# Patient Record
Sex: MALE | Race: WHITE | NOT HISPANIC OR LATINO | Employment: OTHER | URBAN - METROPOLITAN AREA
[De-identification: names, ages, dates, MRNs, and addresses within clinical notes are randomized per-mention and may not be internally consistent; named-entity substitution may affect disease eponyms.]

---

## 2017-01-28 ENCOUNTER — TRANSFERRED RECORDS (OUTPATIENT)
Dept: HEALTH INFORMATION MANAGEMENT | Facility: CLINIC | Age: 77
End: 2017-01-28

## 2017-01-28 LAB — EJECTION FRACTION: 63

## 2017-03-07 ENCOUNTER — TRANSFERRED RECORDS (OUTPATIENT)
Dept: HEALTH INFORMATION MANAGEMENT | Facility: CLINIC | Age: 77
End: 2017-03-07

## 2017-03-16 ENCOUNTER — TRANSFERRED RECORDS (OUTPATIENT)
Dept: HEALTH INFORMATION MANAGEMENT | Facility: CLINIC | Age: 77
End: 2017-03-16

## 2017-04-06 ENCOUNTER — TELEPHONE (OUTPATIENT)
Dept: INTERNAL MEDICINE | Facility: CLINIC | Age: 77
End: 2017-04-06

## 2017-04-06 NOTE — TELEPHONE ENCOUNTER
Reason for Call:  Same Day Appointment, Requested Provider:  Joey Henry MD    PCP: Renan Henry    Reason for visit: Just returned from [place] and the doctor there said to follow up ASAP with primary.  Had appointment on 4/12 but cannot make that appointment any more.     Duration of symptoms: NA    Can we leave a detailed message on this number? YES    Phone number patient can be reached at: Home number on file [phone] (home)    Best Time: Any time    Call taken on 4/6/2017 at 3:55 PM by Abena Toscano

## 2017-04-13 ENCOUNTER — OFFICE VISIT (OUTPATIENT)
Dept: INTERNAL MEDICINE | Facility: CLINIC | Age: 77
End: 2017-04-13
Payer: COMMERCIAL

## 2017-04-13 VITALS
TEMPERATURE: 97.8 F | HEART RATE: 68 BPM | OXYGEN SATURATION: 95 % | RESPIRATION RATE: 18 BRPM | WEIGHT: 205 LBS | SYSTOLIC BLOOD PRESSURE: 118 MMHG | BODY MASS INDEX: 26.31 KG/M2 | DIASTOLIC BLOOD PRESSURE: 64 MMHG | HEIGHT: 74 IN

## 2017-04-13 DIAGNOSIS — R91.8 GROUND GLASS OPACITY PRESENT ON IMAGING OF LUNG: ICD-10-CM

## 2017-04-13 DIAGNOSIS — K76.89 LIVER NODULE: ICD-10-CM

## 2017-04-13 DIAGNOSIS — I26.99 OTHER PULMONARY EMBOLISM WITHOUT ACUTE COR PULMONALE, UNSPECIFIED CHRONICITY (H): Primary | ICD-10-CM

## 2017-04-13 PROCEDURE — 99214 OFFICE O/P EST MOD 30 MIN: CPT | Performed by: INTERNAL MEDICINE

## 2017-04-13 NOTE — MR AVS SNAPSHOT
After Visit Summary   4/13/2017    Oliver Baires    MRN: [id number]           Patient Information     Date Of Birth          1940        Visit Information        Provider Department      4/13/2017 9:45 AM Renan Henry MD [facility]        Today's Diagnoses     Other pulmonary embolism without acute cor pulmonale, unspecified chronicity (H)    -  1    Ground glass opacity present on imaging of lung        Liver nodule           Follow-ups after your visit        Additional Services     PULMONARY MEDICINE REFERRAL       Your provider has referred you to: [facility]: Lung Nodule [facility] [phone]   [email or web address]    Please be aware that coverage of these services is subject to the terms and limitations of your health insurance plan.  Call member services at your health plan with any benefit or coverage questions.      Please bring the following with you to your appointment:    (1) Any X-Rays, CTs or MRIs which have been performed.  Contact the facility where they were done to arrange for  prior to your scheduled appointment.    (2) List of current medications   (3) This referral request   (4) Any documents/labs given to you for this referral                  Future tests that were ordered for you today     Open Future Orders        Priority Expected Expires Ordered    US Abdomen Complete Routine 7/12/2017 4/13/2018 4/13/2017            Who to contact     If you have questions or need follow up information about today's clinic visit or your schedule please contact [facility] directly at [phone].  Normal or non-critical lab and imaging results will be communicated to you by MyChart, letter or phone within 4 business days after the clinic has received the results. If you do not hear from us within 7 days, please contact the clinic through MyChart or phone. If you have a critical or  "abnormal lab result, we will notify you by phone as soon as possible.  Submit refill requests through rumr: turn off the lights or call your pharmacy and they will forward the refill request to us. Please allow 3 business days for your refill to be completed.          Additional Information About Your Visit        MyChart Information     rumr: turn off the lights lets you send messages to your doctor, view your test results, renew your prescriptions, schedule appointments and more. To sign up, go to [email or web address]: turn off the lights . Click on \"Log in\" on the left side of the screen, which will take you to the Welcome page. Then click on \"Sign up Now\" on the right side of the page.     You will be asked to enter the access code listed below, as well as some personal information. Please follow the directions to create your username and password.     Your access code is: [email or web address]  Expires: 2017 10:30 AM     Your access code will  in 90 days. If you need help or a new code, please call your [place] clinic or [phone].        Care EveryWhere ID     This is your Care EveryWhere ID. This could be used by other organizations to access your [place] medical records  [id number]        Your Vitals Were     Pulse Temperature Respirations Height Pulse Oximetry BMI (Body Mass Index)    68 97.8  F (36.6  C) (Oral) 18 6' 2\" (1.88 m) 95% 26.32 kg/m2       Blood Pressure from Last 3 Encounters:   17 118/64   16 100/70   16 120/64    Weight from Last 3 Encounters:   17 205 lb (93 kg)   16 195 lb 4.8 oz (88.6 kg)   16 195 lb (88.5 kg)              We Performed the Following     PULMONARY MEDICINE REFERRAL          Where to get your medicines      These medications were sent to [facility]/pharmacy #[id number] - [place], [place]  [address] [address]     Phone:  [phone]     rivaroxaban ANTICOAGULANT 20 MG Tabs tablet          Primary Care Provider Office Phone # Fax #    Renan Henry, " [phone]       [facility] [address] [facility] [address]        Thank you!     Thank you for choosing [facility]  for your care. Our goal is always to provide you with excellent care. Hearing back from our patients is one way we can continue to improve our services. Please take a few minutes to complete the written survey that you may receive in the mail after your visit with us. Thank you!             Your Updated Medication List - Protect others around you: Learn how to safely use, store and throw away your medicines at www.disposemymeds.org.          This list is accurate as of: 4/13/17 10:31 AM.  Always use your most recent med list.                   Brand Name Dispense Instructions for use    aspirin 81 MG tablet     90 tablet    Take 81 mg by mouth daily Reported on 4/13/2017       atorvastatin 80 MG tablet    LIPITOR    90 tablet    Take 1 tablet (80 mg) by mouth daily       CENTRUM SILVER per tablet      1 TABLET DAILY       cyclobenzaprine 10 MG tablet    FLEXERIL    30 tablet    Take 0.5-1 tablets (5-10 mg) by mouth 3 times daily as needed for muscle spasms       fish oil-omega-3 fatty acids 1000 MG capsule      Reported on 4/13/2017       lisinopril 5 MG tablet    PRINIVIL/ZESTRIL    90 tablet    Take 1 tablet (5 mg) by mouth daily       nitroglycerin 0.4 MG sublingual tablet    NITROSTAT    60 tablet    Place 1 tablet (0.4 mg) under the tongue every 5 minutes as needed up to 3 tablets per episode.       pantoprazole 40 MG EC tablet    PROTONIX    90 tablet    Take 1 tablet (40 mg) by mouth daily       rivaroxaban ANTICOAGULANT 20 MG Tabs tablet    XARELTO    90 tablet    Take 1 tablet (20 mg) by mouth daily (with dinner)       sildenafil 100 MG cap/tab    VIAGRA    15 tablet    Take 1 tablet by mouth. 1/2 tablet as needed       VITAMIN D PO      Take 1 tablet by mouth.       VITAMIN E PO      Take 1 tablet by mouth Reported on 4/13/2017

## 2017-04-13 NOTE — PROGRESS NOTES
SUBJECTIVE:                                                    Oliver Baires is a 77 year old male who presents to clinic today for the following health issues:          Hospital Follow-up Visit:    Hospital/Nursing Home/IP Rehab Facility: [place]   Date of Admission: 1/28/17  Date of Discharge: 1/29/2017  Reason(s) for Admission: pulmonary thrombosis            Problems taking medications regularly:  None       Medication changes since discharge: Began Xarelto       Problems adhering to non-medication therapy:  None    Summary of hospitalization:  See outside records, reviewed and scanned  Diagnostic Tests/Treatments reviewed.  Follow up needed: none  Other Healthcare Providers Involved in Patient s Care:         None  Update since discharge: improved.     Post Discharge Medication Reconciliation: discharge medications reconciled and changed, per note/orders (see AVS).  Plan of care communicated with patient     Coding guidelines for this visit:  Type of Medical   Decision Making Face-to-Face Visit       within 7 Days of discharge Face-to-Face Visit        within 14 days of discharge   Moderate Complexity [id number]   High Complexity [id number]                Problem list and histories reviewed & adjusted, as indicated.  Additional history: as documented    Patient here for follow-up hospitalization in [place] in late January.  Patient was admitted to [facility] in [place], [place] on January 28 of this year, after presenting with migratory back pain and shortness of breath.  He was eventually diagnosed with bilateral pulmonary emboli and right lower extremity DVT, all thought to be due to recent relative immobilization during several-day car trip from [place] to [place].  TTE at time of diagnosis revealed normal left ventricular ejection fraction, but did show RV dilatation.  He was started on Xarelto for anticoagulation, which he continues on at present.      His initial chest CT (1/28/17) had incidentally  "shown an ill-defined groundglass opacity within the left upper lobe peripherally.  Pulmonary infarct was considered, short-term follow-up CT had been recommended at that time.  He did obtain a follow-up CT on 3/16/17 while still in [place], which showed improvement of his pulmonary emboli, but did still show the groundglass opacification in the left upper lobe, 2.89 cm in largest dimension.  Neoplastic process could not completely be ruled out.  Follow-up CT in 3 months versus PET/CT was recommended.    Initial chest CT (1/28/17) also incidentally revealed ill-defined nodular density in the right hepatic lobe.  Abdominal ultrasound had been recommended.  He has had previous hepatic cysts in this region.    Patient is feeling reasonably well, but his exertional capacity is reduced from what he is used to.  As much as anything, he attributes this to relative inactivity during his wintering in [place].  For this reason, and because of his wife's declining health, they do not plan to spend additional wallace there.    Reviewed and updated as needed this visit by clinical staff  Tobacco  Allergies       Reviewed and updated as needed this visit by Provider         ROS:  Constitutional, HEENT, cardiovascular, pulmonary, gi and gu systems are negative, except as otherwise noted.    OBJECTIVE:                                                    /64 (BP Location: Left arm, Patient Position: Chair, Cuff Size: Adult Regular)  Pulse 68  Temp 97.8  F (36.6  C) (Oral)  Resp 18  Ht 6' 2\" (1.88 m)  Wt 205 lb (93 kg)  SpO2 95%  BMI 26.32 kg/m2  Body mass index is 26.32 kg/(m^2).  GENERAL: healthy, alert and no distress  NECK: no adenopathy, no asymmetry, masses, or scars and thyroid normal to palpation  RESP: lungs clear to auscultation - no rales, rhonchi or wheezes  CV: regular rate and rhythm, normal S1 S2, no S3 or S4, no murmur, click or rub, no peripheral edema and peripheral pulses strong  ABDOMEN: soft, nontender, no " hepatosplenomegaly, no masses and bowel sounds normal  MS: no gross musculoskeletal defects noted, no edema         ASSESSMENT/PLAN:                                                      1. Other pulmonary embolism without acute cor pulmonale, unspecified chronicity (H)  Tentative plan will be to continue full anticoagulation for at least 6 months.  DVT appears to be the result of immobility during long car trip preceding his presentation.  As such there is no compelling reason for hypercoagulable workup at this point.  - PULMONARY MEDICINE REFERRAL  - rivaroxaban ANTICOAGULANT (XARELTO) 20 MG TABS tablet; Take 1 tablet (20 mg) by mouth daily (with dinner)  Dispense: 90 tablet; Refill: 1    2. Ground glass opacity present on imaging of lung  Will refer to [facility] for diagnostic assistance, in context of current need for full anticoagulation.  - PULMONARY MEDICINE REFERRAL    3. Liver nodule    - US Abdomen Complete; Future        Renan Henry MD  [facility]

## 2017-04-13 NOTE — NURSING NOTE
"Chief Complaint   Patient presents with     Hospital F/U       Initial /64 (BP Location: Left arm, Patient Position: Chair, Cuff Size: Adult Regular)  Pulse 68  Temp 97.8  F (36.6  C) (Oral)  Resp 18  Ht 6' 2\" (1.88 m)  Wt 205 lb (93 kg)  SpO2 95%  BMI 26.32 kg/m2 Estimated body mass index is 26.32 kg/(m^2) as calculated from the following:    Height as of this encounter: 6' 2\" (1.88 m).    Weight as of this encounter: 205 lb (93 kg).  Medication Reconciliation: complete   Deepika Ferguson MA      "

## 2017-04-19 ENCOUNTER — RADIANT APPOINTMENT (OUTPATIENT)
Dept: ULTRASOUND IMAGING | Facility: CLINIC | Age: 77
End: 2017-04-19
Attending: INTERNAL MEDICINE
Payer: COMMERCIAL

## 2017-04-19 DIAGNOSIS — K76.89 LIVER NODULE: ICD-10-CM

## 2017-04-19 PROCEDURE — 76705 ECHO EXAM OF ABDOMEN: CPT

## 2017-07-08 ENCOUNTER — HEALTH MAINTENANCE LETTER (OUTPATIENT)
Age: 77
End: 2017-07-08

## 2017-07-11 DIAGNOSIS — R91.8 LUNG NODULES: Primary | ICD-10-CM

## 2017-07-19 ENCOUNTER — OFFICE VISIT (OUTPATIENT)
Dept: PULMONOLOGY | Facility: CLINIC | Age: 77
End: 2017-07-19
Attending: INTERNAL MEDICINE
Payer: COMMERCIAL

## 2017-07-19 VITALS
RESPIRATION RATE: 16 BRPM | DIASTOLIC BLOOD PRESSURE: 80 MMHG | TEMPERATURE: 98 F | WEIGHT: 205.91 LBS | BODY MASS INDEX: 26.44 KG/M2 | OXYGEN SATURATION: 97 % | HEART RATE: 61 BPM | SYSTOLIC BLOOD PRESSURE: 148 MMHG

## 2017-07-19 DIAGNOSIS — R91.8 GROUND GLASS OPACITY PRESENT ON IMAGING OF LUNG: ICD-10-CM

## 2017-07-19 DIAGNOSIS — I26.99 OTHER PULMONARY EMBOLISM WITHOUT ACUTE COR PULMONALE, UNSPECIFIED CHRONICITY (H): ICD-10-CM

## 2017-07-19 PROCEDURE — 99212 OFFICE O/P EST SF 10 MIN: CPT | Mod: ZF

## 2017-07-19 ASSESSMENT — ENCOUNTER SYMPTOMS
FEVER: 0
POLYPHAGIA: 0
POLYDIPSIA: 0
FATIGUE: 1
CHILLS: 0
INCREASED ENERGY: 0
ALTERED TEMPERATURE REGULATION: 0
NIGHT SWEATS: 0
HALLUCINATIONS: 0
DECREASED APPETITE: 0
WEIGHT GAIN: 0

## 2017-07-19 ASSESSMENT — PAIN SCALES - GENERAL: PAINLEVEL: MILD PAIN (3)

## 2017-07-19 NOTE — MR AVS SNAPSHOT
"              After Visit Summary   2017    Oliver Baires    MRN: [id number]           Patient Information     Date Of Birth          1940        Visit Information        Provider Department      2017 10:00 AM Wesley Olivares MD [facility]        Today's Diagnoses     Other pulmonary embolism without acute cor pulmonale, unspecified chronicity (H)        Ground glass opacity present on imaging of lung           Follow-ups after your visit        Who to contact     If you have questions or need follow up information about today's clinic visit or your schedule please contact [facility] directly at [phone].  Normal or non-critical lab and imaging results will be communicated to you by MyChart, letter or phone within 4 business days after the clinic has received the results. If you do not hear from us within 7 days, please contact the clinic through MyChart or phone. If you have a critical or abnormal lab result, we will notify you by phone as soon as possible.  Submit refill requests through AngioScore or call your pharmacy and they will forward the refill request to us. Please allow 3 business days for your refill to be completed.          Additional Information About Your Visit        MyChart Information     AngioScore lets you send messages to your doctor, view your test results, renew your prescriptions, schedule appointments and more. To sign up, go to [email or web address] . Click on \"Log in\" on the left side of the screen, which will take you to the Welcome page. Then click on \"Sign up Now\" on the right side of the page.     You will be asked to enter the access code listed below, as well as some personal information. Please follow the directions to create your username and password.     Your access code is: [email or web address]  Expires: 10/22/2017  7:39 AM     Your access code will  in 90 days. If you need help or a new code, please call your [place] " clinic or [phone].        Care EveryWhere ID     This is your Care EveryWhere ID. This could be used by other organizations to access your [place] medical records  [id number]        Your Vitals Were     Pulse Temperature Respirations Pulse Oximetry BMI (Body Mass Index)       61 98  F (36.7  C) (Oral) 16 97% 26.44 kg/m2        Blood Pressure from Last 3 Encounters:   07/19/17 148/80   04/13/17 118/64   08/27/16 100/70    Weight from Last 3 Encounters:   07/19/17 93.4 kg (205 lb 14.6 oz)   04/13/17 93 kg (205 lb)   08/27/16 88.6 kg (195 lb 4.8 oz)              Today, you had the following     No orders found for display         Today's Medication Changes          These changes are accurate as of: 7/19/17 11:59 PM.  If you have any questions, ask your nurse or doctor.               Stop taking these medicines if you haven't already. Please contact your care team if you have questions.     aspirin 81 MG tablet   Stopped by:  Wesley Olivares MD           fish oil-omega-3 fatty acids 1000 MG capsule   Stopped by:  Wesley Olivares MD           sildenafil 100 MG cap/tab   Commonly known as:  VIAGRA   Stopped by:  Wesley Olivares MD           VITAMIN E PO   Stopped by:  Wesley Olivares MD                    Primary Care Provider Office Phone # Fax #    Renan Joey Henry [phone]       [facility] [address] [address]        Equal Access to Services     MASSIMO NAVARRO AH: Hadii cammy shafer hadasho Sonadiya, waaxda luqadaha, qaybta kaalmada choco lynch [facility] Hospitaldinora duarte. So [facility] [phone].    ATENCIÓN: Si habla español, tiene a alva disposición servicios gratuitos de asistencia lingüística. Llame al [phone].    We comply with applicable federal civil rights laws and [place] laws. We do not discriminate on the basis of race, color, national origin, age, disability sex, sexual orientation or gender identity.             Thank you!     Thank you for choosing [facility]  for your care. Our goal is always to provide you with excellent care. Hearing back from our patients is one way we can continue to improve our services. Please take a few minutes to complete the written survey that you may receive in the mail after your visit with us. Thank you!             Your Updated Medication List - Protect others around you: Learn how to safely use, store and throw away your medicines at www.disposemymeds.org.          This list is accurate as of: 7/19/17 11:59 PM.  Always use your most recent med list.                   Brand Name Dispense Instructions for use Diagnosis    atorvastatin 80 MG tablet    LIPITOR    90 tablet    Take 1 tablet (80 mg) by mouth daily    Hyperlipidemia LDL goal <100       CENTRUM SILVER per tablet      1 TABLET DAILY        cyclobenzaprine 10 MG tablet    FLEXERIL    30 tablet    Take 0.5-1 tablets (5-10 mg) by mouth 3 times daily as needed for muscle spasms    Paraspinal muscle spasm, Left-sided thoracic back pain       lisinopril 5 MG tablet    PRINIVIL/ZESTRIL    90 tablet    Take 1 tablet (5 mg) by mouth daily    Essential hypertension       nitroGLYcerin 0.4 MG sublingual tablet    NITROSTAT    60 tablet    Place 1 tablet (0.4 mg) under the tongue every 5 minutes as needed up to 3 tablets per episode.        pantoprazole 40 MG EC tablet    PROTONIX    90 tablet    Take 1 tablet (40 mg) by mouth daily    GERD (gastroesophageal reflux disease)       rivaroxaban ANTICOAGULANT 20 MG Tabs tablet    XARELTO    90 tablet    Take 1 tablet (20 mg) by mouth daily (with dinner)    Other pulmonary embolism without acute cor pulmonale, unspecified chronicity (H)       VITAMIN D PO      Take 1 tablet by mouth.

## 2017-07-19 NOTE — LETTER
7/19/2017       RE: Oliver Baires  [address]  [facility] [address]     Dear Colleague,    Thank you for referring your patient, Oliver Baires, to the [facility]. Please see a copy of my visit note below.    [facility]  Lung Nodule Clinic Note  July 19, 2017    Chief complaint:  Oliver Baires is a 77 year old male seen in the Pulmonary Clinic  for   Chief Complaint   Patient presents with     Oncology Clinic Visit     New for Upper L Lobe , CT results      Assessment and Plan:  Recent diagnosis of DVT and PE that was diagnosed while he was driving from [place] to [place]. He has been on Xarelto. 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 Today's CT scan revealed almost complete resolution. Also showing 2-3 mm pulmonary nodules as well as rounded atelectasis in the right lower lobe. I indicated to him that there is no suspicious lesion in his lungs at this point and his low risk for lung cancer therefore no further CT follow-up is needed. I recommended him to discuss with his primary care provider to have echocardiogram for his dyspnea on exertion.    I spent more than 30 minutes face to face and greater than 50% of time was for counseling and coordination of care about abnormal ct chest.    History of Present Illness:  77 years old gentleman with recent diagnosis of provoked DVT and PE as above. Incidentally Yanni left upper lobe groundglass opacification. His dyspnea on exertion since his pulmonary embolism. He is a former smoker of 20-pack-year and quit in 1980s. He is here today to discuss his abnormal CT findings.    Exposure history: Asbestos;  No , TB;  No , Radiation;   No     Allergies   Allergen Reactions     No Known Drug Allergies         Past Medical History:   Diagnosis Date     Arthritis     Left knee-L knee injury     AV block      Bradycardia     not on  bb due to low HR     CAD (coronary artery disease)     Cath 2010- angioplasty & Xience 3.0x15mm stent to prox LAD; STEMI 10/2004- 3.5 x 33mm Cypher ALEJANDRA to distal RCA, angioplasty to VINCENT, Lcx25%, Lma 25%     ED (erectile dysfunction)      GERD (gastroesophageal reflux disease)      Hyperlipidemia      Hypertension      IBS (irritable bowel syndrome)      Impaired fasting glucose      MI (myocardial infarction) (H)     STEMI 10/2004     Personal history of colonic polyps     Tubular adenomas excised ,      Pneumonia     pleural effusion     Skin cancer         Past Surgical History:   Procedure Laterality Date     HC COLONOSCOPY THRU STOMA, DIAGNOSTIC      Single tubular adenoma excised     HC COLONOSCOPY THRU STOMA, DIAGNOSTIC      Tubular adenoma, hepatic flexure.     HC REMOVAL GALLBLADDER       HEART CATH, ANGIOPLASTY  2010    Angioplasty & Xience 3.0x15mm stent to prox LAD     HEART CATH, ANGIOPLASTY  10/30/2004    3.5x33mm Cypher stent to distal RCA; angioplasty to VINCENT     SURGICAL HISTORY OF -       Angioplasty/stenting of RCA        Social History     Social History     Marital status:      Spouse name: N/A     Number of children: 2     Years of education: N/A     Occupational History      Retired     Social History Main Topics     Smoking status: Former Smoker     Quit date: 1980     Smokeless tobacco: Never Used     Alcohol use 0.0 oz/week     0 Standard drinks or equivalent per week      Comment: 2/day     Drug use: No     Sexual activity: Yes     Other Topics Concern     Caffeine Concern No     3 cups in am     Weight Concern No     Special Diet No     Exercise Yes     1-2 x week     Seat Belt Yes     Social History Narrative        Family History   Problem Relation Age of Onset     CANCER Father      Pancreatic CA,  age 82     Cardiovascular Mother       of ruptured AAA age 92     CANCER Brother      liver ca        Immunization History   Administered  Date(s) Administered     Hepatitis A Vac Ped/Adol-2 Dose 10/27/1997, 06/18/1998     Influenza (H1N1) 01/16/2010     Influenza (High Dose) 3 valent vaccine 10/19/2010, 11/02/2011, 10/13/2012, 09/27/2013, 09/17/2014, 10/29/2015, 10/24/2016     Influenza (IIV3) 10/31/2001, 12/30/2002, 11/19/2003, 11/16/2004, 10/28/2005, 11/02/2006, 11/07/2007, 11/13/2008     Pneumococcal (PCV 13) 10/29/2015     Pneumococcal 23 valent 10/31/2001, 01/19/2009     TD (ADULT, 7+) 10/27/1997, 11/14/2007     Zoster vaccine, live 01/03/2012       Current Outpatient Prescriptions   Medication Sig     rivaroxaban ANTICOAGULANT (XARELTO) 20 MG TABS tablet Take 1 tablet (20 mg) by mouth daily (with dinner)     atorvastatin (LIPITOR) 80 MG tablet Take 1 tablet (80 mg) by mouth daily     Cholecalciferol (VITAMIN D PO) Take 1 tablet by mouth.     CENTRUM SILVER OR TABS 1 TABLET DAILY     pantoprazole (PROTONIX) 40 MG enteric coated tablet Take 1 tablet (40 mg) by mouth daily (Patient not taking: Reported on 7/19/2017)     lisinopril (PRINIVIL,ZESTRIL) 5 MG tablet Take 1 tablet (5 mg) by mouth daily (Patient not taking: Reported on 4/13/2017)     cyclobenzaprine (FLEXERIL) 10 MG tablet Take 0.5-1 tablets (5-10 mg) by mouth 3 times daily as needed for muscle spasms (Patient not taking: Reported on 4/13/2017)     nitroglycerin (NITROSTAT) 0.4 MG SL tablet Place 1 tablet (0.4 mg) under the tongue every 5 minutes as needed up to 3 tablets per episode. (Patient not taking: Reported on 7/19/2017)     No current facility-administered medications for this visit.         Review of Systems:  See below    Physical examination  Constitutional: Alert, oriented, not in distress  Vitals: B/P: 148/80, T: 98, P: 61, R: 16  Eyes: No icterus, nystagmus, pupils isocoric  Musculoskeletal: Normal muscle mass, no dephormity  Integumentary:  No rash, normal texture and turgor, no edema  Neurological: Alert, orientedx3, no motor deficits, cranial nerves grossly  normal  Psychiatric:  Mood and affect are appropriate with insight into his/her medical condition    Data:  Lab Results   Component Value Date    WBC 9.1 07/30/2016     Lab Results   Component Value Date    RBC 4.21 07/30/2016     Lab Results   Component Value Date    HGB 13.8 07/30/2016     Lab Results   Component Value Date    HCT 40.3 07/30/2016     Lab Results   Component Value Date    MCV 96 07/30/2016     Lab Results   Component Value Date    MCH 32.8 07/30/2016     Lab Results   Component Value Date    MCHC 34.2 07/30/2016     Lab Results   Component Value Date    RDW 12.9 07/30/2016     Lab Results   Component Value Date     07/30/2016       Lab Results   Component Value Date     07/30/2016      Lab Results   Component Value Date    POTASSIUM 4.1 07/30/2016     Lab Results   Component Value Date    CHLORIDE 106 07/30/2016     Lab Results   Component Value Date    DALIA 8.8 07/30/2016     Lab Results   Component Value Date    CO2 25 07/30/2016     Lab Results   Component Value Date    BUN 13 07/30/2016     Lab Results   Component Value Date    CR 0.84 07/30/2016     Lab Results   Component Value Date     07/30/2016       Thank you for allowing me participate in the care of Oliver AUREA Baires.    ALAN Olivares MD

## 2017-07-19 NOTE — NURSING NOTE
"Oncology Rooming Note    July 19, 2017 9:34 AM   Oliver Baires is a 77 year old male who presents for:    Chief Complaint   Patient presents with     Oncology Clinic Visit     New for Upper L Lobe , CT results      Initial Vitals: /80 (BP Location: Left arm, Patient Position: Chair, Cuff Size: Adult Large)  Pulse 61  Temp 98  F (36.7  C) (Oral)  Resp 16  Wt 93.4 kg (205 lb 14.6 oz)  SpO2 97%  BMI 26.44 kg/m2 Estimated body mass index is 26.44 kg/(m^2) as calculated from the following:    Height as of 4/13/17: 1.88 m (6' 2\").    Weight as of this encounter: 93.4 kg (205 lb 14.6 oz). Body surface area is 2.21 meters squared.  Mild Pain (3) Comment: Data Unavailable   No LMP for male patient.  Allergies reviewed: Yes  Medications reviewed: Yes    Medications: Medication refills not needed today.  Pharmacy name entered into [employer]:    [id number] - [facility] [address]  [id number] - [place], [place] - [address] [address] AT [facility]    Clinical concerns: scan results  dincer  was notified.    7 minutes for nursing intake (face to face time)     Nazanin Barraza MA              "

## 2017-07-19 NOTE — PROGRESS NOTES
[facility]  Lung Nodule Clinic Note  July 19, 2017    Chief complaint:  Oliver Baires is a 77 year old male seen in the Pulmonary Clinic  for   Chief Complaint   Patient presents with     Oncology Clinic Visit     New for Upper L Lobe , CT results      Assessment and Plan:  Recent diagnosis of DVT and PE that was diagnosed while he was driving from [place] to [place]. He has been on Xarelto. 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 Today's CT scan revealed almost complete resolution. Also showing 2-3 mm pulmonary nodules as well as rounded atelectasis in the right lower lobe. I indicated to him that there is no suspicious lesion in his lungs at this point and his low risk for lung cancer therefore no further CT follow-up is needed. I recommended him to discuss with his primary care provider to have echocardiogram for his dyspnea on exertion.    I spent more than 30 minutes face to face and greater than 50% of time was for counseling and coordination of care about abnormal ct chest.    History of Present Illness:  77 years old gentleman with recent diagnosis of provoked DVT and PE as above. Incidentally Yanni left upper lobe groundglass opacification. His dyspnea on exertion since his pulmonary embolism. He is a former smoker of 20-pack-year and quit in 1980s. He is here today to discuss his abnormal CT findings.    Exposure history: Asbestos;  No , TB;  No , Radiation;   No     Allergies   Allergen Reactions     No Known Drug Allergies         Past Medical History:   Diagnosis Date     Arthritis     Left knee-L knee injury     AV block      Bradycardia     not on bb due to low HR     CAD (coronary artery disease)     Cath 6/2010- angioplasty & Xience 3.0x15mm stent to prox LAD; STEMI 10/2004- 3.5 x 33mm Cypher ALEJANDRA to distal RCA, angioplasty to VINCENT, Lcx25%, Lma 25%     ED (erectile dysfunction)      GERD (gastroesophageal reflux disease)       Hyperlipidemia      Hypertension      IBS (irritable bowel syndrome)      Impaired fasting glucose      MI (myocardial infarction) (H)     STEMI 10/2004     Personal history of colonic polyps     Tubular adenomas excised ,      Pneumonia 2004    pleural effusion     Skin cancer         Past Surgical History:   Procedure Laterality Date     HC COLONOSCOPY THRU STOMA, DIAGNOSTIC      Single tubular adenoma excised     HC COLONOSCOPY THRU STOMA, DIAGNOSTIC      Tubular adenoma, hepatic flexure.     HC REMOVAL GALLBLADDER       HEART CATH, ANGIOPLASTY  2010    Angioplasty & Xience 3.0x15mm stent to prox LAD     HEART CATH, ANGIOPLASTY  10/30/2004    3.5x33mm Cypher stent to distal RCA; angioplasty to VINCENT     SURGICAL HISTORY OF -       Angioplasty/stenting of RCA        Social History     Social History     Marital status:      Spouse name: N/A     Number of children: 2     Years of education: N/A     Occupational History      Retired     Social History Main Topics     Smoking status: Former Smoker     Quit date: 1980     Smokeless tobacco: Never Used     Alcohol use 0.0 oz/week     0 Standard drinks or equivalent per week      Comment: 2/day     Drug use: No     Sexual activity: Yes     Other Topics Concern     Caffeine Concern No     3 cups in am     Weight Concern No     Special Diet No     Exercise Yes     1-2 x week     Seat Belt Yes     Social History Narrative        Family History   Problem Relation Age of Onset     CANCER Father      Pancreatic CA,  age 82     Cardiovascular Mother       of ruptured AAA age 92     CANCER Brother      liver ca        Immunization History   Administered Date(s) Administered     Hepatitis A Vac Ped/Adol-2 Dose 10/27/1997, 1998     Influenza (H1N1) 2010     Influenza (High Dose) 3 valent vaccine 10/19/2010, 2011, 10/13/2012, 2013, 2014, 10/29/2015, 10/24/2016     Influenza (IIV3) 10/31/2001, 2002,  11/19/2003, 11/16/2004, 10/28/2005, 11/02/2006, 11/07/2007, 11/13/2008     Pneumococcal (PCV 13) 10/29/2015     Pneumococcal 23 valent 10/31/2001, 01/19/2009     TD (ADULT, 7+) 10/27/1997, 11/14/2007     Zoster vaccine, live 01/03/2012       Current Outpatient Prescriptions   Medication Sig     rivaroxaban ANTICOAGULANT (XARELTO) 20 MG TABS tablet Take 1 tablet (20 mg) by mouth daily (with dinner)     atorvastatin (LIPITOR) 80 MG tablet Take 1 tablet (80 mg) by mouth daily     Cholecalciferol (VITAMIN D PO) Take 1 tablet by mouth.     CENTRUM SILVER OR TABS 1 TABLET DAILY     pantoprazole (PROTONIX) 40 MG enteric coated tablet Take 1 tablet (40 mg) by mouth daily (Patient not taking: Reported on 7/19/2017)     lisinopril (PRINIVIL,ZESTRIL) 5 MG tablet Take 1 tablet (5 mg) by mouth daily (Patient not taking: Reported on 4/13/2017)     cyclobenzaprine (FLEXERIL) 10 MG tablet Take 0.5-1 tablets (5-10 mg) by mouth 3 times daily as needed for muscle spasms (Patient not taking: Reported on 4/13/2017)     nitroglycerin (NITROSTAT) 0.4 MG SL tablet Place 1 tablet (0.4 mg) under the tongue every 5 minutes as needed up to 3 tablets per episode. (Patient not taking: Reported on 7/19/2017)     No current facility-administered medications for this visit.         Review of Systems:  See below    Physical examination  Constitutional: Alert, oriented, not in distress  Vitals: B/P: 148/80, T: 98, P: 61, R: 16  Eyes: No icterus, nystagmus, pupils isocoric  Musculoskeletal: Normal muscle mass, no dephormity  Integumentary:  No rash, normal texture and turgor, no edema  Neurological: Alert, orientedx3, no motor deficits, cranial nerves grossly normal  Psychiatric:  Mood and affect are appropriate with insight into his/her medical condition    Data:  Lab Results   Component Value Date    WBC 9.1 07/30/2016     Lab Results   Component Value Date    RBC 4.21 07/30/2016     Lab Results   Component Value Date    HGB 13.8 07/30/2016     Lab  Results   Component Value Date    HCT 40.3 07/30/2016     Lab Results   Component Value Date    MCV 96 07/30/2016     Lab Results   Component Value Date    MCH 32.8 07/30/2016     Lab Results   Component Value Date    MCHC 34.2 07/30/2016     Lab Results   Component Value Date    RDW 12.9 07/30/2016     Lab Results   Component Value Date     07/30/2016       Lab Results   Component Value Date     07/30/2016      Lab Results   Component Value Date    POTASSIUM 4.1 07/30/2016     Lab Results   Component Value Date    CHLORIDE 106 07/30/2016     Lab Results   Component Value Date    DALIA 8.8 07/30/2016     Lab Results   Component Value Date    CO2 25 07/30/2016     Lab Results   Component Value Date    BUN 13 07/30/2016     Lab Results   Component Value Date    CR 0.84 07/30/2016     Lab Results   Component Value Date     07/30/2016       Thank you for allowing me participate in the care of Oliver Baires.    ALAN Olivares MD      Answers for HPI/ROS submitted by the patient on 7/19/2017   General Symptoms: Yes  Skin Symptoms: No  HENT Symptoms: No  EYE SYMPTOMS: No  HEART SYMPTOMS: No  LUNG SYMPTOMS: No  INTESTINAL SYMPTOMS: No  URINARY SYMPTOMS: No  REPRODUCTIVE SYMPTOMS: No  SKELETAL SYMPTOMS: No  BLOOD SYMPTOMS: No  NERVOUS SYSTEM SYMPTOMS: No  MENTAL HEALTH SYMPTOMS: No  Fever: No  Loss of appetite: No  Weight gain: No  Fatigue: Yes  Night sweats: No  Chills: No  Increased stress: No  Excessive hunger: No  Excessive thirst: No  Feeling hot or cold when others believe the temperature is normal: No  Loss of height: No  Post-operative complications: No  Surgical site pain: No  Hallucinations: No  Change in or Loss of Energy: No  Hyperactivity: No  Confusion: No

## 2017-08-19 DIAGNOSIS — K21.9 GERD (GASTROESOPHAGEAL REFLUX DISEASE): ICD-10-CM

## 2017-08-22 RX ORDER — PANTOPRAZOLE SODIUM 40 MG/1
TABLET, DELAYED RELEASE ORAL
Qty: 90 TABLET | Refills: 2 | Status: SHIPPED | OUTPATIENT
Start: 2017-08-22 | End: 2017-10-24

## 2017-10-22 DIAGNOSIS — E78.5 HYPERLIPIDEMIA LDL GOAL <100: ICD-10-CM

## 2017-10-24 ENCOUNTER — APPOINTMENT (OUTPATIENT)
Dept: CT IMAGING | Facility: CLINIC | Age: 77
End: 2017-10-24
Attending: EMERGENCY MEDICINE
Payer: COMMERCIAL

## 2017-10-24 ENCOUNTER — HOSPITAL ENCOUNTER (OUTPATIENT)
Facility: CLINIC | Age: 77
Setting detail: OBSERVATION
Discharge: HOME | End: 2017-10-25
Attending: EMERGENCY MEDICINE | Admitting: INTERNAL MEDICINE
Payer: COMMERCIAL

## 2017-10-24 DIAGNOSIS — I26.99 OTHER ACUTE PULMONARY EMBOLISM WITHOUT ACUTE COR PULMONALE (H): ICD-10-CM

## 2017-10-24 LAB
ALBUMIN SERPL-MCNC: 3.6 G/DL (ref 3.4–5)
ALP SERPL-CCNC: 79 U/L (ref 40–150)
ALT SERPL W P-5'-P-CCNC: 57 U/L (ref 0–70)
ANION GAP SERPL CALCULATED.3IONS-SCNC: 9 MMOL/L (ref 3–14)
AST SERPL W P-5'-P-CCNC: 47 U/L (ref 0–45)
BASOPHILS # BLD AUTO: 0 10E9/L (ref 0–0.2)
BASOPHILS NFR BLD AUTO: 0.2 %
BILIRUB SERPL-MCNC: 0.4 MG/DL (ref 0.2–1.3)
BUN SERPL-MCNC: 16 MG/DL (ref 7–30)
CALCIUM SERPL-MCNC: 9.2 MG/DL (ref 8.5–10.1)
CHLORIDE SERPL-SCNC: 105 MMOL/L (ref 94–109)
CO2 SERPL-SCNC: 26 MMOL/L (ref 20–32)
CREAT SERPL-MCNC: 0.79 MG/DL (ref 0.66–1.25)
D DIMER PPP FEU-MCNC: 0.9 UG/ML FEU (ref 0–0.5)
DIFFERENTIAL METHOD BLD: ABNORMAL
EOSINOPHIL # BLD AUTO: 0.2 10E9/L (ref 0–0.7)
EOSINOPHIL NFR BLD AUTO: 4.9 %
ERYTHROCYTE [DISTWIDTH] IN BLOOD BY AUTOMATED COUNT: 13 % (ref 10–15)
GFR SERPL CREATININE-BSD FRML MDRD: >90 ML/MIN/1.7M2
GLUCOSE SERPL-MCNC: 98 MG/DL (ref 70–99)
HCT VFR BLD AUTO: 40.6 % (ref 40–53)
HGB BLD-MCNC: 14.1 G/DL (ref 13.3–17.7)
IMM GRANULOCYTES # BLD: 0 10E9/L (ref 0–0.4)
IMM GRANULOCYTES NFR BLD: 0.2 %
INR PPP: 0.96 (ref 0.86–1.14)
INTERPRETATION ECG - MUSE: NORMAL
LYMPHOCYTES # BLD AUTO: 1.2 10E9/L (ref 0.8–5.3)
LYMPHOCYTES NFR BLD AUTO: 24.4 %
MCH RBC QN AUTO: 32.7 PG (ref 26.5–33)
MCHC RBC AUTO-ENTMCNC: 34.7 G/DL (ref 31.5–36.5)
MCV RBC AUTO: 94 FL (ref 78–100)
MONOCYTES # BLD AUTO: 0.8 10E9/L (ref 0–1.3)
MONOCYTES NFR BLD AUTO: 15.8 %
NEUTROPHILS # BLD AUTO: 2.7 10E9/L (ref 1.6–8.3)
NEUTROPHILS NFR BLD AUTO: 54.5 %
NRBC # BLD AUTO: 0 10*3/UL
NRBC BLD AUTO-RTO: 0 /100
PLATELET # BLD AUTO: 196 10E9/L (ref 150–450)
POTASSIUM SERPL-SCNC: 3.7 MMOL/L (ref 3.4–5.3)
PROT SERPL-MCNC: 7.8 G/DL (ref 6.8–8.8)
RADIOLOGIST FLAGS: ABNORMAL
RBC # BLD AUTO: 4.31 10E12/L (ref 4.4–5.9)
SODIUM SERPL-SCNC: 140 MMOL/L (ref 133–144)
TROPONIN I SERPL-MCNC: <0.015 UG/L (ref 0–0.04)
WBC # BLD AUTO: 4.9 10E9/L (ref 4–11)

## 2017-10-24 PROCEDURE — 93005 ELECTROCARDIOGRAM TRACING: CPT

## 2017-10-24 PROCEDURE — 85610 PROTHROMBIN TIME: CPT | Performed by: EMERGENCY MEDICINE

## 2017-10-24 PROCEDURE — 71260 CT THORAX DX C+: CPT

## 2017-10-24 PROCEDURE — 99220 ZZC INITIAL OBSERVATION CARE,LEVL III: CPT | Performed by: INTERNAL MEDICINE

## 2017-10-24 PROCEDURE — 25000132 ZZH RX MED GY IP 250 OP 250 PS 637: Performed by: EMERGENCY MEDICINE

## 2017-10-24 PROCEDURE — 99285 EMERGENCY DEPT VISIT HI MDM: CPT | Mod: 25

## 2017-10-24 PROCEDURE — 84484 ASSAY OF TROPONIN QUANT: CPT | Performed by: EMERGENCY MEDICINE

## 2017-10-24 PROCEDURE — 25000125 ZZHC RX 250: Performed by: EMERGENCY MEDICINE

## 2017-10-24 PROCEDURE — 25000128 H RX IP 250 OP 636: Performed by: EMERGENCY MEDICINE

## 2017-10-24 PROCEDURE — 96360 HYDRATION IV INFUSION INIT: CPT | Mod: 59

## 2017-10-24 PROCEDURE — 80053 COMPREHEN METABOLIC PANEL: CPT | Performed by: EMERGENCY MEDICINE

## 2017-10-24 PROCEDURE — 85379 FIBRIN DEGRADATION QUANT: CPT | Performed by: EMERGENCY MEDICINE

## 2017-10-24 PROCEDURE — G0378 HOSPITAL OBSERVATION PER HR: HCPCS

## 2017-10-24 PROCEDURE — 85025 COMPLETE CBC W/AUTO DIFF WBC: CPT | Performed by: EMERGENCY MEDICINE

## 2017-10-24 RX ORDER — SODIUM CHLORIDE 9 MG/ML
1000 INJECTION, SOLUTION INTRAVENOUS CONTINUOUS
Status: DISCONTINUED | OUTPATIENT
Start: 2017-10-24 | End: 2017-10-25

## 2017-10-24 RX ORDER — ATORVASTATIN CALCIUM 80 MG/1
TABLET, FILM COATED ORAL
Qty: 30 TABLET | Refills: 0 | Status: SHIPPED | OUTPATIENT
Start: 2017-10-24 | End: 2017-11-21

## 2017-10-24 RX ORDER — IOPAMIDOL 755 MG/ML
74 INJECTION, SOLUTION INTRAVASCULAR ONCE
Status: COMPLETED | OUTPATIENT
Start: 2017-10-24 | End: 2017-10-24

## 2017-10-24 RX ADMIN — IOPAMIDOL 74 ML: 755 INJECTION, SOLUTION INTRAVENOUS at 21:40

## 2017-10-24 RX ADMIN — RIVAROXABAN 10 MG: 10 TABLET, FILM COATED ORAL at 22:31

## 2017-10-24 RX ADMIN — SODIUM CHLORIDE 97 ML: 9 INJECTION, SOLUTION INTRAVENOUS at 21:41

## 2017-10-24 RX ADMIN — SODIUM CHLORIDE 1000 ML: 9 INJECTION, SOLUTION INTRAVENOUS at 20:22

## 2017-10-24 RX ADMIN — SODIUM CHLORIDE 1000 ML: 9 INJECTION, SOLUTION INTRAVENOUS at 23:38

## 2017-10-24 ASSESSMENT — ENCOUNTER SYMPTOMS
HEMATURIA: 0
BACK PAIN: 1
DYSURIA: 0
FREQUENCY: 0
SHORTNESS OF BREATH: 1
PALPITATIONS: 1

## 2017-10-24 NOTE — IP AVS SNAPSHOT
MRN:[id number]                      After Visit Summary   10/24/2017    Oliver Baires    MRN: [id number]           Thank you!     Thank you for choosing [facility] for your care. Our goal is always to provide you with excellent care. Hearing back from our patients is one way we can continue to improve our services. Please take a few minutes to complete the written survey that you may receive in the mail after you visit with us. Thank you!        Patient Information     Date Of Birth          1940        About your hospital stay     You were admitted on:  October 24, 2017 You last received care in the:  [facility]    You were discharged on:  October 25, 2017        Reason for your hospital stay       You were admitted due to findings of a pulmonary embolism to monitor your heart rate and rhythm overnight. You were stable and started on Xarelto, which will need to be continued lifelong. You are to check in with your primary care provider regarding this and any additional workup needed.                  Who to Call     For medical emergencies, please call 911.  For non-urgent questions about your medical care, please call your primary care provider or clinic, [phone]          Attending Provider     Provider Specialty    Dagoberto Veronica MD Emergency Medicine    Fink, Solomon Marin MD Internal Medicine       Primary Care Provider Office Phone # Fax #    Renan Henry [phone]      After Care Instructions     Activity       Your activity upon discharge: activity as tolerated            Diet       Follow this diet upon discharge: Orders Placed This Encounter      Regular Diet Adult                  Follow-up Appointments     Follow-up and recommended labs and tests        Follow up with primary care provider, Renan Henry, within 4-6 weeks for hospital follow- up, regarding new diagnosis and to follow up on results.                  Your next 10  appointments already scheduled     Nov 02, 2017 11:00 AM CDT   Office Visit with Renan Henry MD   [facility] ([facility]    [address] [address] [phone]           Bring a current list of meds and any records pertaining to this visit. For Physicals, please bring immunization records and any forms needing to be filled out. Please arrive 10 minutes early to complete paperwork.            Nov 30, 2017  8:00 AM CST   NM SH CV MPI MULT RST ST 1 DAY with SCINM1   [facility] CV Nuclear Medicine (Cardiovascular Imaging at [facility])    [address]  [address]  [facility] [address] [phone]           For a ONE day exam: Allow 3-4 hours for test. For a TWO day exam: Allow 2 hours PER day for test.  You may need to stop some medicines before the test. Follow your doctor s orders. - If you take a beta blocker: Follow your doctor s specific instructions on taking it prior to and on the day of your exam. - If you take Aggrenox or dipyridamole (Persantine, Permole), stop taking it 48 hours before your test. - If you take Viagra, Cialis or Levitra, stop taking it 48 hours before your test. - If you take theophylline or aminophylline, stop taking it 12 hours before your test.  For patients with diabetes: - If you take insulin, call your diabetes care team. Ask if you should take a 1/2 dose the morning of your test. - If you take diabetes medicine by mouth, don t take it on the morning of your test. Bring it with you to take after the test. (If you have questions, call your diabetes care team.)  Do not take nitrates on the day of your test. Do not wear your Nitro-Patch.  Stop all caffeine 12 hours before the test. This includes coffee, tea, soda pop, chocolate and certain medicines (such as Anacin, Excedrin and NoDoz). Also avoid decaf coffee and tea, as these contain small amounts of caffeine.  No  "alcohol, smoking or other tobacco for 12 hours before the test.  Stop eating 3 hours before the test. You may drink water.  Please wear a loose two-piece outfit. If you will have an exercise test, bring rubber-soled walking shoes.  When you arrive, please tell us if you: - Have diabetes - Are breastfeeding - May be pregnant - Have a pacemaker of ICD (implantable defibrillator).  Please call your Imaging Department at your exam site with any questions.            Dec 26, 2017  8:45 AM CST   Return Visit with Tommy Villarreal MD   [facility] AT [place] ([facility])    [facility] [id number]  [facility] [address] [phone]              Pending Results     No orders found for last 3 day(s).            Statement of Approval     Ordered          10/25/17 1054  I have reviewed and agree with all the recommendations and orders detailed in this document.  EFFECTIVE NOW     Approved and electronically signed by:  Naren Florence PA-C             Admission Information     Date & Time Provider Department Dept. Phone    10/24/2017 Fink, Solomon Marin MD [facility] Observation [facility] [phone]      Your Vitals Were     Blood Pressure Pulse Temperature Respirations Height Weight    112/59 (BP Location: Left arm) 85 96.8  F (36  C) (Oral) 16 1.88 m (6' 2\") 92.5 kg (204 lb)    Pulse Oximetry BMI (Body Mass Index)                96% 26.19 kg/m2          MyChart Information     [employer] lets you send messages to your doctor, view your test results, renew your prescriptions, schedule appointments and more. To sign up, go to [email or web address] . Click on \"Log in\" on the left side of the screen, which will take you to the Welcome page. Then click on \"Sign up Now\" on the right side of the page.     You will be asked to enter the access code listed below, as well as some personal information. Please follow the directions to create your username and password.     Your access code is: " [email or web address]  Expires: 2018 11:50 AM     Your access code will  in 90 days. If you need help or a new code, please call your [facility] clinic or [phone].        Care EveryWhere ID     This is your Care EveryWhere ID. This could be used by other organizations to access your [facility] medical records  [id number]        Equal Access to Services     [facility] HospitalDAVIN : Hadii aad ku hadasho Soomaali, waaxda luqadaha, qaybta kaalmada adeegyada, waxay idiin hayaan adeadebayo carroll laJajaalek . So [facility] [phone].    ATENCIÓN: Si habla español, tiene a alva disposición servicios gratuitos de asistencia lingüística. Llame al [phone].    We comply with applicable federal civil rights laws and [place] laws. We do not discriminate on the basis of race, color, national origin, age, disability, sex, sexual orientation, or gender identity.               Review of your medicines      START taking        Dose / Directions    * rivaroxaban ANTICOAGULANT 15 MG Tabs tablet   Commonly known as:  XARELTO   Used for:  Other acute pulmonary embolism without acute cor pulmonale (H)        Dose:  15 mg   Take 1 tablet (15 mg) by mouth 2 times daily (with meals) for 20 days   Quantity:  40 tablet   Refills:  0       * rivaroxaban ANTICOAGULANT 20 MG Tabs tablet   Commonly known as:  XARELTO   Used for:  Other acute pulmonary embolism without acute cor pulmonale (H)        Dose:  20 mg   Start taking on:  11/15/2017   Take 1 tablet (20 mg) by mouth daily (with dinner)   Quantity:  30 tablet   Refills:  1       * Notice:  This list has 2 medication(s) that are the same as other medications prescribed for you. Read the directions carefully, and ask your doctor or other care provider to review them with you.      CONTINUE these medicines which have NOT CHANGED        Dose / Directions    atorvastatin 80 MG tablet   Commonly known as:  LIPITOR   Used for:  Hyperlipidemia LDL goal <100        TAKE 1 TABLET (80 MG) BY MOUTH DAILY    Quantity:  30 tablet   Refills:  0       lisinopril 5 MG tablet   Commonly known as:  PRINIVIL/ZESTRIL   Used for:  Essential hypertension        Dose:  5 mg   Take 1 tablet (5 mg) by mouth daily   Quantity:  90 tablet   Refills:  3       MULTIVITAMIN ADULTS PO        Dose:  1 tablet   Take 1 tablet by mouth daily   Refills:  0       nitroGLYcerin 0.4 MG sublingual tablet   Commonly known as:  NITROSTAT        Dose:  0.4 mg   Place 1 tablet (0.4 mg) under the tongue every 5 minutes as needed up to 3 tablets per episode.   Quantity:  60 tablet   Refills:  1       PROTONIX PO        Dose:  40 mg   Take 40 mg by mouth daily   Refills:  0       VITAMIN D PO        Dose:  1 tablet   Take 1 tablet by mouth every other day (OTC: Pt unsure of strength)   Refills:  0         STOP taking     ASPIRIN EC PO                Where to get your medicines      These medications were sent to [facility]/pharmacy #[id number] - [facility] [address] [address]     Phone:  [phone]     rivaroxaban ANTICOAGULANT 15 MG Tabs tablet    rivaroxaban ANTICOAGULANT 20 MG Tabs tablet                Protect others around you: Learn how to safely use, store and throw away your medicines at www.disposemymeds.org.             Medication List: This is a list of all your medications and when to take them. Check marks below indicate your daily home schedule. Keep this list as a reference.      Medications           Morning Afternoon Evening Bedtime As Needed    atorvastatin 80 MG tablet   Commonly known as:  LIPITOR   TAKE 1 TABLET (80 MG) BY MOUTH DAILY                                lisinopril 5 MG tablet   Commonly known as:  PRINIVIL/ZESTRIL   Take 1 tablet (5 mg) by mouth daily                                MULTIVITAMIN ADULTS PO   Take 1 tablet by mouth daily                                nitroGLYcerin 0.4 MG sublingual tablet   Commonly known as:  NITROSTAT   Place 1 tablet (0.4 mg) under the tongue every  5 minutes as needed up to 3 tablets per episode.                                PROTONIX PO   Take 40 mg by mouth daily                                * rivaroxaban ANTICOAGULANT 15 MG Tabs tablet   Commonly known as:  XARELTO   Take 1 tablet (15 mg) by mouth 2 times daily (with meals) for 20 days   Last time this was given:  15 mg on 10/25/2017  9:52 AM                                * rivaroxaban ANTICOAGULANT 20 MG Tabs tablet   Commonly known as:  XARELTO   Take 1 tablet (20 mg) by mouth daily (with dinner)   Start taking on:  11/15/2017   Last time this was given:  15 mg on 10/25/2017  9:52 AM                                VITAMIN D PO   Take 1 tablet by mouth every other day (OTC: Pt unsure of strength)                                * Notice:  This list has 2 medication(s) that are the same as other medications prescribed for you. Read the directions carefully, and ask your doctor or other care provider to review them with you.

## 2017-10-24 NOTE — IP AVS SNAPSHOT
[facility]    [address] [place] [address]    Phone:  [phone]                                       After Visit Summary   10/24/2017    Oliver Baires    MRN: [id number]           After Visit Summary Signature Page     I have received my discharge instructions, and my questions have been answered. I have discussed any challenges I see with this plan with the nurse or doctor.    ..........................................................................................................................................  Patient/Patient Representative Signature      ..........................................................................................................................................  Patient Representative Print Name and Relationship to Patient    ..................................................               ................................................  Date                                            Time    ..........................................................................................................................................  Reviewed by Signature/Title    ...................................................              ..............................................  Date                                                            Time

## 2017-10-25 VITALS
RESPIRATION RATE: 16 BRPM | DIASTOLIC BLOOD PRESSURE: 59 MMHG | HEIGHT: 74 IN | WEIGHT: 204 LBS | TEMPERATURE: 96.8 F | BODY MASS INDEX: 26.18 KG/M2 | HEART RATE: 85 BPM | SYSTOLIC BLOOD PRESSURE: 112 MMHG | OXYGEN SATURATION: 96 %

## 2017-10-25 PROBLEM — I26.99 PULMONARY EMBOLISM (H): Status: ACTIVE | Noted: 2017-10-25

## 2017-10-25 LAB
CREAT SERPL-MCNC: 0.8 MG/DL (ref 0.66–1.25)
GFR SERPL CREATININE-BSD FRML MDRD: >90 ML/MIN/1.7M2

## 2017-10-25 PROCEDURE — 96361 HYDRATE IV INFUSION ADD-ON: CPT

## 2017-10-25 PROCEDURE — 82565 ASSAY OF CREATININE: CPT | Performed by: INTERNAL MEDICINE

## 2017-10-25 PROCEDURE — 36415 COLL VENOUS BLD VENIPUNCTURE: CPT | Performed by: INTERNAL MEDICINE

## 2017-10-25 PROCEDURE — G0378 HOSPITAL OBSERVATION PER HR: HCPCS

## 2017-10-25 PROCEDURE — 99217 ZZC OBSERVATION CARE DISCHARGE: CPT | Performed by: PHYSICIAN ASSISTANT

## 2017-10-25 PROCEDURE — 25000132 ZZH RX MED GY IP 250 OP 250 PS 637: Performed by: INTERNAL MEDICINE

## 2017-10-25 RX ORDER — PROCHLORPERAZINE MALEATE 5 MG
5 TABLET ORAL EVERY 6 HOURS PRN
Status: DISCONTINUED | OUTPATIENT
Start: 2017-10-25 | End: 2017-10-25 | Stop reason: HOSPADM

## 2017-10-25 RX ORDER — NALOXONE HYDROCHLORIDE 0.4 MG/ML
.1-.4 INJECTION, SOLUTION INTRAMUSCULAR; INTRAVENOUS; SUBCUTANEOUS
Status: DISCONTINUED | OUTPATIENT
Start: 2017-10-25 | End: 2017-10-25 | Stop reason: HOSPADM

## 2017-10-25 RX ORDER — ONDANSETRON 2 MG/ML
4 INJECTION INTRAMUSCULAR; INTRAVENOUS EVERY 6 HOURS PRN
Status: DISCONTINUED | OUTPATIENT
Start: 2017-10-25 | End: 2017-10-25 | Stop reason: HOSPADM

## 2017-10-25 RX ORDER — ACETAMINOPHEN 325 MG/1
650 TABLET ORAL EVERY 4 HOURS PRN
Status: DISCONTINUED | OUTPATIENT
Start: 2017-10-25 | End: 2017-10-25 | Stop reason: HOSPADM

## 2017-10-25 RX ORDER — LIDOCAINE 40 MG/G
CREAM TOPICAL
Status: DISCONTINUED | OUTPATIENT
Start: 2017-10-25 | End: 2017-10-25 | Stop reason: HOSPADM

## 2017-10-25 RX ORDER — ONDANSETRON 4 MG/1
4 TABLET, ORALLY DISINTEGRATING ORAL EVERY 6 HOURS PRN
Status: DISCONTINUED | OUTPATIENT
Start: 2017-10-25 | End: 2017-10-25 | Stop reason: HOSPADM

## 2017-10-25 RX ORDER — PROCHLORPERAZINE 25 MG
12.5 SUPPOSITORY, RECTAL RECTAL EVERY 12 HOURS PRN
Status: DISCONTINUED | OUTPATIENT
Start: 2017-10-25 | End: 2017-10-25 | Stop reason: HOSPADM

## 2017-10-25 RX ADMIN — RIVAROXABAN 15 MG: 15 TABLET, FILM COATED ORAL at 09:52

## 2017-10-25 NOTE — ED NOTES
"[facility]  ED Nurse Handoff Report    ED Chief complaint: Chest Pain (pt having chest pain for 3-4 days. exertion does not make pain worse. Pain increases with deep breaths)      ED Diagnosis:   Final diagnoses:   Other acute pulmonary embolism without acute cor pulmonale (H)       Code Status: Full Code    Allergies:   Allergies   Allergen Reactions     No Known Drug Allergies        Activity level - Baseline/Home:  Independent    Activity Level - Current:   Independent     Needed?: No    Isolation: No  Infection: Not Applicable    Bariatric?: No    Vital Signs:   Vitals:    10/24/17 1941 10/24/17 2225   BP: 154/73 152/78   Pulse: 85    Resp: 18    Temp: 98.1  F (36.7  C)    TempSrc: Oral    SpO2: 98% 99%   Weight: 92.5 kg (204 lb)    Height: 1.88 m (6' 2\")        Cardiac Rhythm: ,        Pain level: 0-10 Pain Scale: 2    Is this patient confused?: No    Patient Report: Initial Complaint: Oliver Baires is a 77 year old male with a history of CAD, MI, and PE who presents with chest pain. The patient reports that he has been experiencing intermittent chest pain for the past 4 days. He first noticed this after performing some physical activity while waxing his car. He does note some shortness of breath but notes that he has some at baseline. He is also experiencing some leg swelling as well. Taking a deep breath does worsen his pain. The patient did have heart attack in 2003 followed by stent placement. He also notes that he has had a pulmonary embolism last year after extended travel. He was placed on Xarelto but does not take this any longer. He notes that the radiation of pain into his back is very similar to the pulmonary embolism that he has had in the past. He denies having had any urinary symptoms.  Focused Assessment: A/O x4, mild pain with deep inspiration, lungs clear  Tests Performed: Labs,CT chest PE protocol  Abnormal Results:   Results for orders placed or performed during the " hospital encounter of 10/24/17 (from the past 24 hour(s))   EKG 12 lead   Result Value Ref Range    Interpretation ECG Click View Image link to view waveform and result    CBC with platelets differential   Result Value Ref Range    WBC 4.9 4.0 - 11.0 10e9/L    RBC Count 4.31 (L) 4.4 - 5.9 10e12/L    Hemoglobin 14.1 13.3 - 17.7 g/dL    Hematocrit 40.6 40.0 - 53.0 %    MCV 94 78 - 100 fl    MCH 32.7 26.5 - 33.0 pg    MCHC 34.7 31.5 - 36.5 g/dL    RDW 13.0 10.0 - 15.0 %    Platelet Count 196 150 - 450 10e9/L    Diff Method Automated Method     % Neutrophils 54.5 %    % Lymphocytes 24.4 %    % Monocytes 15.8 %    % Eosinophils 4.9 %    % Basophils 0.2 %    % Immature Granulocytes 0.2 %    Nucleated RBCs 0 0 /100    Absolute Neutrophil 2.7 1.6 - 8.3 10e9/L    Absolute Lymphocytes 1.2 0.8 - 5.3 10e9/L    Absolute Monocytes 0.8 0.0 - 1.3 10e9/L    Absolute Eosinophils 0.2 0.0 - 0.7 10e9/L    Absolute Basophils 0.0 0.0 - 0.2 10e9/L    Abs Immature Granulocytes 0.0 0 - 0.4 10e9/L    Absolute Nucleated RBC 0.0    INR   Result Value Ref Range    INR 0.96 0.86 - 1.14   Comprehensive metabolic panel   Result Value Ref Range    Sodium 140 133 - 144 mmol/L    Potassium 3.7 3.4 - 5.3 mmol/L    Chloride 105 94 - 109 mmol/L    Carbon Dioxide 26 20 - 32 mmol/L    Anion Gap 9 3 - 14 mmol/L    Glucose 98 70 - 99 mg/dL    Urea Nitrogen 16 7 - 30 mg/dL    Creatinine 0.79 0.66 - 1.25 mg/dL    GFR Estimate >90 >60 mL/min/1.7m2    GFR Estimate If Black >90 >60 mL/min/1.7m2    Calcium 9.2 8.5 - 10.1 mg/dL    Bilirubin Total 0.4 0.2 - 1.3 mg/dL    Albumin 3.6 3.4 - 5.0 g/dL    Protein Total 7.8 6.8 - 8.8 g/dL    Alkaline Phosphatase 79 40 - 150 U/L    ALT 57 0 - 70 U/L    AST 47 (H) 0 - 45 U/L   Troponin I   Result Value Ref Range    Troponin I ES <0.015 0.000 - 0.045 ug/L   D dimer quantitative   Result Value Ref Range    D Dimer 0.9 (H) 0.0 - 0.50 ug/ml FEU   CT Chest Pulmonary Embolism w Contrast   Result Value Ref Range    Radiologist  flags Pulmonary embolism (AA)     Narrative    CT CHEST AND PULMONARY EMBOLISM ANGIOGRAM  10/24/2017 9:43 PM     HISTORY: Chest pain. Pulmonary embolism.     COMPARISON: 7/19/2017    TECHNIQUE: Volumetric helical acquisition of CT images of the chest  from the lung apices to the kidneys were acquired after the  administration of 74 mL Isovue 370 IV contrast. Radiation dose for  this scan was reduced using automated exposure control, adjustment of  the mA and/or kV according to patient size, or iterative  reconstruction technique.    FINDINGS: There is a single left lower lobe pulmonary embolism.  Minimal peripheral fibrosis and/or atelectasis noted in the lungs. The  heart is generous, particularly the right side of the heart. There are  extensive coronary vascular calcifications and/or stents consistent  with coronary artery disease. Thoracic aorta is atherosclerotic  without evidence of dissection or aneurysm. There is no pleural or  pericardial effusion. There is no pneumothorax. Adrenal glands are not  seen. Remainder of the visualized upper abdomen is unremarkable.      Impression    IMPRESSION:   1. Left lower lobe pulmonary embolism.  2. No thoracic aortic dissection or aneurysm.    [Critical Result: Pulmonary embolism]    Finding was identified on 10/24/2017 9:53 PM.     Dr. JOVAN GENAO was contacted by me at 10/24/2017 10:00 PM and  verbalized understanding of the critical finding.     EMMANUELLE RAMOS MD       Treatments provided: 1L NS bolus    Family Comments: wife and daughter present    OBS brochure/video discussed/provided to patient: Yes    ED Medications:   Medications   0.9% sodium chloride BOLUS (0 mLs Intravenous Stopped 10/24/17 2122)     Followed by   0.9% sodium chloride infusion (not administered)   iopamidol (ISOVUE-370) solution 74 mL (74 mLs Intravenous Given 10/24/17 2140)   saline flush (97 mLs Intravenous Given 10/24/17 2141)   rivaroxaban ANTICOAGULANT (XARELTO) tablet 10 mg (10 mg  Oral Given 10/24/17 8329)       Drips infusing?:  No      ED NURSE PHONE NUMBER: *[phone]

## 2017-10-25 NOTE — PHARMACY-ADMISSION MEDICATION HISTORY
Admission medication history interview status for the 10/24/2017  admission is complete. See EPIC admission navigator for prior to admission medications     Medication history source reliability:Good    Actions taken by pharmacist (provider contacted, etc):Verified all meds with patient's retail pharmacy records through [facility]     Additional medication history information not noted on PTA med list :None    Medication reconciliation/reorder completed by provider prior to medication history? Yes    Time spent in this activity: 10 minutes    Prior to Admission medications    Medication Sig Last Dose Taking? Auth Provider   Pantoprazole Sodium (PROTONIX PO) Take 40 mg by mouth daily 10/24/2017 at am Yes Unknown, Entered By History   ASPIRIN EC PO Take 81 mg by mouth daily 10/24/2017 at am Yes Unknown, Entered By History   atorvastatin (LIPITOR) 80 MG tablet TAKE 1 TABLET (80 MG) BY MOUTH DAILY 10/24/2017 at am Yes Renan Henry MD   Multiple Vitamins-Minerals (MULTIVITAMIN ADULTS PO) Take 1 tablet by mouth daily  10/24/2017 at am Yes Reported, Patient   lisinopril (PRINIVIL,ZESTRIL) 5 MG tablet Take 1 tablet (5 mg) by mouth daily 10/24/2017 at am Yes Renan Henry MD   nitroglycerin (NITROSTAT) 0.4 MG SL tablet Place 1 tablet (0.4 mg) under the tongue every 5 minutes as needed up to 3 tablets per episode. Past Week at Unknown time Yes Renan Henry MD   Cholecalciferol (VITAMIN D PO) Take 1 tablet by mouth every other day (OTC: Pt unsure of strength) Past Week at Unknown time Yes Reported, Patient

## 2017-10-25 NOTE — H&P
[facility]    History and Physical  Hospitalist       Date of Admission:  10/24/2017    Assessment & Plan   Oliver Baires is a 77 year old male who presents with approximately 4 days of pleuritic chest discomfort found to have an acute left-sided pulmonary embolism.     Left-sided pulmonary embolism, recurrent and unprovoked: Pulmonary embolism severity index of 87 given age and male sex. Anticipate secondary to DVT, most likely right-sided given history of such. No clinical evidence of asymmetric swelling or tenderness. States that his brother also had a blood clot after traveling by car for an extended distant, though daughter without any history of clotting.  -Received 10 mg Xarelto in the emergency department; unable to administer an additional 5 mg (discussed with pharmacy). Recommend lifelong anticoagulation. This was discussed with patient  -Xarelto 15 mg BID x 21 days initiating in AM, 20 mg daily thereafter. Has tolerated xarelto in the past  -Daily aspirin discontinued  -Age-appropriate cancer screening as an outpatient. Patient with a history of tubular adenomas on colonoscopy, though given advanced age, will defer any additional screening to patient's primary care provider. This was discussed with patient and family as well.  -Recommend delaying routine follow-up stress test, currently scheduled for approximately 4-5 weeks from now, given new diagnosis of pulmonary embolism.  -Monitor on telemetry overnight to evaluate for any evidence of dysrhythmia potentially precipitating pulmonary embolism  -Will not pursue TTE or lower extremity ultrasound at this time as patient is minimally symptomatic, with only complaint of pleuritic discomfort with deep inspiration. the patient does have some degree of right-sided cardiac enlargement on chest CT. Patient did have some mild right ventricular dilation on January TTE when diagnosed with initial pulmonary embolism. If patient develops symptoms such  as shortness of breath, hypoxia, orthopnea, I would pursue TTE to evaluate for degree of right heart strain as intervention may then include IVC filter. If no further symptoms, intervention would remain at lifelong anticoagulation.  -Discussed importance of pulmonary toilet  -Given age and recommendation for lifelong anticoagulation, I do not see a role for any genetic screening. Discussed this with daughter (now postmenopausal) in the setting of patient (her father) and her paternal uncle having a history of blood clots    Coronary artery disease: Patient follows with Dr. Villarreal of cardiology. Is due for routine follow-up stress test which patient has scheduled for mid to late November.  -Recommend rescheduling/delaying stress test for several months (minimum 8 weeks) as long as he remains asymptomatic given new diagnosis pulmonary embolism. Stress imaging with pulmonary embolism may result in false positive findings.   -Daily aspirin discontinued with initiation of Xarelto  -Prior to admission atorvastatin and lisinopril currently held given observation status. Resume at discharge    DVT Prophylaxis: xarelto    Code Status: Full Code    Disposition: Expected discharge likely 10/25/17 pending clinical stability. Patient admitted to observation status given elevated pulmonary embolism severity index score of 87 for age and male gender.    Solomon Marin [place]    Primary Care Physician   Renan Henry    Chief Complaint   Chest pain    History is obtained from the patient, chart review, discussion with Dr Veronica in the emergency department, daughter and wife at bedside.    History of Present Illness   Oliver Baires is a 77 year old male who presents with a four-day history of pleuritic chest discomfort. Patient states that initially he was having discomfort slightly on the left upper anterior chest with deep inspiration. Patient thought little of this, and anticipated that this was a muscular injury after waxing  his car. Discomfort was substantial enough that he thought he may have bruised his sternum. Patient only became concerned, however, when he noted that his discomfort was present along costovertebral angle posteriorly, as this is where he experienced discomfort during a recent diagnosis of pulmonary embolism. Patient states that this January, following a car ride to [place], he was diagnosed with a right lower extremity DVT and a pulmonary embolism. Patient was put on Xarelto for anticoagulation, and this was discontinued after 6 months given provoked DVT with pulmonary embolism. Patient tolerated Xarelto well, no adverse effects. Of note, patient did stop his aspirin while taking a blood thinner. Has a known history of coronary artery disease, though this is stable with no recent stenting (last in 2010). Patient without lower extremity swelling. No recent travel, immobilization, or injury. No lower extremity pain. Patient has not noted an acute change in his exercise tolerance, in fact went to the [employer] with his family yesterday and ambulated without difficulty. Patient does state that his stamina for physical exertion has been decreasing over the past several years, though again, nothing acute.     On presentation to the emergency department, heart rate is in the 60s, patient with a known history of bradycardia, no hypoxia, blood pressures in the 150 systolic range.    Past Medical History    I have reviewed this patient's medical history and updated it with pertinent information if needed.   Past Medical History:   Diagnosis Date     Arthritis     Left knee-L knee injury     AV block      Bradycardia     not on bb due to low HR     CAD (coronary artery disease)     Cath 6/2010- angioplasty & Xience 3.0x15mm stent to prox LAD; STEMI 10/2004- 3.5 x 33mm Cypher ALEJANDRA to distal RCA, angioplasty to VINCENT, Lcx25%, Lma 25%     ED (erectile dysfunction)      GERD (gastroesophageal reflux disease)      Hyperlipidemia       Hypertension      IBS (irritable bowel syndrome)      Impaired fasting glucose      MI (myocardial infarction)     STEMI 10/2004     Personal history of colonic polyps     Tubular adenomas excised 1999, 2002     Pneumonia 2004    pleural effusion     Skin cancer        Past Surgical History   I have reviewed this patient's surgical history and updated it with pertinent information if needed.  Past Surgical History:   Procedure Laterality Date     HC COLONOSCOPY THRU STOMA, DIAGNOSTIC  1999    Single tubular adenoma excised     HC COLONOSCOPY THRU STOMA, DIAGNOSTIC  2002    Tubular adenoma, hepatic flexure.     HC REMOVAL GALLBLADDER       HEART CATH, ANGIOPLASTY  6/23/2010    Angioplasty & Xience 3.0x15mm stent to prox LAD     HEART CATH, ANGIOPLASTY  10/30/2004    3.5x33mm Cypher stent to distal RCA; angioplasty to VINCENT     SURGICAL HISTORY OF -   11/04    Angioplasty/stenting of RCA       Prior to Admission Medications   Prior to Admission Medications   Prescriptions Last Dose Informant Patient Reported? Taking?   Cholecalciferol (VITAMIN D PO)   Yes Yes   Sig: Take 1 tablet by mouth.   Multiple Vitamins-Minerals (MULTIVITAMIN ADULTS PO)   Yes Yes   atorvastatin (LIPITOR) 80 MG tablet   No Yes   Sig: TAKE 1 TABLET (80 MG) BY MOUTH DAILY   lisinopril (PRINIVIL,ZESTRIL) 5 MG tablet   No No   Sig: Take 1 tablet (5 mg) by mouth daily   Patient not taking: Reported on 4/13/2017   nitroglycerin (NITROSTAT) 0.4 MG SL tablet Past Week at Unknown time  No Yes   Sig: Place 1 tablet (0.4 mg) under the tongue every 5 minutes as needed up to 3 tablets per episode.      Facility-Administered Medications: None     Allergies   Allergies   Allergen Reactions     No Known Drug Allergies        Social History   I have reviewed this patient's social history and updated it with pertinent information if needed. Oliver Baires  reports that he quit smoking about 37 years ago. He has never used smokeless tobacco. He reports that he  drinks alcohol. He reports that he does not use illicit drugs.    Family History   I have reviewed this patient's family history and updated it with pertinent information if needed.   Family History   Problem Relation Age of Onset     CANCER Father      Pancreatic CA,  age 82     Cardiovascular Mother       of ruptured AAA age 92     CANCER Brother      liver ca       Review of Systems   The 10 point Review of Systems is negative other than noted in the HPI or here.   No fevers or chills  No cough or sputum production  No shortness of breath reported  Reports gaining some weight and abdominal girth over the past several years.    Physical Exam   Temp: 98.1  F (36.7  C) Temp src: Oral BP: 154/73 Pulse: 85   Resp: 18 SpO2: 98 % O2 Device: None (Room air)    Vital Signs with Ranges  Temp:  [98.1  F (36.7  C)] 98.1  F (36.7  C)  Pulse:  [85] 85  Resp:  [18] 18  BP: (154)/(73) 154/73  SpO2:  [98 %] 98 %  204 lbs 0 oz    Constitutional: no acute distress, alert, conversant, robust appearing 77-year-old male.  Eyes: no scleral icterus or injection  Respiratory: breath sounds clear bilaterally to auscultation, no wheezes, no crackles  Cardiovascular: regular rate and rhythm, no murmur. Rate of approximately 65  GI: abdomen soft, non-tender, normoactive bowel sounds, no masses  Lymph/Hematologic: no lower extremity swelling  Genitourinary: not examined  Skin: no rashes  Musculoskeletal: muscular tone intact in all ex. No tenderness to palpation of bilateral lower extremities  Neurologic: mental status grossly intact, no focal deficits, alert  Psychiatric: normal affect    Data   Data reviewed today:  I personally reviewed the chest CT image(s) showing Left-sided pulmonary embolism.    Recent Labs  Lab 10/24/17  2021   WBC 4.9   HGB 14.1   MCV 94      INR 0.96      POTASSIUM 3.7   CHLORIDE 105   CO2 26   BUN 16   CR 0.79   ANIONGAP 9   DALIA 9.2   GLC 98   ALBUMIN 3.6   PROTTOTAL 7.8   BILITOTAL 0.4    ALKPHOS 79   ALT 57   AST 47*   TROPI <0.015       Recent Results (from the past 24 hour(s))   CT Chest Pulmonary Embolism w Contrast   Result Value    Radiologist flags Pulmonary embolism (AA)    Narrative    CT CHEST AND PULMONARY EMBOLISM ANGIOGRAM  10/24/2017 9:43 PM     HISTORY: Chest pain. Pulmonary embolism.     COMPARISON: 7/19/2017    TECHNIQUE: Volumetric helical acquisition of CT images of the chest  from the lung apices to the kidneys were acquired after the  administration of 74 mL Isovue 370 IV contrast. Radiation dose for  this scan was reduced using automated exposure control, adjustment of  the mA and/or kV according to patient size, or iterative  reconstruction technique.    FINDINGS: There is a single left lower lobe pulmonary embolism.  Minimal peripheral fibrosis and/or atelectasis noted in the lungs. The  heart is generous, particularly the right side of the heart. There are  extensive coronary vascular calcifications and/or stents consistent  with coronary artery disease. Thoracic aorta is atherosclerotic  without evidence of dissection or aneurysm. There is no pleural or  pericardial effusion. There is no pneumothorax. Adrenal glands are not  seen. Remainder of the visualized upper abdomen is unremarkable.      Impression    IMPRESSION:   1. Left lower lobe pulmonary embolism.  2. No thoracic aortic dissection or aneurysm.    [Critical Result: Pulmonary embolism]    Finding was identified on 10/24/2017 9:53 PM.     Dr. JOVAN GENAO was contacted by me at 10/24/2017 10:00 PM and  verbalized understanding of the critical finding.

## 2017-10-25 NOTE — DISCHARGE SUMMARY
[facility]    Discharge Summary  Hospitalist    Date of Admission:  10/24/2017  Date of Discharge:  10/25/2017  Discharging Provider: Naren Florence PA-C    Discharge Diagnoses   Other acute pulmonary embolism without acute cor pulmonale (H)    History of Present Illness   Oliver Baires is an 77 year old male who presented with 4 days of pleuritic chest discomfort.    HPI from admission H&P:  Oliver Baires is a 77 year old male who presents with a four-day history of pleuritic chest discomfort. Patient states that initially he was having discomfort slightly on the left upper anterior chest with deep inspiration. Patient thought little of this, and anticipated that this was a muscular injury after waxing his car. Discomfort was substantial enough that he thought he may have bruised his sternum. Patient only became concerned, however, when he noted that his discomfort was present along costovertebral angle posteriorly, as this is where he experienced discomfort during a recent diagnosis of pulmonary embolism. Patient states that this January, following a car ride to [place], he was diagnosed with a right lower extremity DVT and a pulmonary embolism. Patient was put on Xarelto for anticoagulation, and this was discontinued after 6 months given provoked DVT with pulmonary embolism. Patient tolerated Xarelto well, no adverse effects. Of note, patient did stop his aspirin while taking a blood thinner. Has a known history of coronary artery disease, though this is stable with no recent stenting (last in 2010). Patient without lower extremity swelling. No recent travel, immobilization, or injury. No lower extremity pain. Patient has not noted an acute change in his exercise tolerance, in fact went to the [employer] with his family yesterday and ambulated without difficulty. Patient does state that his stamina for physical exertion has been decreasing over the past several years, though again, nothing  acute.      On presentation to the emergency department, heart rate is in the 60s, patient with a known history of bradycardia, no hypoxia, blood pressures in the 150 systolic range.    Hospital Course   Oliver Baires was admitted on 10/24/2017.  The following problems were addressed during his hospitalization:    Left-sided pulmonary embolism, recurrent and unprovoked  Pulmonary embolism severity index of 87 given age and male sex. Anticipate secondary to DVT, most likely right-sided given history of such. No clinical evidence of asymmetric swelling or tenderness. Patient was not hypoxic or tachycardic on evaluation, recommended to be admitted for observation given elevated severity index. He was started on Xarelto 15mg BID, which he tolerated well. He was monitored on telemetry without arrythmias, continued to have stable hemodynamics without hypoxia and felt safe to discharge home following morning. He was discharged with prescriptions for Xarelto 15mg BID x21d as well as 20mg daily to start on 11/15/17. His PTA daily Aspirin was discontinued. He will follow-up with his PCP regarding age-appropriate cancer screening, follow-up stress testing (as below), and management of his lifelong anticoagulation.    Coronary artery disease  Patient follows with Dr. Villarreal of cardiology. Is due for routine follow-up stress test which patient has scheduled for mid to late November. Recommended rescheduling/delaying stress test for several months (minimum 8 weeks) as long as he remains asymptomatic given new diagnosis pulmonary embolism. Resume PTA atorvastatin and lisinopril at discharge.    Naren Florence PA-C    Significant Results and Procedures   As noted below    Pending Results   These results will be followed up by n/a  Unresulted Labs Ordered in the Past 30 Days of this Admission     No orders found for last 61 day(s).          Code Status   Full Code       Primary Care Physician   Renan Henry    Physical Exam    Temp: 96.8  F (36  C) Temp src: Oral BP: 112/59 Pulse: 85 Heart Rate: 59 Resp: 16 SpO2: 96 % O2 Device: None (Room air)    Vitals:    10/24/17 1941   Weight: 92.5 kg (204 lb)     Vital Signs with Ranges  Temp:  [96.8  F (36  C)-98.1  F (36.7  C)] 96.8  F (36  C)  Pulse:  [85] 85  Heart Rate:  [59] 59  Resp:  [16-18] 16  BP: (112-154)/(59-78) 112/59  SpO2:  [96 %-99 %] 96 %  I/O last 3 completed shifts:  In: 348 [P.O.:240; I.V.:108]  Out: -     GEN: well-developed, well-nourished, appears comfortable  PULM: lungs CTA bilaterally, no increased work of breathing, no wheeze, rales, rhonchi  CV: bradycardic, normal rhythm, S1 & S2, no murmur  GI: soft, nontender, nondistended, no guarding or rigidity, +BS in all 4 quadrants  SKIN: warm & dry without rash, wound, or pedal edema, no bruising or bleeding    Discharge Disposition   Discharged to home  Condition at discharge: Stable    Consultations This Hospital Stay   None    Time Spent on this Encounter   I, Naren Florence, personally saw the patient today and spent less than or equal to 30 minutes discharging this patient.    Discharge Orders     Reason for your hospital stay   You were admitted due to findings of a pulmonary embolism to monitor your heart rate and rhythm overnight. You were stable and started on Xarelto, which will need to be continued lifelong. You are to check in with your primary care provider regarding this and any additional workup needed.     Follow-up and recommended labs and tests    Follow up with primary care provider, Renan Henry, within 4-6 weeks for hospital follow- up, regarding new diagnosis and to follow up on results.     Activity   Your activity upon discharge: activity as tolerated     Diet   Follow this diet upon discharge: Orders Placed This Encounter     Regular Diet Adult       Discharge Medications   Current Discharge Medication List      START taking these medications    Details   !! rivaroxaban ANTICOAGULANT (XARELTO) 15  MG TABS tablet Take 1 tablet (15 mg) by mouth 2 times daily (with meals) for 20 days  Qty: 40 tablet, Refills: 0    Associated Diagnoses: Other acute pulmonary embolism without acute cor pulmonale (H)      !! rivaroxaban ANTICOAGULANT (XARELTO) 20 MG TABS tablet Take 1 tablet (20 mg) by mouth daily (with dinner)  Qty: 30 tablet, Refills: 1    Comments: Start taking on 11/15/17.  Associated Diagnoses: Other acute pulmonary embolism without acute cor pulmonale (H)       !! - Potential duplicate medications found. Please discuss with provider.      CONTINUE these medications which have NOT CHANGED    Details   Pantoprazole Sodium (PROTONIX PO) Take 40 mg by mouth daily      atorvastatin (LIPITOR) 80 MG tablet TAKE 1 TABLET (80 MG) BY MOUTH DAILY  Qty: 30 tablet, Refills: 0    Comments: Medication is being filled for 1 time refill only due to:  Patient needs labs fasting.  Associated Diagnoses: Hyperlipidemia LDL goal <100      Multiple Vitamins-Minerals (MULTIVITAMIN ADULTS PO) Take 1 tablet by mouth daily       lisinopril (PRINIVIL,ZESTRIL) 5 MG tablet Take 1 tablet (5 mg) by mouth daily  Qty: 90 tablet, Refills: 3    Associated Diagnoses: Essential hypertension      nitroglycerin (NITROSTAT) 0.4 MG SL tablet Place 1 tablet (0.4 mg) under the tongue every 5 minutes as needed up to 3 tablets per episode.  Qty: 60 tablet, Refills: 1    Comments: Profile Rx: patient will contact pharmacy when needed      Cholecalciferol (VITAMIN D PO) Take 1 tablet by mouth every other day (OTC: Pt unsure of strength)         STOP taking these medications       ASPIRIN EC PO Comments:   Reason for Stopping:             Allergies   Allergies   Allergen Reactions     No Known Drug Allergies      Data   Most Recent 3 CBC's:  Recent Labs   Lab Test  10/24/17   2021  07/30/16   1048  10/24/15   1257   WBC  4.9  9.1  8.2   HGB  14.1  13.8  13.3   MCV  94  96  97   PLT  196  180  248      Most Recent 3 BMP's:  Recent Labs   Lab Test  10/25/17    0550  10/24/17   2021  07/30/16   1048  10/24/15   1257   NA   --   140  138  137   POTASSIUM   --   3.7  4.1  4.1   CHLORIDE   --   105  106  104   CO2   --   26  25  26   BUN   --   16  13  12   CR  0.80  0.79  0.84  0.82   ANIONGAP   --   9  7  7   DALIA   --   9.2  8.8  9.2   GLC   --   98  122*  118*     Most Recent 2 LFT's:  Recent Labs   Lab Test  10/24/17   2021  07/30/16   1048   AST  47*  32   ALT  57  35   ALKPHOS  79  71   BILITOTAL  0.4  1.0     Most Recent INR's and Anticoagulation Dosing History:  Anticoagulation Dose History     Recent Dosing and Labs Latest Ref Rng & Units 6/23/2010 10/24/2017    INR 0.86 - 1.14 0.98 0.96        Most Recent 3 Troponin's:  Recent Labs   Lab Test  10/24/17   2021  10/16/15   2005  07/02/10   0256   TROPI  <0.015  0.016  <0.012     Most Recent Cholesterol Panel:  Recent Labs   Lab Test  08/08/16   1042   CHOL  170   LDL  97   HDL  56   TRIG  83     Most Recent 6 Bacteria Isolates From Any Culture (See EPIC Reports for Culture Details):  Recent Labs   Lab Test  04/08/12   1000   CULT  No Salmonella, Shigella, Campylobacter or E coli 0157 isolated.     Most Recent TSH, T4 and A1c Labs:  Recent Labs   Lab Test  10/29/15   1034   TSH  1.69     Results for orders placed or performed during the hospital encounter of 10/24/17   CT Chest Pulmonary Embolism w Contrast     Value    Radiologist flags Pulmonary embolism (AA)    Narrative    CT CHEST AND PULMONARY EMBOLISM ANGIOGRAM  10/24/2017 9:43 PM     HISTORY: Chest pain. Pulmonary embolism.     COMPARISON: 7/19/2017    TECHNIQUE: Volumetric helical acquisition of CT images of the chest  from the lung apices to the kidneys were acquired after the  administration of 74 mL Isovue 370 IV contrast. Radiation dose for  this scan was reduced using automated exposure control, adjustment of  the mA and/or kV according to patient size, or iterative  reconstruction technique.    FINDINGS: There is a single left lower lobe pulmonary  embolism.  Minimal peripheral fibrosis and/or atelectasis noted in the lungs. The  heart is generous, particularly the right side of the heart. There are  extensive coronary vascular calcifications and/or stents consistent  with coronary artery disease. Thoracic aorta is atherosclerotic  without evidence of dissection or aneurysm. There is no pleural or  pericardial effusion. There is no pneumothorax. Adrenal glands are not  seen. Remainder of the visualized upper abdomen is unremarkable.      Impression    IMPRESSION:   1. Left lower lobe pulmonary embolism.  2. No thoracic aortic dissection or aneurysm.    [Critical Result: Pulmonary embolism]    Finding was identified on 10/24/2017 9:53 PM.     Dr. JOVAN GENAO was contacted by me at 10/24/2017 10:00 PM and  verbalized understanding of the critical finding.     EMMANUELLE RAMOS MD

## 2017-10-25 NOTE — PROGRESS NOTES
Observation goals PRIOR TO DISCHARGE     Comments: List all goals to be met before discharge home:   - Anticoagulants initiated.  Met  - Patient or patient's representative demonstrates ability to administer Low Molecular Weight Heparin (LMWH) or home health is arranged. Not met  - Patient education re: Deep Vein Thrombosis, LMWH, Coumadin.  Not met  - Diagnostic tests and consults completed and resulted  Partial met  - Vital signs normal or at patient baseline partial met  - Adequate pain control on oral analgesia if ordered ,met  - Return to baseline functional status not met  - Safe disposition plan has been identified not met  - Nurse to notify provider when observation goals have been met and patient is ready for discharge.     Patient A/Ox4. Bradycardic asymptomatic other VSS on RA.  [place] bilateral hearing aids. Tele : SB 1st degree AV block. Tolerating regular diet. Up independently. Pain rated 2/10 with deep breath relaxation techniques utilized, declined pharmacological intervention. Patient reports tingling in left fingers. Will continue to monitor

## 2017-10-25 NOTE — ED PROVIDER NOTES
History     Chief Complaint:  Chest pain    HPI   Oliver Baires is a 77 year old male with a history of CAD, MI, and PE who presents with chest pain. The patient reports that he has been experiencing intermittent chest pain for the past 4 days. He first noticed this after performing some physical activity while waxing his car. He does note some shortness of breath but notes that he has some at baseline. He is also experiencing some leg swelling as well, mainly on the right, but that is not new. Taking a deep breath does worsen his pain. The patient did have heart attack in 2003 followed by stent placement. He also notes that he had a pulmonary embolism last year after extended travel. He was placed on Xarelto but does not take this any longer, having stopped it about 3 months ago. He notes that the radiation of pain into his back is very similar to the pulmonary embolism that he has had in the past. He denies having any urinary symptoms.    Cardiac/PE/DVT Risk Factors:  History of hypertension - yes  History of hyperlipidemia - yes  History of diabetes - no  History of smoking - yes  Personal history of PE/DVT - yes  Family history of PE/DVT - no  Family history of heart complications - yes  Recent travel - no  Recent surgery - no  Other immobilizations - no  Cancer - no     Allergies:  No Known Drug Allergies     Medications:    Atorvastatin  Nitroglycerin  Lisinopril    Past Medical History:    Arthritis  AV block  Bradycardia  CAD  ED  GERD  Hyperlipidemia  Hypertension  IBS  Impaired fasting glucose  MI  Colonic polyps  Pneumonia  Skin cancer    Past Surgical History:    Colonoscopy thru stoma  Cholecystectomy  Heart cath angioplasty    Family History:    Cancer  Abdominal aortic aneurysm    Social History:  The patient was accompanied to the ED by his wife.  Smoking Status: Former  Smokeless Tobacco: No  Alcohol Use: No   Marital Status:   [2]    Review of Systems   Respiratory: Positive for shortness  "of breath.    Cardiovascular: Positive for chest pain and palpitations.   Genitourinary: Negative for dysuria, frequency and hematuria.   Musculoskeletal: Positive for back pain.   All other systems reviewed and are negative.    Physical Exam   Vitals:  Patient Vitals for the past 24 hrs:   BP Temp Temp src Pulse Resp SpO2 Height Weight   10/24/17 1941 154/73 98.1  F (36.7  C) Oral 85 18 98 % 1.88 m (6' 2\") 92.5 kg (204 lb)      Physical Exam  Nursing note and vitals reviewed.  Constitutional:  Oriented to person, place, and time. Cooperative.   HENT:   Nose:    Nose normal.   Mouth/Throat:   Mucous membranes are normal.   Eyes:    Conjunctivae normal and EOM are normal.      Pupils are equal, round, and reactive to light.   Neck:    Trachea normal.   Cardiovascular:  Normal rate, regular rhythm, normal heart sounds and normal pulses. No murmur heard.  Pulmonary/Chest:  Effort normal and breath sounds normal.   Abdominal:   Soft. Normal appearance and bowel sounds are normal.      There is no tenderness.      There is no rebound and no CVA tenderness.   Musculoskeletal:  Extremities atraumatic x 4. Slight swelling to right lower leg.  Lymphadenopathy:  No cervical adenopathy.   Neurological:   Alert and oriented to person, place, and time. Normal strength.      No cranial nerve deficit or sensory deficit. GCS eye subscore is 4. GCS verbal subscore is 5. GCS motor subscore is 6.   Skin:    Skin is intact. No rash noted.   Psychiatric:   Normal mood and affect.   Emergency Department Course     ECG:  ECG taken at 1945, ECG read at 2000  Sinus rhythm with 1st degree AV block with premature atrial complexes. Otherwise normal ECG  Rate 72 bpm. ME interval 234. QRS duration 90. QT/QTc 382/418. P-R-T axes 66, -2, 60.     Imaging:  Radiology findings were communicated with the patient who voiced understanding of the findings.  CT chest pulmonary embolism w contrast:  IMPRESSION:   1. Left lower lobe pulmonary embolism.  2. " No thoracic aortic dissection or aneurysm.  Reading per radiology.     Laboratory:  Laboratory findings were communicated with the patient who voiced understanding of the findings.  CBC: RBC: 4.31(L), o/w WNL (WBC 4.9, HGB 14.1, )  INR: 0.96  CMP: AWNL (Creatinine 0.79)  Troponin (Collected 2021): <0.015   D Dimer (Collected 2021): 0.9(H)     Interventions:  2122 Normal Saline 1000 mL IV   2231 Xarelto 10 mg oral    Emergency Department Course:  Nursing notes and vitals reviewed.  I performed an exam of the patient as documented above.   IV was inserted and blood was drawn for laboratory testing, results above.   The patient was sent for a CT while in the emergency department, results above.      I discussed the treatment plan with the patient. They expressed understanding of this plan and consented to admission. I discussed the patient with Dr. Fink, who will admit the patient to a monitored bed for further evaluation and treatment.     I personally reviewed the laboratory results with the Patient and answered all related questions prior to admission.    Impression & Plan      Medical Decision Making:  Oliver Baires is a 77 year old male who presents to the emergency department today with chest pain and concern that he might have a recurrent pulmonary embolism. I felt it was reassuring that he had normal vital signs and ECG here. However with his history of pulmonary embolism, I proceeded with the above workup including the CT scan. He does in fact have a pulmonary embolism in the left lower lobe. While his vital signs are normal and his troponin is normal, I think it is best that he be brought in to the hospital due to his PESI score of 87. I spoke with Dr. Fink, who will be taking care of him. He was provided oral Xarelto as well.      PESI Score for estimating PE Associated 30-Day Mortality (calculator)  Background  Estimates the 30-day mortality risk associated with pulmonary embolism based on 11 criteria  including age, gender, cancer history, CHF, COPD, heart rate >110, SBP <100, RR >30, Tm <96.8 F, O2 Sat <90, and altered mental status.   Data  77 year old  has Impotence of organic origin; GERD (gastroesophageal reflux disease); Essential hypertension; Advanced directives, counseling/discussion; CAD (coronary artery disease); Hx of myocardial infarction; Bradycardia; Arthritis; AV block; Hyperlipidemia with target LDL less than 100; and Other pulmonary embolism without acute cor pulmonale, unspecified chronicity (H) on his problem list.  Temp: 98.1  F (36.7  C)  BP: 154/73  Pulse: 85  Resp: 18  SpO2: 98 %  Criteria  NEGATIVE  Score 10: Chronic lung disease  Score 20: Heart rate >110 bpm  Score 30: Systolic blood pressure <100 mmHg  Score 20: Respiratory rate >30 bpm  Score 20: Temperature <36 C  Score 60: Altered mental status  Score 20: Oxygen Saturation <90%  Interpretation  PESI Score: 87  Score <106: Class 3 - Moderate 30-day mortality risk (3.2 to 7.1%)       Diagnosis:    ICD-10-CM    1. Other acute pulmonary embolism without acute cor pulmonale (H) I26.99         Disposition:   Admitted    CMS Diagnoses: None     Scribe Disclosure:  Mahad GURROLA, am serving as a scribe at 8:03 PM on 10/24/2017 to document services personally performed by Dagoberto Veronica MD, based on my observations and the provider's statements to me.    EMERGENCY DEPARTMENT       Dagoberto Veronica MD  10/24/17 9568

## 2017-10-25 NOTE — PROGRESS NOTES
Observation goals PRIOR TO DISCHARGE     Comments: List all goals to be met before discharge home:   - Anticoagulants initiated.  Met  - Patient or patient's representative demonstrates ability to administer Low Molecular Weight Heparin (LMWH) or home health is arranged. Not met  - Patient education re: Deep Vein Thrombosis, LMWH, Coumadin.  Not met  - Diagnostic tests and consults completed and resulted  Partial met  - Vital signs normal or at patient baseline partial met  - Adequate pain control on oral analgesia if ordered ,met  - Return to baseline functional status not met  - Safe disposition plan has been identified not met  - Nurse to notify provider when observation goals have been met and patient is ready for discharge.      Patient A/Ox4. Bradycardic asymptomatic other VSS on RA.  [place] bilateral hearing aids. Tele : SB 1st degree AV block. Tolerating regular diet. Up independently. Pain rated 2/10 with deep breath relaxation techniques utilized, declined pharmacological intervention. Patient reports tingling in left fingers Rested between cares. Will continue to monitor

## 2017-10-26 ENCOUNTER — TELEPHONE (OUTPATIENT)
Dept: INTERNAL MEDICINE | Facility: CLINIC | Age: 77
End: 2017-10-26

## 2017-10-26 NOTE — TELEPHONE ENCOUNTER
"Hospital/TCU/ED for chronic condition Discharge Protocol    \"Hi, my name is Jasmin Denis, a registered nurse, and I am calling from [facility].  I am calling to follow up and see how things are going for you after your recent emergency visit/hospital/TCU stay.\"    Tell me how you are doing now that you are home?\" Patient reported to be feeling well.  Denies SOB/breathing difficulties or CP.  No questions or concerns.        Discharge Instructions    \"Let's review your discharge instructions.  What is/are the follow-up recommendations?  Pt. Response: Follow up with PCP     \"Has an appointment with your primary care provider been scheduled?\"   No (schedule appointment) 11/2/17    \"When you see the provider, I would recommend that you bring your medications with you.\"    Medications    \"Tell me what changed about your medicines when you discharged?\"    Changes to chronic meds?    0-1    \"What questions do you have about your medications?\"    None     New diagnoses of heart failure, COPD, diabetes, or MI?    No                  Medication reconciliation completed? Yes  Was MTM referral placed (*Make sure to put transitions as reason for referral)?   No    Call Summary    \"What questions or concerns do you have about your recent visit and your follow-up care?\"     none    \"If you have questions or things don't continue to improve, we encourage you contact us through the main clinic number (give number).  Even if the clinic is not open, triage nurses are available 24/7 to help you.     We would like you to know that our clinic has extended hours (provide information).  We also have urgent care (provide details on closest location and hours/contact info)\"      \"Thank you for your time and take care!\"         "

## 2017-11-01 ENCOUNTER — OFFICE VISIT (OUTPATIENT)
Dept: INTERNAL MEDICINE | Facility: CLINIC | Age: 77
End: 2017-11-01
Payer: COMMERCIAL

## 2017-11-01 VITALS
SYSTOLIC BLOOD PRESSURE: 122 MMHG | DIASTOLIC BLOOD PRESSURE: 20 MMHG | OXYGEN SATURATION: 98 % | WEIGHT: 204.1 LBS | TEMPERATURE: 97.5 F | HEART RATE: 94 BPM | BODY MASS INDEX: 26.2 KG/M2

## 2017-11-01 DIAGNOSIS — I10 ESSENTIAL HYPERTENSION: ICD-10-CM

## 2017-11-01 DIAGNOSIS — R04.0 EPISTAXIS: ICD-10-CM

## 2017-11-01 DIAGNOSIS — E78.5 HYPERLIPIDEMIA LDL GOAL <100: ICD-10-CM

## 2017-11-01 DIAGNOSIS — I25.10 CORONARY ARTERY DISEASE INVOLVING NATIVE CORONARY ARTERY OF NATIVE HEART WITHOUT ANGINA PECTORIS: ICD-10-CM

## 2017-11-01 DIAGNOSIS — I26.99 OTHER ACUTE PULMONARY EMBOLISM WITHOUT ACUTE COR PULMONALE (H): Primary | ICD-10-CM

## 2017-11-01 DIAGNOSIS — E78.5 HYPERLIPIDEMIA WITH TARGET LDL LESS THAN 100: ICD-10-CM

## 2017-11-01 PROCEDURE — 99496 TRANSJ CARE MGMT HIGH F2F 7D: CPT | Performed by: INTERNAL MEDICINE

## 2017-11-01 NOTE — MR AVS SNAPSHOT
After Visit Summary   11/1/2017    Oliver Baires    MRN: [id number]           Patient Information     Date Of Birth          1940        Visit Information        Provider Department      11/1/2017 8:20 AM Van Mccoy MD [facility]        Today's Diagnoses     Other acute pulmonary embolism without acute cor pulmonale (H)    -  1    Coronary artery disease involving native coronary artery of native heart without angina pectoris        Epistaxis        Hyperlipidemia LDL goal <100        Essential hypertension        Hyperlipidemia with target LDL less than 100          Care Instructions    *  Finish the Xarelto twice per day dosing in another 2 weeks, then change to Xarelto 20 mg once per day starting 11/15/17.      *  Your cancer screening is up to date, with the exception of needing another colonoscopy.     *  I would wait for the colonoscopy in 3-6 months.     *  I do not see any indication for an ENT cancer at this time.  If you develop significant episodes of bloody noses, then you should see the ENT doctors.     *  Monitor the irritation inside the left nostril, if it is still present in 3 months then have Dr. Henry evaluate and consider referral to ENT if needed.     *  Use saline nasal spray as needed to prevent bloody noses due to dry weather.     *  Reschedule your phsyical for 3-6 months.     *  Continue all medications at the same doses.  Contact your usual pharmacy if you need refills.     *  Return to see Dr. Henry in 3 months, sooner if needed.  Please get fasting labs done at the [facility] or any other [facility] lab 1-2 days before this appointment.  If you get the labs done at another [facility], make arrangements with them directly.  The orders will be in place.  Eat nothing for at least 8 hours prior to having these labs drawn.  Call [phone] to schedule appointments at [facility].       5 GOALS TO  "PREVENT VASCULAR DISEASE:     1.  Aggressive blood pressure control, under 130/80 ideally.  Using medications if needed.    Your blood pressure is under good control    BP Readings from Last 4 Encounters:   11/01/17 (!) 122/20   10/25/17 112/59   07/19/17 148/80   04/13/17 118/64       2.  Aggressive LDL cholesterol (\"bad cholesterol\") lowering as indicated.    Your goal is an LDL under 130 for sure, preferably under 100.  (If you have diabetes or previous vascular disease, the the LDL goals would be under 100 for sure, preferably under 70.)    New guidelines identify four high-risk groups who could benefit from statins:   *people with pre-existing heart disease, such as those who have had a heart attack;   *people ages 40 to 75 who have diabetes of any type  *patients ages 40 to 75 with at least a 7.5% risk of developing cardiovascular disease over the next decade, according to a formula described in the guidelines  *patients with the sort of super-high cholesterol that sometimes runs in families, as evidenced by an LDL of 190 milligrams per deciliter or higher    Your cholesterol levels are well controlled.    Recent Labs   Lab Test  08/08/16   1042  10/29/15   1034  09/12/14   1013   CHOL  170  153  145   HDL  56  50  70   LDL  97  89  59   TRIG  83  72  78   CHOLHDLRATIO   --   3.1  2.1       3.  Aggressive diabetic prevention, screening and/or management.      You do not have diabetes as of the most recent blood tests.     4.  No smoking    5.  Consider taking low dose aspirin (81 mg) tablet once per day over the age of 50, every day unless there is a specific reason that you cannot take aspirin (such as side effect, allergy, or you are on another \"blood thinner\").        --Based on your current cardiac risk factors, you should take Aspirin 81 mg once per day if you are over 50 years of age.                 Follow-ups after your visit        Your next 10 appointments already scheduled     Nov 02, 2017 11:00 AM " CDT   Office Visit with Renan Henry MD   [facility]    [address] [facility] [address] [phone]           Bring a current list of meds and any records pertaining to this visit. For Physicals, please bring immunization records and any forms needing to be filled out. Please arrive 10 minutes early to complete paperwork.            Nov 30, 2017  8:00 AM CST   NM SH CV MPI MULT RST ST 1 DAY with SCINM1   [facility] CV Nuclear Medicine (Cardi[facility])    [address] [facility] [address]  [address]  [facility] [address] [phone]           For a ONE day exam: Allow 3-4 hours for test. For a TWO day exam: Allow 2 hours PER day for test.  You may need to stop some medicines before the test. Follow your doctor s orders. - If you take a beta blocker: Follow your doctor s specific instructions on taking it prior to and on the day of your exam. - If you take Aggrenox or dipyridamole (Persantine, Permole), stop taking it 48 hours before your test. - If you take Viagra, Cialis or Levitra, stop taking it 48 hours before your test. - If you take theophylline or aminophylline, stop taking it 12 hours before your test.  For patients with diabetes: - If you take insulin, call your diabetes care team. Ask if you should take a 1/2 dose the morning of your test. - If you take diabetes medicine by mouth, don t take it on the morning of your test. Bring it with you to take after the test. (If you have questions, call your diabetes care team.)  Do not take nitrates on the day of your test. Do not wear your Nitro-Patch.  Stop all caffeine 12 hours before the test. This includes coffee, tea, soda pop, chocolate and certain medicines (such as Anacin, Excedrin and NoDoz). Also avoid decaf coffee and tea, as these contain small amounts of caffeine.  No alcohol, smoking or other tobacco for 12 hours before the  test.  Stop eating 3 hours before the test. You may drink water.  Please wear a loose two-piece outfit. If you will have an exercise test, bring rubber-soled walking shoes.  When you arrive, please tell us if you: - Have diabetes - Are breastfeeding - May be pregnant - Have a pacemaker of ICD (implantable defibrillator).  Please call your Imaging Department at your exam site with any questions.            Dec 26, 2017  8:45 AM CST   Return Visit with Tommy Villarreal MD   [facility] ([facility])    [address] [place] [address] [phone]              Future tests that were ordered for you today     Open Future Orders        Priority Expected Expires Ordered    CBC with platelets differential ASAP 2/1/2018 5/1/2018 11/1/2017    Comprehensive metabolic panel Routine 2/1/2018 5/1/2018 11/1/2017    Lipid panel reflex to direct LDL Fasting Routine 2/1/2018 5/1/2018 11/1/2017            Who to contact     If you have questions or need follow up information about today's clinic visit or your schedule please contact [facility] directly at [phone].  Normal or non-critical lab and imaging results will be communicated to you by MyChart, letter or phone within 4 business days after the clinic has received the results. If you do not hear from us within 7 days, please contact the clinic through [employer] or phone. If you have a critical or abnormal lab result, we will notify you by phone as soon as possible.  Submit refill requests through [employer] or call your pharmacy and they will forward the refill request to us. Please allow 3 business days for your refill to be completed.          Additional Information About Your Visit        [employer] Information     [employer] lets you send messages to your doctor, view your test results, renew your prescriptions, schedule appointments and more. To sign up, go to [email or web address] .  "Click on \"Log in\" on the left side of the screen, which will take you to the Welcome page. Then click on \"Sign up Now\" on the right side of the page.     You will be asked to enter the access code listed below, as well as some personal information. Please follow the directions to create your username and password.     Your access code is: [email or web address]  Expires: 2018 11:50 AM     Your access code will  in 90 days. If you need help or a new code, please call your [place] clinic or [phone].        Care EveryWhere ID     This is your Care EveryWhere ID. This could be used by other organizations to access your [place] medical records  [id number]        Your Vitals Were     Pulse Temperature Pulse Oximetry BMI (Body Mass Index)          94 97.5  F (36.4  C) (Oral) 98% 26.2 kg/m2         Blood Pressure from Last 3 Encounters:   17 (!) 122/20   10/25/17 112/59   17 148/80    Weight from Last 3 Encounters:   17 204 lb 1.6 oz (92.6 kg)   10/24/17 204 lb (92.5 kg)   17 205 lb 14.6 oz (93.4 kg)               Primary Care Provider Office Phone # Fax #    Renan Henry [phone]       [address] [address]        Equal Access to Services     MASSIMO NAVARRO AH: Hadii cammy ku hadasho Soomaali, waaxda luqadaha, qaybta kaalmada adeegyada, choco duarte. So [facility] [phone].    ATENCIÓN: Si habla español, tiene a alva disposición servicios gratuitos de asistencia lingüística. Lldenver al [phone].    We comply with applicable federal civil rights laws and [place] laws. We do not discriminate on the basis of race, color, national origin, age, disability, sex, sexual orientation, or gender identity.            Thank you!     Thank you for choosing [facility]  for your care. Our goal is always to provide you with excellent care. Hearing back from our patients is one way we can continue to improve our " services. Please take a few minutes to complete the written survey that you may receive in the mail after your visit with us. Thank you!             Your Updated Medication List - Protect others around you: Learn how to safely use, store and throw away your medicines at www.disposemymeds.org.          This list is accurate as of: 11/1/17  9:33 AM.  Always use your most recent med list.                   Brand Name Dispense Instructions for use Diagnosis    atorvastatin 80 MG tablet    LIPITOR    30 tablet    TAKE 1 TABLET (80 MG) BY MOUTH DAILY    Hyperlipidemia LDL goal <100       lisinopril 5 MG tablet    PRINIVIL/ZESTRIL    90 tablet    Take 1 tablet (5 mg) by mouth daily    Essential hypertension       MULTIVITAMIN ADULTS PO      Take 1 tablet by mouth daily        nitroGLYcerin 0.4 MG sublingual tablet    NITROSTAT    60 tablet    Place 1 tablet (0.4 mg) under the tongue every 5 minutes as needed up to 3 tablets per episode.        PROTONIX PO      Take 40 mg by mouth daily        * rivaroxaban ANTICOAGULANT 15 MG Tabs tablet    XARELTO    40 tablet    Take 1 tablet (15 mg) by mouth 2 times daily (with meals) for 20 days    Other acute pulmonary embolism without acute cor pulmonale (H)       * rivaroxaban ANTICOAGULANT 20 MG Tabs tablet   Start taking on:  11/15/2017    XARELTO    30 tablet    Take 1 tablet (20 mg) by mouth daily (with dinner)    Other acute pulmonary embolism without acute cor pulmonale (H)       VITAMIN D PO      Take 1 tablet by mouth every other day (OTC: Pt unsure of strength)        * Notice:  This list has 2 medication(s) that are the same as other medications prescribed for you. Read the directions carefully, and ask your doctor or other care provider to review them with you.

## 2017-11-01 NOTE — PROGRESS NOTES
SUBJECTIVE:   Oliver Baires is a 77 year old male who presents to clinic today for the following health issues:      Hospital Follow-up Visit:    Hospital/Nursing Home/IP Rehab Facility: [facility]  Date of Admission: 10/24/17  Date of Discharge: 10/25/17  Reason(s) for Admission: PE            Problems taking medications regularly:  None       Medication changes since discharge: None       Problems adhering to non-medication therapy:  None    Summary of hospitalization:  [facility] discharge summary reviewed  Diagnostic Tests/Treatments reviewed.  Follow up needed: none  Other Healthcare Providers Involved in Patient s Care:         None  Update since discharge: improved.     Post Discharge Medication Reconciliation: discharge medications reconciled, continue medications without change.  Plan of care communicated with patient     Coding guidelines for this visit:  Type of Medical   Decision Making Face-to-Face Visit       within 7 Days of discharge Face-to-Face Visit        within 14 days of discharge   Moderate Complexity [id number]   High Complexity [id number]     Since /C, feeling good  No chest pains, no dyspnea on exertion, no shortness of breath.   Reviewed his prior medical history.   Cancer screening up to date except specific colonoscopy.   Has had multiepl Abd CT scanas that have been negative for colonisc lesions.              Problem list and histories reviewed & adjusted, as indicated.  Additional history: as documented        Reviewed and updated as needed this visit by clinical staff  Tobacco  Allergies       Reviewed and updated as needed this visit by Provider           Past Medical History:  ---------------------------  Past Medical History:   Diagnosis Date     Arthritis     Left knee-L knee injury     AV block      Bradycardia     not on bb due to low HR     CAD (coronary artery disease)     Cath 6/2010- angioplasty & Xience 3.0x15mm stent to prox LAD; STEMI 10/2004-  3.5 x 33mm Cypher ALEJANDRA to distal RCA, angioplasty to VINCENT, Lcx25%, Lma 25%     ED (erectile dysfunction)      GERD (gastroesophageal reflux disease)      Hyperlipidemia      Hypertension      IBS (irritable bowel syndrome)      Impaired fasting glucose      MI (myocardial infarction)     STEMI 10/2004     Personal history of colonic polyps     Tubular adenomas excised 1999, 2002     Pneumonia 2004    pleural effusion     Skin cancer        Past Surgical History:  ---------------------------  Past Surgical History:   Procedure Laterality Date     HC COLONOSCOPY THRU STOMA, DIAGNOSTIC  1999    Single tubular adenoma excised     HC COLONOSCOPY THRU STOMA, DIAGNOSTIC  2002    Tubular adenoma, hepatic flexure.     HC REMOVAL GALLBLADDER       HEART CATH, ANGIOPLASTY  6/23/2010    Angioplasty & Xience 3.0x15mm stent to prox LAD     HEART CATH, ANGIOPLASTY  10/30/2004    3.5x33mm Cypher stent to distal RCA; angioplasty to VINCENT     SURGICAL HISTORY OF -   11/04    Angioplasty/stenting of RCA       Current Medications:  ---------------------------  Current Outpatient Prescriptions   Medication Sig Dispense Refill     Pantoprazole Sodium (PROTONIX PO) Take 40 mg by mouth daily       rivaroxaban ANTICOAGULANT (XARELTO) 15 MG TABS tablet Take 1 tablet (15 mg) by mouth 2 times daily (with meals) for 20 days 40 tablet 0     atorvastatin (LIPITOR) 80 MG tablet TAKE 1 TABLET (80 MG) BY MOUTH DAILY 30 tablet 0     Multiple Vitamins-Minerals (MULTIVITAMIN ADULTS PO) Take 1 tablet by mouth daily        lisinopril (PRINIVIL,ZESTRIL) 5 MG tablet Take 1 tablet (5 mg) by mouth daily 90 tablet 3     nitroglycerin (NITROSTAT) 0.4 MG SL tablet Place 1 tablet (0.4 mg) under the tongue every 5 minutes as needed up to 3 tablets per episode. 60 tablet 1     Cholecalciferol (VITAMIN D PO) Take 1 tablet by mouth every other day (OTC: Pt unsure of strength)       [START ON 11/15/2017] rivaroxaban ANTICOAGULANT (XARELTO) 20 MG TABS tablet Take 1 tablet  (20 mg) by mouth daily (with dinner) (Patient not taking: Reported on 2017) 30 tablet 1       Allergies:  -------------  Allergies   Allergen Reactions     No Known Drug Allergies        Social History:  -------------------  Social History     Social History     Marital status:      Spouse name: N/A     Number of children: 2     Years of education: N/A     Occupational History      Retired     Social History Main Topics     Smoking status: Former Smoker     Quit date: 1980     Smokeless tobacco: Never Used     Alcohol use 0.0 oz/week     0 Standard drinks or equivalent per week      Comment: 2/day     Drug use: No     Sexual activity: Yes     Other Topics Concern     Caffeine Concern No     3 cups in am     Weight Concern No     Special Diet No     Exercise Yes     1-2 x week     Seat Belt Yes     Social History Narrative       Family Medical History:  ------------------------------  Family History   Problem Relation Age of Onset     CANCER Father      Pancreatic CA,  age 82     Cardiovascular Mother       of ruptured AAA age 92     CANCER Brother      liver ca         ROS:  REVIEW OF SYSTEMS:    RESP: negative for cough, dyspnea, wheezing, hemoptysis  CV: negative for chest pain, palpitations, PND, KENNEDY, orthopnea; reports no changes in their ability to perform physical activity (from cardiovascular standpoint)  GI: negative for dysphagia, N/V, pain, melena, diarrhea and constipation  NEURO: negative for numbness/tingling, paralysis, incoordination, or focal weakness     OBJECTIVE:                                                    BP (!) 122/20  Pulse 94  Temp 97.5  F (36.4  C) (Oral)  Wt 204 lb 1.6 oz (92.6 kg)  SpO2 98%  BMI 26.2 kg/m2     GENERAL alert and no distress  EYES:  Normal sclera,conjunctiva, EOMI  HENT: oral and posterior pharynx without lesions or erythema, facies symmetric  NECK: Neck supple. No LAD, without thyroidmegaly or JVD., Carotids without bruits.  RESP: Clear  to ausculation bilaterally without wheezes or crackles. Normal BS in all fields.  CV: RRR normal S1S2 without murmurs, rubs or gallops. PMI normal  LYMPH: no cervical lymph adenopathy appreciated  MS: extremities- no gross deformities of the visible extremities noted, no edema  PSYCH: Alert and oriented times 3; speech- coherent  SKIN:  No obvious significant skin lesions on visible portions of face          ASSESSMENT/PLAN:                                                      (I26.99) Other acute pulmonary embolism without acute cor pulmonale (H)  (primary encounter diagnosis)  Comment: stable since hospital D/C.   On xarelto.   Hypercoagulable workup may be moot point given second episode.   Cancer screening is up to date, with the exception of due for colonoscopy, but would defer this for another 6 months.   CT abd pelvis showed no colonic lesions.   Plan: CBC with platelets differential, Comprehensive         metabolic panel, Lipid panel reflex to direct         LDL Fasting    *  Finish the Xarelto twice per day dosing in another 2 weeks, then change to Xarelto 20 mg once per day starting 11/15/17.      *  Your cancer screening is up to date, with the exception of needing another colonoscopy.     *  I would wait for the colonoscopy in 3-6 months.     *  I do not see any indication for an ENT cancer at this time.  If you develop significant episodes of bloody noses, then you should see the ENT doctors.     *  Monitor the irritation inside the left nostril, if it is still present in 3 months then have Dr. Henry evaluate and consider referral to ENT if needed.     *  Use saline nasal spray as needed to prevent bloody noses due to dry weather.     *  Reschedule your phsyical for 3-6 months.     *  Continue all medications at the same doses.  Contact your usual pharmacy if you need refills.     *  Return to see Dr. Henry in 3 months, sooner if needed.  Please get fasting labs done at the [facility] or  any other [facility] lab 1-2 days before this appointment.  If you get the labs done at another [facility], make arrangements with them directly.  The orders will be in place.  Eat nothing for at least 8 hours prior to having these labs drawn.  Call [phone] to schedule appointments at [facility].                 (I25.10) Coronary artery disease involving native coronary artery of native heart without angina pectoris  Comment: The patient does not report any signs or symptoms of angina or active cardiac ischemia.   They do not report any relative changes in their ability to perform physical activities over the past year.    We discussed aggressive secondary risk factor modification, including aggressive BP control (under 130/ideally), aggressive LDL lowering with statin (goal under 100 for sure/under 70 ideally), no smoking, diabetes prevention/management, no smoking, and use of either ASA or similar anti platelet agent if tolerated.     Plan: CBC with platelets differential, Comprehensive         metabolic panel, Lipid panel reflex to direct         LDL Fasting            (R04.0) Epistaxis  Comment: few episodes of bloody nose, recommended saline nasal spray  There is on area inside left nares that is chronically irritated, may consdier referral to ENT for evlauation and treatment of this area.   Plan:     (E78.5) Hyperlipidemia LDL goal <100  Comment: Discussed new guidelines recommending a statin cholesterol lowering medication for any patient with either diabetes and/or vascular disease, aiming for a LDL goal under 100 for sure, ideally under 70.    Reviewed statins and their side effects including muscle pain, muscle inflammation, GI upset.  Told the patient to stop the medication in question and to call if any side effects develop.   Recommended CoQ10 200-300 mg at the same time as taking the statin medication to help reduce the chance of muscle side effects from the statin.    Plan: CBC  with platelets differential, Comprehensive         metabolic panel, Lipid panel reflex to direct         LDL Fasting            (I10) Essential hypertension  Comment: This condition is currently controlled on the current medical regimen.  Continue current therapy.   Discussed hypertension in detail including JNC VIII guidelines for blood pressure goals.  Discussed indication for treatment and treatment options.  Discussed the importance for aggressive management of HTN to prevent vascular complications later.  Recommended lower fat, lower carbohydrate, and lower sodium (<2000 mg)diet.  Discussed required intervals for follow up on HTN, lab studies, and the need to aggresive management of other cardiac disease risk factors.  Recommened pt. follow their blood pressures outside the clinic to ensure that BPs are remaining within guidelines, and to contact me if the readings are not within guidelines so we can adjust treatment as needed.   Plan: CBC with platelets differential, Comprehensive         metabolic panel            (E78.5) Hyperlipidemia with target LDL less than 100  Comment: Discussed new guidelines recommending a statin cholesterol lowering medication for any patient with either diabetes and/or vascular disease, aiming for a LDL goal under 100 for sure, ideally under 70.    Reviewed statins and their side effects including muscle pain, muscle inflammation, GI upset.  Told the patient to stop the medication in question and to call if any side effects develop.   Recommended CoQ10 200-300 mg at the same time as taking the statin medication to help reduce the chance of muscle side effects from the statin.    Plan: Comprehensive metabolic panel, Lipid panel         reflex to direct LDL Fasting               See Patient Instructions    MAGDALENA DEMPSEY M.D., MD  [facility]

## 2017-11-01 NOTE — PATIENT INSTRUCTIONS
"*  Finish the Xarelto twice per day dosing in another 2 weeks, then change to Xarelto 20 mg once per day starting 11/15/17.      *  Your cancer screening is up to date, with the exception of needing another colonoscopy.     *  I would wait for the colonoscopy in 3-6 months.     *  I do not see any indication for an ENT cancer at this time.  If you develop significant episodes of bloody noses, then you should see the ENT doctors.     *  Monitor the irritation inside the left nostril, if it is still present in 3 months then have Dr. Henry evaluate and consider referral to ENT if needed.     *  Use saline nasal spray as needed to prevent bloody noses due to dry weather.     *  Reschedule your phsyical for 3-6 months.     *  Continue all medications at the same doses.  Contact your usual pharmacy if you need refills.     *  Return to see Dr. Henry in 3 months, sooner if needed.  Please get fasting labs done at the [facility] or any other [facility] lab 1-2 days before this appointment.  If you get the labs done at another [facility], make arrangements with them directly.  The orders will be in place.  Eat nothing for at least 8 hours prior to having these labs drawn.  Call [phone] to schedule appointments at [facility].       5 GOALS TO PREVENT VASCULAR DISEASE:     1.  Aggressive blood pressure control, under 130/80 ideally.  Using medications if needed.    Your blood pressure is under good control    BP Readings from Last 4 Encounters:   11/01/17 (!) 122/20   10/25/17 112/59   07/19/17 148/80   04/13/17 118/64       2.  Aggressive LDL cholesterol (\"bad cholesterol\") lowering as indicated.    Your goal is an LDL under 130 for sure, preferably under 100.  (If you have diabetes or previous vascular disease, the the LDL goals would be under 100 for sure, preferably under 70.)    New guidelines identify four high-risk groups who could benefit from statins:   *people with pre-existing " "heart disease, such as those who have had a heart attack;   *people ages 40 to 75 who have diabetes of any type  *patients ages 40 to 75 with at least a 7.5% risk of developing cardiovascular disease over the next decade, according to a formula described in the guidelines  *patients with the sort of super-high cholesterol that sometimes runs in families, as evidenced by an LDL of 190 milligrams per deciliter or higher    Your cholesterol levels are well controlled.    Recent Labs   Lab Test  08/08/16   1042  10/29/15   1034  09/12/14   1013   CHOL  170  153  145   HDL  56  50  70   LDL  97  89  59   TRIG  83  72  78   CHOLHDLRATIO   --   3.1  2.1       3.  Aggressive diabetic prevention, screening and/or management.      You do not have diabetes as of the most recent blood tests.     4.  No smoking    5.  Consider taking low dose aspirin (81 mg) tablet once per day over the age of 50, every day unless there is a specific reason that you cannot take aspirin (such as side effect, allergy, or you are on another \"blood thinner\").        --Based on your current cardiac risk factors, you should take Aspirin 81 mg once per day if you are over 50 years of age.         "

## 2017-11-01 NOTE — NURSING NOTE
"Chief Complaint   Patient presents with     Hospital F/U       Initial BP (!) 122/20  Pulse 94  Temp 97.5  F (36.4  C) (Oral)  Wt 204 lb 1.6 oz (92.6 kg)  SpO2 98%  BMI 26.2 kg/m2 Estimated body mass index is 26.2 kg/(m^2) as calculated from the following:    Height as of 10/24/17: 6' 2\" (1.88 m).    Weight as of this encounter: 204 lb 1.6 oz (92.6 kg).  Medication Reconciliation: complete   Nancy Rocha CMA      "

## 2017-11-06 DIAGNOSIS — I10 ESSENTIAL HYPERTENSION: ICD-10-CM

## 2017-11-07 RX ORDER — LISINOPRIL 5 MG/1
TABLET ORAL
Qty: 90 TABLET | Refills: 3 | Status: SHIPPED | OUTPATIENT
Start: 2017-11-07 | End: 2018-10-17

## 2017-11-21 DIAGNOSIS — E78.5 HYPERLIPIDEMIA LDL GOAL <100: ICD-10-CM

## 2017-11-21 RX ORDER — ATORVASTATIN CALCIUM 80 MG/1
TABLET, FILM COATED ORAL
Qty: 30 TABLET | Refills: 0 | Status: SHIPPED | OUTPATIENT
Start: 2017-11-21 | End: 2018-01-02

## 2017-11-21 NOTE — TELEPHONE ENCOUNTER
Routing refill request to provider for review/approval because:  Rina given x1 and patient did not follow up, please advise  Labs not current:  lipids

## 2017-11-26 ENCOUNTER — APPOINTMENT (OUTPATIENT)
Dept: CT IMAGING | Facility: CLINIC | Age: 77
End: 2017-11-26
Attending: EMERGENCY MEDICINE
Payer: COMMERCIAL

## 2017-11-26 ENCOUNTER — HOSPITAL ENCOUNTER (EMERGENCY)
Facility: CLINIC | Age: 77
Discharge: HOME | End: 2017-11-26
Attending: EMERGENCY MEDICINE | Admitting: EMERGENCY MEDICINE
Payer: COMMERCIAL

## 2017-11-26 VITALS
HEIGHT: 74 IN | DIASTOLIC BLOOD PRESSURE: 81 MMHG | OXYGEN SATURATION: 97 % | HEART RATE: 81 BPM | BODY MASS INDEX: 25.8 KG/M2 | SYSTOLIC BLOOD PRESSURE: 130 MMHG | TEMPERATURE: 98.6 F | RESPIRATION RATE: 13 BRPM | WEIGHT: 201 LBS

## 2017-11-26 DIAGNOSIS — R05.9 COUGH: ICD-10-CM

## 2017-11-26 DIAGNOSIS — I26.99 PULMONARY EMBOLISM ON LEFT (H): ICD-10-CM

## 2017-11-26 LAB
ANION GAP SERPL CALCULATED.3IONS-SCNC: 8 MMOL/L (ref 3–14)
BASOPHILS # BLD AUTO: 0 10E9/L (ref 0–0.2)
BASOPHILS NFR BLD AUTO: 0.2 %
BUN SERPL-MCNC: 15 MG/DL (ref 7–30)
CALCIUM SERPL-MCNC: 9 MG/DL (ref 8.5–10.1)
CHLORIDE SERPL-SCNC: 103 MMOL/L (ref 94–109)
CO2 SERPL-SCNC: 28 MMOL/L (ref 20–32)
CREAT BLD-MCNC: 0.7 MG/DL (ref 0.66–1.25)
CREAT SERPL-MCNC: 0.85 MG/DL (ref 0.66–1.25)
D DIMER PPP FEU-MCNC: <0.3 UG/ML FEU (ref 0–0.5)
DEPRECATED S PYO AG THROAT QL EIA: NORMAL
DIFFERENTIAL METHOD BLD: ABNORMAL
EOSINOPHIL # BLD AUTO: 0.2 10E9/L (ref 0–0.7)
EOSINOPHIL NFR BLD AUTO: 3.2 %
ERYTHROCYTE [DISTWIDTH] IN BLOOD BY AUTOMATED COUNT: 12.9 % (ref 10–15)
GFR SERPL CREATININE-BSD FRML MDRD: 88 ML/MIN/1.7M2
GFR SERPL CREATININE-BSD FRML MDRD: >90 ML/MIN/1.7M2
GLUCOSE SERPL-MCNC: 96 MG/DL (ref 70–99)
HCT VFR BLD AUTO: 40.9 % (ref 40–53)
HGB BLD-MCNC: 14.2 G/DL (ref 13.3–17.7)
IMM GRANULOCYTES # BLD: 0 10E9/L (ref 0–0.4)
IMM GRANULOCYTES NFR BLD: 0.2 %
INTERPRETATION ECG - MUSE: NORMAL
LYMPHOCYTES # BLD AUTO: 0.9 10E9/L (ref 0.8–5.3)
LYMPHOCYTES NFR BLD AUTO: 13.7 %
MCH RBC QN AUTO: 32.9 PG (ref 26.5–33)
MCHC RBC AUTO-ENTMCNC: 34.7 G/DL (ref 31.5–36.5)
MCV RBC AUTO: 95 FL (ref 78–100)
MONOCYTES # BLD AUTO: 1 10E9/L (ref 0–1.3)
MONOCYTES NFR BLD AUTO: 16.8 %
NEUTROPHILS # BLD AUTO: 4.1 10E9/L (ref 1.6–8.3)
NEUTROPHILS NFR BLD AUTO: 65.9 %
NRBC # BLD AUTO: 0 10*3/UL
NRBC BLD AUTO-RTO: 0 /100
NT-PROBNP SERPL-MCNC: 162 PG/ML (ref 0–1800)
PLATELET # BLD AUTO: 202 10E9/L (ref 150–450)
POTASSIUM SERPL-SCNC: 3.7 MMOL/L (ref 3.4–5.3)
RBC # BLD AUTO: 4.32 10E12/L (ref 4.4–5.9)
SODIUM SERPL-SCNC: 139 MMOL/L (ref 133–144)
SPECIMEN SOURCE: NORMAL
TROPONIN I SERPL-MCNC: <0.015 UG/L (ref 0–0.04)
WBC # BLD AUTO: 6.2 10E9/L (ref 4–11)

## 2017-11-26 PROCEDURE — 82565 ASSAY OF CREATININE: CPT

## 2017-11-26 PROCEDURE — 71260 CT THORAX DX C+: CPT

## 2017-11-26 PROCEDURE — 80048 BASIC METABOLIC PNL TOTAL CA: CPT | Performed by: EMERGENCY MEDICINE

## 2017-11-26 PROCEDURE — 25000132 ZZH RX MED GY IP 250 OP 250 PS 637: Performed by: EMERGENCY MEDICINE

## 2017-11-26 PROCEDURE — 93005 ELECTROCARDIOGRAM TRACING: CPT

## 2017-11-26 PROCEDURE — 87081 CULTURE SCREEN ONLY: CPT | Performed by: EMERGENCY MEDICINE

## 2017-11-26 PROCEDURE — 25000125 ZZHC RX 250: Performed by: EMERGENCY MEDICINE

## 2017-11-26 PROCEDURE — 85379 FIBRIN DEGRADATION QUANT: CPT | Performed by: EMERGENCY MEDICINE

## 2017-11-26 PROCEDURE — 84484 ASSAY OF TROPONIN QUANT: CPT | Performed by: EMERGENCY MEDICINE

## 2017-11-26 PROCEDURE — 87880 STREP A ASSAY W/OPTIC: CPT | Performed by: EMERGENCY MEDICINE

## 2017-11-26 PROCEDURE — 83880 ASSAY OF NATRIURETIC PEPTIDE: CPT | Performed by: EMERGENCY MEDICINE

## 2017-11-26 PROCEDURE — 99285 EMERGENCY DEPT VISIT HI MDM: CPT | Mod: 25

## 2017-11-26 PROCEDURE — 25000128 H RX IP 250 OP 636: Performed by: EMERGENCY MEDICINE

## 2017-11-26 PROCEDURE — 85025 COMPLETE CBC W/AUTO DIFF WBC: CPT | Performed by: EMERGENCY MEDICINE

## 2017-11-26 RX ORDER — IOPAMIDOL 755 MG/ML
73 INJECTION, SOLUTION INTRAVASCULAR ONCE
Status: COMPLETED | OUTPATIENT
Start: 2017-11-26 | End: 2017-11-26

## 2017-11-26 RX ORDER — BENZONATATE 200 MG/1
200 CAPSULE ORAL 3 TIMES DAILY PRN
Qty: 21 CAPSULE | Refills: 0 | Status: SHIPPED | OUTPATIENT
Start: 2017-11-26 | End: 2018-01-17

## 2017-11-26 RX ORDER — BENZONATATE 100 MG/1
200 CAPSULE ORAL ONCE
Status: COMPLETED | OUTPATIENT
Start: 2017-11-26 | End: 2017-11-26

## 2017-11-26 RX ADMIN — IOPAMIDOL 73 ML: 755 INJECTION, SOLUTION INTRAVENOUS at 22:46

## 2017-11-26 RX ADMIN — BENZONATATE 200 MG: 100 CAPSULE ORAL at 23:22

## 2017-11-26 RX ADMIN — SODIUM CHLORIDE 96 ML: 9 INJECTION, SOLUTION INTRAVENOUS at 22:46

## 2017-11-26 ASSESSMENT — ENCOUNTER SYMPTOMS
SHORTNESS OF BREATH: 0
SORE THROAT: 1
COUGH: 1
FEVER: 0

## 2017-11-26 NOTE — ED AVS SNAPSHOT
Emergency Department    [address] [facility] [address]    Phone:  [phone]    Fax:  [phone]                                       Oliver Baires   MRN: [id number]    Department:   Emergency Department   Date of Visit:  11/26/2017           After Visit Summary Signature Page     I have received my discharge instructions, and my questions have been answered. I have discussed any challenges I see with this plan with the nurse or doctor.    ..........................................................................................................................................  Patient/Patient Representative Signature      ..........................................................................................................................................  Patient Representative Print Name and Relationship to Patient    ..................................................               ................................................  Date                                            Time    ..........................................................................................................................................  Reviewed by Signature/Title    ...................................................              ..............................................  Date                                                            Time

## 2017-11-26 NOTE — ED AVS SNAPSHOT
Emergency Department    [address] [facility] [address]    Phone:  [phone]    Fax:  [phone]                                       Oliver Baires   MRN: [id number]    Department:   Emergency Department   Date of Visit:  11/26/2017           Patient Information     Date Of Birth          1940        Your diagnoses for this visit were:     Cough     Pulmonary embolism on left (H)        You were seen by Brice Smith MD.      Follow-up Information     Follow up with Renan Henry MD In 1 week.    Specialty:  Internal Medicine    Contact information:    [address] [place] [address]  [phone]          Follow up with  Emergency Department.    Specialty:  EMERGENCY MEDICINE    Why:  As needed, If symptoms worsen    Contact information:    [address] [facility] [id number] [phone]        Discharge Instructions         Pulmonary Embolism (PE)  A pulmonary embolus is most often due to a blood clot that develops in a deep vein of the leg (deep vein thrombosis). If that clot, breaks loose and travels to the lung, it is called a pulmonary embolism (PE). This can cut off the flow of blood in the lungs.  A blood clot in the lungs is a medical emergency and may cause death.   Healthcare providers use the term venous thromboembolism (VTE) to describe these 2 conditions: deep vein thrombosis and pulmonary embolism. They use the term VTE because the 2 conditions are very closely related. And, because their prevention and treatment are also closely related.      A pulmonary embolism occurs when a blood clot forms in a vein and travels to the lungs.   How is pulmonary embolism diagnosed?  Your healthcare provider examines you and asks about your symptoms and health history. You may also have one or more of the following:    Blood tests to check for blood clotting or other problems    Imaging tests to look for clots in the veins or  lung    Electrocardiography (ECG) to test how well the heart is working  How is pulmonary embolism treated?    Blood-thinning medicines (anticoagulants). These medicines thin the blood. They may be given as a pill, as an injection, or through a tube into a vein (intravenous or IV). Blood thinners help prevent more blood clots from forming. They also help to prevent an existing clot from getting larger.    Thrombolysis. Thrombolytic medicines are used to quickly dissolve a blood clot. A long, narrow tube (catheter) is used to deliver medicine directly to the clot. Thrombolytic medicines increase the risk of bleeding so they are used very carefully.    Inferior vena cava (IVC) filter surgery. The vena cava is the body s largest vein. It carries blood from the body to the heart. A small filter traps blood clots in the lower body and prevents them from traveling to the lungs. The filter is inserted into the vein through a catheter. The filter may be used if blood thinners cannot be taken or if they don't work.     Pulmonary embolectomy. This is a procedure to remove a blood clot in the lungs. It may be done with surgery or with a catheter inserted in the body. It may be done when other treatments aren't safe or don't work.  What are the long-term concerns?  With treatment, blood clots are usually dissolved or removed. Some treatments can even help prevent future clots. But having a PE can put you at risk for another life-threatening blood clot. So, you will likely need to take anticoagulants to help keep blood clots from forming again. You may need to take this medicine for months or years.  You may also need to make lifestyle changes. This may include getting more active and eating healthier. You may need to wear elastic (compression) stockings and and take breaks on long trips.  Call 911  Call 911 or get emergency help if you have symptoms of a blood clot that has traveled to the lungs. The symptoms include:    Chest  pain    Trouble breathing    Coughing (may cough up blood)    Fainting    Fast heartbeat    Sweating  Call 911 if you have heavy or uncontrolled bleeding.  When to call your healthcare provider  Call your healthcare provider if you have swelling or pain in your leg, arm, or other area. These are symptoms of a blood clot.  You may have bleeding if you take medicine to help prevent blood clots.  Call your healthcare provider if you have signs or symptoms of bleeding. This includes:    Blood in the urine    Bleeding with bowel movements    Bleeding from the nose, gums, a cut, or vagina   Date Last Reviewed: 2/1/2017 2000-2017 [employer]. [address] [place] [address]. All rights reserved. This information is not intended as a substitute for professional medical care. Always follow your healthcare professional's instructions.          Future Appointments        Provider Department Dept Phone Center    1/22/2018 4:15 PM Tommy Villarreal MD [facility] [phone] [facility]      24 Hour Appointment Hotline       To make an appointment at any [facility], call [phone] ([phone]). If you don't have a family doctor or clinic, we will help you find one. [facility] are conveniently located to serve the needs of you and your family.             Review of your medicines      START taking        Dose / Directions Last dose taken    benzonatate 200 MG capsule   Commonly known as:  TESSALON   Dose:  200 mg   Quantity:  21 capsule        Take 1 capsule (200 mg) by mouth 3 times daily as needed for cough   Refills:  0          Our records show that you are taking the medicines listed below. If these are incorrect, please call your family doctor or clinic.        Dose / Directions Last dose taken    atorvastatin 80 MG tablet   Commonly known as:  LIPITOR   Quantity:  30 tablet        TAKE 1 TABLET (80 MG) BY MOUTH DAILY **NEED LABS FOR  REFILLS**   Refills:  0        lisinopril 5 MG tablet   Commonly known as:  PRINIVIL/ZESTRIL   Quantity:  90 tablet        TAKE 1 TABLET (5 MG) BY MOUTH DAILY   Refills:  3        MULTIVITAMIN ADULTS PO   Dose:  1 tablet        Take 1 tablet by mouth daily   Refills:  0        nitroGLYcerin 0.4 MG sublingual tablet   Commonly known as:  NITROSTAT   Dose:  0.4 mg   Quantity:  60 tablet        Place 1 tablet (0.4 mg) under the tongue every 5 minutes as needed up to 3 tablets per episode.   Refills:  1        PROTONIX PO   Dose:  40 mg        Take 40 mg by mouth daily   Refills:  0        rivaroxaban ANTICOAGULANT 20 MG Tabs tablet   Commonly known as:  XARELTO   Dose:  20 mg   Quantity:  30 tablet        Take 1 tablet (20 mg) by mouth daily (with dinner)   Refills:  1        VITAMIN D PO   Dose:  1 tablet        Take 1 tablet by mouth every other day (OTC: Pt unsure of strength)   Refills:  0                Prescriptions were sent or printed at these locations (1 Prescription)                   Other Prescriptions                Printed at Department/Unit printer (1 of 1)         benzonatate (TESSALON) 200 MG capsule                Procedures and tests performed during your visit     Basic metabolic panel    Beta strep group A culture    CBC with platelets differential    CT Chest Pulmonary Embolism w Contrast    Creatinine POCT    D dimer quantitative    EKG 12-lead, tracing only    Nt probnp inpatient (BNP)    Peripheral IV: Standard    Rapid strep screen    Troponin I      Orders Needing Specimen Collection     None      Pending Results     Date and Time Order Name Status Description    11/26/2017 2231 Beta strep group A culture In process             Pending Culture Results     Date and Time Order Name Status Description    11/26/2017 2231 Beta strep group A culture In process             Pending Results Instructions     If you had any lab results that were not finalized at the time of your Discharge, you can call  the ED Lab Result RN at [phone]. You will be contacted by this team for any positive Lab results or changes in treatment. The nurses are available 7 days a week from 10A to 6:30P.  You can leave a message 24 hours per day and they will return your call.        Test Results From Your Hospital Stay        11/26/2017 11:06 PM      Narrative     CT CHEST PULMONARY EMBOLISM WITH CONTRAST 11/26/2017 10:50 PM    HISTORY:  Cough.      TECHNIQUE: 100  mL Isovue 370. Axial images with coronal  reconstructions. Radiation dose for this scan was reduced using  automated exposure control, adjustment of the mA and/or kV according  to patient size, or iterative reconstruction technique.    COMPARISON:  10/24/2017.    FINDINGS:  Scattered areas of linear scarring and mild ground-glass  interstitial changes at both lung bases. Pleural-based thickening in  the posteroinferior right lower lobe has decreased. No focal  infiltrates. Mild cardiomegaly. Minimal reflux into the inferior vena  cava suggests right-sided heart failure. Coronary artery  calcifications.    The pulmonary arteries are well opacified. There is a very tiny  residual pulmonary embolus within the left lower lobe pulmonary  artery, best seen on axial images 85 and 86. No new pulmonary emboli  identified.        Impression     IMPRESSION:    1. No infiltrates.  2. Tiny residual left lower lobe pulmonary embolus.  3. No focal infiltrates to explain the patient's cough.    ZACKARY GERONIMO MD         11/26/2017 10:56 PM      Component Results     Component Value Ref Range & Units Status    WBC 6.2 4.0 - 11.0 10e9/L Final    RBC Count 4.32 (L) 4.4 - 5.9 10e12/L Final    Hemoglobin 14.2 13.3 - 17.7 g/dL Final    Hematocrit 40.9 40.0 - 53.0 % Final    MCV 95 78 - 100 fl Final    MCH 32.9 26.5 - 33.0 pg Final    MCHC 34.7 31.5 - 36.5 g/dL Final    RDW 12.9 10.0 - 15.0 % Final    Platelet Count 202 150 - 450 10e9/L Final    Diff Method Automated Method  Final    %  Neutrophils 65.9 % Final    % Lymphocytes 13.7 % Final    % Monocytes 16.8 % Final    % Eosinophils 3.2 % Final    % Basophils 0.2 % Final    % Immature Granulocytes 0.2 % Final    Nucleated RBCs 0 0 /100 Final    Absolute Neutrophil 4.1 1.6 - 8.3 10e9/L Final    Absolute Lymphocytes 0.9 0.8 - 5.3 10e9/L Final    Absolute Monocytes 1.0 0.0 - 1.3 10e9/L Final    Absolute Eosinophils 0.2 0.0 - 0.7 10e9/L Final    Absolute Basophils 0.0 0.0 - 0.2 10e9/L Final    Abs Immature Granulocytes 0.0 0 - 0.4 10e9/L Final    Absolute Nucleated RBC 0.0  Final         11/26/2017 11:09 PM      Component Results     Component Value Ref Range & Units Status    Sodium 139 133 - 144 mmol/L Final    Potassium 3.7 3.4 - 5.3 mmol/L Final    Chloride 103 94 - 109 mmol/L Final    Carbon Dioxide 28 20 - 32 mmol/L Final    Anion Gap 8 3 - 14 mmol/L Final    Glucose 96 70 - 99 mg/dL Final    Urea Nitrogen 15 7 - 30 mg/dL Final    Creatinine 0.85 0.66 - 1.25 mg/dL Final    GFR Estimate 88 >60 mL/min/1.7m2 Final    Non  GFR Calc    GFR Estimate If Black >90 >60 mL/min/1.7m2 Final    African American GFR Calc    Calcium 9.0 8.5 - 10.1 mg/dL Final         11/26/2017 11:19 PM      Component Results     Component Value Ref Range & Units Status    D Dimer <0.3 0.0 - 0.50 ug/ml FEU Final    This D-dimer assay is intended for use in conjunction with a clinical pretest   probability assessment model to exclude pulmonary embolism (PE) and deep   venous thrombosis (DVT) in outpatients suspected of PE or DVT. The cut-off   value is 0.5 ug/mL FEU.           11/26/2017 11:14 PM      Component Results     Component Value Ref Range & Units Status    N-Terminal Pro BNP Inpatient 162 0 - 1800 pg/mL Final       Reference range shown and results flagged as abnormal are suggested inpatient   cut points for confirming diagnosis if CHF in an acute setting. Establishing a   baseline value for each individual patient is useful for follow-up. An    inpatient or emergency department NT-proPBNP <300 pg/mL effectively rules out   acute CHF, with 99% negative predictive value.  The outpatient non-acute reference range for ruling out CHF is:   0-125 pg/mL (age 18 to less than 75)   0-450 pg/mL (age 75 yrs and older)           11/26/2017 11:14 PM      Component Results     Component Value Ref Range & Units Status    Troponin I ES <0.015 0.000 - 0.045 ug/L Final    The 99th percentile for upper reference range is 0.045 ug/L.  Troponin values   in the range of 0.045 - 0.120 ug/L may be associated with risks of adverse   clinical events.           11/26/2017 10:35 PM      Component Results     Component Value Ref Range & Units Status    Creatinine 0.7 0.66 - 1.25 mg/dL Final    GFR Estimate >90 >60 mL/min/1.7m2 Final    GFR Estimate If Black >90 >60 mL/min/1.7m2 Final         11/26/2017 11:01 PM      Component Results     Component    Specimen Description    Throat    Rapid Strep A Screen    NEGATIVE: No Group A streptococcal antigen detected by immunoassay, await culture report.         11/26/2017 11:02 PM                Clinical Quality Measure: Blood Pressure Screening     Your blood pressure was checked while you were in the emergency department today. The last reading we obtained was  BP: 138/90 . Please read the guidelines below about what these numbers mean and what you should do about them.  If your systolic blood pressure (the top number) is less than 120 and your diastolic blood pressure (the bottom number) is less than 80, then your blood pressure is normal. There is nothing more that you need to do about it.  If your systolic blood pressure (the top number) is 120-139 or your diastolic blood pressure (the bottom number) is 80-89, your blood pressure may be higher than it should be. You should have your blood pressure rechecked within a year by a primary care provider.  If your systolic blood pressure (the top number) is 140 or greater or your diastolic  "blood pressure (the bottom number) is 90 or greater, you may have high blood pressure. High blood pressure is treatable, but if left untreated over time it can put you at risk for heart attack, stroke, or kidney failure. You should have your blood pressure rechecked by a primary care provider within the next 4 weeks.  If your provider in the emergency department today gave you specific instructions to follow-up with your doctor or provider even sooner than that, you should follow that instruction and not wait for up to 4 weeks for your follow-up visit.        Thank you for choosing [facility]       Thank you for choosing [facility] for your care. Our goal is always to provide you with excellent care. Hearing back from our patients is one way we can continue to improve our services. Please take a few minutes to complete the written survey that you may receive in the mail after you visit with us. Thank you!        [employer] Information     [employer] lets you send messages to your doctor, view your test results, renew your prescriptions, schedule appointments and more. To sign up, go to [email or web address] . Click on \"Log in\" on the left side of the screen, which will take you to the Welcome page. Then click on \"Sign up Now\" on the right side of the page.     You will be asked to enter the access code listed below, as well as some personal information. Please follow the directions to create your username and password.     Your access code is: [email or web address]  Expires: 2018 10:50 AM     Your access code will  in 90 days. If you need help or a new code, please call your [facility] clinic or [phone].        Care EveryWhere ID     This is your Care EveryWhere ID. This could be used by other organizations to access your [facility] medical records  [id number]        Equal Access to Services     MASSIMO NAVARRO AH: johnathon Sparks, choco marshall " la'alek felicia. So [facility] [phone].    ATENCIÓN: Si habla español, tiene a alva disposición servicios gratuitos de asistencia lingüística. Llame al [phone].    We comply with applicable federal civil rights laws and [place] laws. We do not discriminate on the basis of race, color, national origin, age, disability, sex, sexual orientation, or gender identity.            After Visit Summary       This is your record. Keep this with you and show to your community pharmacist(s) and doctor(s) at your next visit.

## 2017-11-27 NOTE — DISCHARGE INSTRUCTIONS
Pulmonary Embolism (PE)  A pulmonary embolus is most often due to a blood clot that develops in a deep vein of the leg (deep vein thrombosis). If that clot, breaks loose and travels to the lung, it is called a pulmonary embolism (PE). This can cut off the flow of blood in the lungs.  A blood clot in the lungs is a medical emergency and may cause death.   Healthcare providers use the term venous thromboembolism (VTE) to describe these 2 conditions: deep vein thrombosis and pulmonary embolism. They use the term VTE because the 2 conditions are very closely related. And, because their prevention and treatment are also closely related.      A pulmonary embolism occurs when a blood clot forms in a vein and travels to the lungs.   How is pulmonary embolism diagnosed?  Your healthcare provider examines you and asks about your symptoms and health history. You may also have one or more of the following:    Blood tests to check for blood clotting or other problems    Imaging tests to look for clots in the veins or lung    Electrocardiography (ECG) to test how well the heart is working  How is pulmonary embolism treated?    Blood-thinning medicines (anticoagulants). These medicines thin the blood. They may be given as a pill, as an injection, or through a tube into a vein (intravenous or IV). Blood thinners help prevent more blood clots from forming. They also help to prevent an existing clot from getting larger.    Thrombolysis. Thrombolytic medicines are used to quickly dissolve a blood clot. A long, narrow tube (catheter) is used to deliver medicine directly to the clot. Thrombolytic medicines increase the risk of bleeding so they are used very carefully.    Inferior vena cava (IVC) filter surgery. The vena cava is the body s largest vein. It carries blood from the body to the heart. A small filter traps blood clots in the lower body and prevents them from traveling to the lungs. The filter is inserted into the vein  through a catheter. The filter may be used if blood thinners cannot be taken or if they don't work.     Pulmonary embolectomy. This is a procedure to remove a blood clot in the lungs. It may be done with surgery or with a catheter inserted in the body. It may be done when other treatments aren't safe or don't work.  What are the long-term concerns?  With treatment, blood clots are usually dissolved or removed. Some treatments can even help prevent future clots. But having a PE can put you at risk for another life-threatening blood clot. So, you will likely need to take anticoagulants to help keep blood clots from forming again. You may need to take this medicine for months or years.  You may also need to make lifestyle changes. This may include getting more active and eating healthier. You may need to wear elastic (compression) stockings and and take breaks on long trips.  Call 911  Call 911 or get emergency help if you have symptoms of a blood clot that has traveled to the lungs. The symptoms include:    Chest pain    Trouble breathing    Coughing (may cough up blood)    Fainting    Fast heartbeat    Sweating  Call 911 if you have heavy or uncontrolled bleeding.  When to call your healthcare provider  Call your healthcare provider if you have swelling or pain in your leg, arm, or other area. These are symptoms of a blood clot.  You may have bleeding if you take medicine to help prevent blood clots.  Call your healthcare provider if you have signs or symptoms of bleeding. This includes:    Blood in the urine    Bleeding with bowel movements    Bleeding from the nose, gums, a cut, or vagina   Date Last Reviewed: 2/1/2017 2000-2017 [employer]. [address] [place] [address], [place] [address]. All rights reserved. This information is not intended as a substitute for professional medical care. Always follow your healthcare professional's instructions.

## 2017-11-27 NOTE — ED PROVIDER NOTES
History     Chief Complaint:  Cough     HPI   Oliver Baires is a 77 year old male with history of pulmonary embolism who presents to the emergency department today for evaluation of a cough. Per chart review the patient was admitted from 10/24 to 10/25 for a pulmonary embolism at which time the below hospital course was undertaken. The patient was then discharged home on Xarelto. The patient states since he was discharged on 10/25 he has been compliant on his Xarelto and doing well, with some minor residual pain from his PE. The patient states his current cough and sore throat began 6-7 days ago. The cough worsened and became productive over the past few days. He denies any blood in his cough. The patient denies any measured fevers, but does note he feels warm. He denies any current shortness of breath.       10/24/2017-10/25/2017 [facility] Course:  Hospital Course  Oilver Baires was admitted on 10/24/2017.  The following problems were addressed during his hospitalization:  Left-sided pulmonary embolism, recurrent and unprovoked  Pulmonary embolism severity index of 87 given age and male sex. Anticipate secondary to DVT, most likely right-sided given history of such. No clinical evidence of asymmetric swelling or tenderness. Patient was not hypoxic or tachycardic on evaluation, recommended to be admitted for observation given elevated severity index. He was started on Xarelto 15mg BID, which he tolerated well. He was monitored on telemetry without arrythmias, continued to have stable hemodynamics without hypoxia and felt safe to discharge home following morning. He was discharged with prescriptions for Xarelto 15mg BID x21d as well as 20mg daily to start on 11/15/17. His PTA daily Aspirin was discontinued. He will follow-up with his PCP regarding age-appropriate cancer screening, follow-up stress testing (as below), and management of his lifelong anticoagulation.  Coronary artery disease  Patient  follows with Dr. Villarreal of cardiology. Is due for routine follow-up stress test which patient has scheduled for mid to late November. Recommended rescheduling/delaying stress test for several months (minimum 8 weeks) as long as he remains asymptomatic given new diagnosis pulmonary embolism. Resume PTA atorvastatin and lisinopril at discharge.  Naren Florence PA-C    Allergies:  No Known Drug Allergies      Medications:    atorvastatin (LIPITOR) 80 MG tablet  lisinopril (PRINIVIL/ZESTRIL) 5 MG tablet  Pantoprazole Sodium (PROTONIX PO)  rivaroxaban ANTICOAGULANT (XARELTO) 20 MG TABS tablet  Multiple Vitamins-Minerals (MULTIVITAMIN ADULTS PO)  nitroglycerin (NITROSTAT) 0.4 MG SL tablet  Cholecalciferol (VITAMIN D PO)    Past Medical History:    Arthritis   AV block   Bradycardia   CAD (coronary artery disease)   ED (erectile dysfunction)   GERD (gastroesophageal reflux disease)   Hyperlipidemia   Hypertension   IBS (irritable bowel syndrome)   Impaired fasting glucose   MI (myocardial infarction)   Personal history of colonic polyps   Pneumonia   Skin cancer     Past Surgical History:    Colonoscopy through stoma x2   Cholecystectomy   Angioplasty x3    Family History:    Pancreatic cancer  Ruptured AAA     Social History:  The patient was accompanied to the ED by himself.  Smoking Status: Former smoker  Smokeless Tobacco: Never used   Alcohol Use: Yes    Marital Status:        Review of Systems   Constitutional: Negative for fever.   HENT: Positive for sore throat.    Respiratory: Positive for cough (non-bloody). Negative for shortness of breath.    All other systems reviewed and are negative.    Physical Exam     Patient Vitals for the past 24 hrs:   BP Temp Temp src Pulse Heart Rate Resp SpO2 Height Weight   11/26/17 2231 - - - - 66 17 98 % - -   11/26/17 2230 138/90 - - - 68 22 98 % - -   11/26/17 2200 141/85 - - - - - 98 % - -   11/26/17 2157 142/90 - - - - - - - -   11/26/17 2147 (!) 170/91 98.6  F (37  " C) Oral 81 - 18 98 % 1.88 m (6' 2\") 91.2 kg (201 lb)      Physical Exam  General: Alert, interactive in mild distress  Head:  Scalp is atraumatic  Eyes:  The pupils are equal, round, and reactive to light    EOM's intact    No scleral icterus  ENT:      Nose:  The external nose is normal  Ears:  External ears are normal  Mouth/Throat: The oropharynx is normal    Mucus membranes are moist.      Neck:  Normal range of motion.      There is no rigidity.    Trachea is in the midline         CV:  Regular rate and rhythm    No murmur   Resp:  Breath sounds are clear bilaterally    Non-labored, no retractions or accessory muscle use   MS:  Normal strength in all 4 extremities  Skin:  Warm and dry, No rash or lesions noted.  Psych:  Awake. Alert.  Normal affect.      Appropriate interactions.    Emergency Department Course     ECG:  Indication: Cough  Completed at 2212.  Read at 2223.   Sinus rhythm with 1st degree AV block. Left axis deviation. Abnormal ECG.   Rate 67 bpm. PA interval 234. QRS duration 90. QT/QTc 388/409. P-R-T axes 35 -35 26.     Imaging:  Radiology findings were communicated with the patient who voiced understanding of the findings.    CT Chest Pulmonary Embolism w Contrast:  IMPRESSION:    1. No infiltrates.  2. Tiny residual left lower lobe pulmonary embolus.  3. No focal infiltrates to explain the patient's cough.  Report per radiology      Laboratory:  Laboratory findings were communicated with the patient who voiced understanding of the findings.    Creatinine POCT: 0.7, GFR > 90    Rapid strep screen: Negative       CBC: WBC 6.2, HGB 14.2,   BMP: AWNL. (Creatinine 0.85)   D Dimer (Collected 2211): <0.3   Troponin (Collected 2211): <0.015   BNP: 162      Emergency Department Course:  Nursing notes and vitals reviewed.  2226 I performed an exam of the patient as documented above.   IV was inserted and blood was drawn for laboratory testing, results above.   The patient was sent for a chest CT " while in the emergency department, results above.    EKG obtained in the ED, see results above.    2316 I rechecked the patient and updated him on his CT and lab results.   I discussed the treatment plan with the patient. They expressed understanding of this plan and consented to discharge. They will be discharged home with instructions for care and follow up. In addition, the patient will return to the emergency department if their symptoms persist, worsen, if new symptoms arise or if there is any concern.  All questions were answered. I personally reviewed the laboratory and imaging results with the Patient and answered all related questions prior to discharge.   Impression & Plan      Medical Decision Making:  Oliver Baires was seen and evaluated. The above workup was undertaken. Previous records were reviewed. Given his recent pulmonary embolism CT imaging of the chest was performed. Thankfully demonstrating no new PE, no signs of pulmonary infarction, no signs of hemo or pneumothorax, no signs of pneumonia. I believe this cough was likely secondary to the small residual pulmonary embolism versus a viral illness. There are no signs of sepsis syndrome or acute coronary syndrome. Laboratory workup was all normal. I have prescribed Tessalon and recommended he continue his Xarelto and close follow up with his primary care doctor. He will return here if new symptoms develop.     Diagnosis:    ICD-10-CM    1. Cough R05         2. Pulmonary embolism on left (H) I26.99        Disposition:  Discharged to home with the below prescription.     Discharge Medications:  New Prescriptions    BENZONATATE (TESSALON) 200 MG CAPSULE    Take 1 capsule (200 mg) by mouth 3 times daily as needed for cough       Scribe Disclosure:  I, Paul Fierro, am serving as a scribe at 10:03 PM on 11/26/2017 to document services personally performed by Brice Smith MD based on my observations and the provider's statements to me.     11/26/2017    EMERGENCY DEPARTMENT       Trigger, Brice Plummer MD  11/27/17 0006

## 2017-11-29 LAB
BACTERIA SPEC CULT: NORMAL
SPECIMEN SOURCE: NORMAL

## 2017-12-28 DIAGNOSIS — E78.5 HYPERLIPIDEMIA LDL GOAL <100: ICD-10-CM

## 2017-12-28 LAB
CHOLEST SERPL-MCNC: 178 MG/DL
HDLC SERPL-MCNC: 61 MG/DL
LDLC SERPL CALC-MCNC: 98 MG/DL
NONHDLC SERPL-MCNC: 117 MG/DL
TRIGL SERPL-MCNC: 96 MG/DL

## 2017-12-28 PROCEDURE — 36415 COLL VENOUS BLD VENIPUNCTURE: CPT | Performed by: INTERNAL MEDICINE

## 2017-12-28 PROCEDURE — 80061 LIPID PANEL: CPT | Performed by: INTERNAL MEDICINE

## 2018-01-02 DIAGNOSIS — E78.5 HYPERLIPIDEMIA LDL GOAL <100: ICD-10-CM

## 2018-01-03 RX ORDER — ATORVASTATIN CALCIUM 80 MG/1
TABLET, FILM COATED ORAL
Qty: 30 TABLET | Refills: 8 | Status: SHIPPED | OUTPATIENT
Start: 2018-01-03 | End: 2018-08-31

## 2018-01-03 NOTE — TELEPHONE ENCOUNTER
Requested Prescriptions   Pending Prescriptions Disp Refills     atorvastatin (LIPITOR) 80 MG tablet [Pharmacy Med Name: ATORVASTATIN 80 MG TABLET]  NEEDS LABS FOR REFILLS.  Last Written Prescription Date:  11/21/17  Last Fill Quantity: 30 TABLET,  # refills: 0   Last Office Visit with ALEJANDRO, [facility] or [facility] prescribing provider:  11/1/17   Future Office Visit:    Next 5 appointments (look out 90 days)     Jan 22, 2018  4:15 PM CST   Return Visit with Tommy Villarreal MD   [facility] ([facility])    [address] [place] [id number] [phone]                  30 tablet 0     Sig: TAKE 1 TABLET (80 MG) BY MOUTH DAILY **NEED LABS FOR REFILLS**    Statins Protocol Passed    1/2/2018  2:07 PM       Passed - LDL on file in past 12 months    Recent Labs   Lab Test  12/28/17   0838   LDL  98            Passed - No abnormal creatine kinase in past 12 months    No lab results found.         Passed - Recent or future visit with authorizing provider    Patient had office visit in the last year or has a visit in the next 30 days with authorizing provider.  See chart review.              Passed - Patient is age 18 or older

## 2018-01-14 DIAGNOSIS — I26.99 OTHER ACUTE PULMONARY EMBOLISM WITHOUT ACUTE COR PULMONALE (H): ICD-10-CM

## 2018-01-14 NOTE — TELEPHONE ENCOUNTER
"Requested Prescriptions   Pending Prescriptions Disp Refills     XARELTO 20 MG TABS tablet [Pharmacy Med Name: XARELTO 20 MG TABLET]  Last Written Prescription Date:  11/15/17  Last Fill Quantity: 30 TABLET,  # refills: 1   Last Office Visit with [facility], [facility] or [facility] prescribing provider:  11/1/17   Future Office Visit:    Next 5 appointments (look out 90 days)     Jan 22, 2018  4:15 PM CST   Return Visit with Tommy Villarreal MD   [facility] ([facility])    [address] [place] [id number] [phone]                  30 tablet 1     Sig: TAKE 1 TABLET (20 MG) BY MOUTH DAILY (WITH DINNER)    Platelet Inhibitors Passed    1/14/2018  1:10 PM       Passed - Normal ALT on file in past 12 months    Recent Labs   Lab Test  10/24/17   2021   ALT  57            Passed - Normal HGB on file in past 12 months    Recent Labs   Lab Test  11/26/17 2211   HGB  14.2              Passed - Normal AST on file in past 12 months    Recent Labs   Lab Test  10/24/17   2021   AST  47*            Passed - Normal Platelets on file in past 12 months    Recent Labs   Lab Test  11/26/17   2211   PLT  202              Passed - Recent or future visit with authorizing provider's specialty    Patient had office visit in the last year or has a visit in the next 30 days with authorizing provider.  See \"Patient Info\" tab in inbasket, or \"Choose Columns\" in Meds & Orders section of the refill encounter.              Passed - Patient is age 18 or older       Passed - Normal serum creatinine on file in past 12 months    Recent Labs   Lab Test  11/26/17 2211   CR  0.85               "

## 2018-01-16 RX ORDER — RIVAROXABAN 20 MG/1
TABLET, FILM COATED ORAL
Qty: 30 TABLET | Refills: 9 | Status: SHIPPED | OUTPATIENT
Start: 2018-01-16 | End: 2018-10-17

## 2018-01-17 ENCOUNTER — OFFICE VISIT (OUTPATIENT)
Dept: INTERNAL MEDICINE | Facility: CLINIC | Age: 78
End: 2018-01-17
Payer: COMMERCIAL

## 2018-01-17 VITALS
SYSTOLIC BLOOD PRESSURE: 122 MMHG | RESPIRATION RATE: 16 BRPM | HEART RATE: 78 BPM | WEIGHT: 200 LBS | BODY MASS INDEX: 25.67 KG/M2 | DIASTOLIC BLOOD PRESSURE: 64 MMHG | HEIGHT: 74 IN | OXYGEN SATURATION: 97 %

## 2018-01-17 DIAGNOSIS — Z12.11 SPECIAL SCREENING FOR MALIGNANT NEOPLASMS, COLON: ICD-10-CM

## 2018-01-17 DIAGNOSIS — R10.13 DYSPEPSIA: Primary | ICD-10-CM

## 2018-01-17 LAB
ALBUMIN SERPL-MCNC: 3.5 G/DL (ref 3.4–5)
ALP SERPL-CCNC: 74 U/L (ref 40–150)
ALT SERPL W P-5'-P-CCNC: 45 U/L (ref 0–70)
ANION GAP SERPL CALCULATED.3IONS-SCNC: 5 MMOL/L (ref 3–14)
AST SERPL W P-5'-P-CCNC: 38 U/L (ref 0–45)
BILIRUB SERPL-MCNC: 0.6 MG/DL (ref 0.2–1.3)
BUN SERPL-MCNC: 16 MG/DL (ref 7–30)
CALCIUM SERPL-MCNC: 8.7 MG/DL (ref 8.5–10.1)
CHLORIDE SERPL-SCNC: 104 MMOL/L (ref 94–109)
CO2 SERPL-SCNC: 29 MMOL/L (ref 20–32)
CREAT SERPL-MCNC: 0.94 MG/DL (ref 0.66–1.25)
GFR SERPL CREATININE-BSD FRML MDRD: 77 ML/MIN/1.7M2
GLUCOSE SERPL-MCNC: 127 MG/DL (ref 70–99)
POTASSIUM SERPL-SCNC: 3.8 MMOL/L (ref 3.4–5.3)
PROT SERPL-MCNC: 7.2 G/DL (ref 6.8–8.8)
SODIUM SERPL-SCNC: 138 MMOL/L (ref 133–144)

## 2018-01-17 PROCEDURE — 80053 COMPREHEN METABOLIC PANEL: CPT | Performed by: INTERNAL MEDICINE

## 2018-01-17 PROCEDURE — 99214 OFFICE O/P EST MOD 30 MIN: CPT | Performed by: INTERNAL MEDICINE

## 2018-01-17 PROCEDURE — 36415 COLL VENOUS BLD VENIPUNCTURE: CPT | Performed by: INTERNAL MEDICINE

## 2018-01-17 PROCEDURE — 83690 ASSAY OF LIPASE: CPT | Performed by: INTERNAL MEDICINE

## 2018-01-17 NOTE — MR AVS SNAPSHOT
After Visit Summary   1/17/2018    Oliver Baires    MRN: [id number]           Patient Information     Date Of Birth          1940        Visit Information        Provider Department      1/17/2018 4:15 PM Renan Henry MD [facility]        Today's Diagnoses     Dyspepsia    -  1    Special screening for malignant neoplasms, colon          Care Instructions    - Increase the pantoprazole to 40 mg twice daily.    - Continue all other medications as you have been.      - I will contact you with lab results from today.     - [facility] will contact you to schedule your colonoscopy.  Please contact us if you do not hear from them.           Follow-ups after your visit        Additional Services     GASTROENTEROLOGY ADULT REF PROCEDURE ONLY       Last Lab Result: Creatinine (mg/dL)       Date                     Value                 11/26/2017               0.85             ----------  Body mass index is 25.68 kg/(m^2).     Needed:  No  Language:  English    Patient will be contacted to schedule procedure.     Please be aware that coverage of these services is subject to the terms and limitations of your health insurance plan.  Call member services at your health plan with any benefit or coverage questions.  Any procedures must be performed at a [facility] facility OR coordinated by your clinic's referral office.    Please bring the following with you to your appointment:    (1) Any X-Rays, CTs or MRIs which have been performed.  Contact the facility where they were done to arrange for  prior to your scheduled appointment.    (2) List of current medications   (3) This referral request   (4) Any documents/labs given to you for this referral                  Your next 10 appointments already scheduled     Jan 22, 2018  4:15 PM CST   Return Visit with Tommy Villarreal MD   [facility] ([facility])     "[address] [id number] [phone]              Who to contact     If you have questions or need follow up information about today's clinic visit or your schedule please contact [facility] directly at [phone].  Normal or non-critical lab and imaging results will be communicated to you by MyChart, letter or phone within 4 business days after the clinic has received the results. If you do not hear from us within 7 days, please contact the clinic through MyChart or phone. If you have a critical or abnormal lab result, we will notify you by phone as soon as possible.  Submit refill requests through [employer] or call your pharmacy and they will forward the refill request to us. Please allow 3 business days for your refill to be completed.          Additional Information About Your Visit        MyChart Information     [employer] lets you send messages to your doctor, view your test results, renew your prescriptions, schedule appointments and more. To sign up, go to [email or web address] . Click on \"Log in\" on the left side of the screen, which will take you to the Welcome page. Then click on \"Sign up Now\" on the right side of the page.     You will be asked to enter the access code listed below, as well as some personal information. Please follow the directions to create your username and password.     Your access code is: [email or web address]  Expires: 2018 10:50 AM     Your access code will  in 90 days. If you need help or a new code, please call your [facility] or [phone].        Care EveryWhere ID     This is your Care EveryWhere ID. This could be used by other organizations to access your [facility] medical records  [id number]        Your Vitals Were     Pulse Respirations Height Pulse Oximetry BMI (Body Mass Index)       78 16 6' 2\" (1.88 m) 97% 25.68 kg/m2        Blood Pressure from Last 3 Encounters:   18 122/64   17 130/81 "   11/01/17 (!) 122/20    Weight from Last 3 Encounters:   01/17/18 200 lb (90.7 kg)   11/26/17 201 lb (91.2 kg)   11/01/17 204 lb 1.6 oz (92.6 kg)              We Performed the Following     Comprehensive metabolic panel (BMP + Alb, Alk Phos, ALT, AST, Total. Bili, TP)     GASTROENTEROLOGY ADULT REF PROCEDURE ONLY     Lipase        Primary Care Provider Office Phone # Fax #    Renan Henry [phone] [address] [address]        Equal Access to Services     FATNA [facility] MississippiDAVIN : Hadii aad ku hadasho Soomaali, waaxda luqadaha, qaybta kaalmada adeegyada, waxay idiin hayaan adeeg glen campos . So [facility] [phone].    ATENCIÓN: Si habla español, tiene a alva disposición servicios gratuitos de asistencia lingüística. [facility] [phone].    We comply with applicable federal civil rights laws and [place] laws. We do not discriminate on the basis of race, color, national origin, age, disability, sex, sexual orientation, or gender identity.            Thank you!     Thank you for choosing [facility]  for your care. Our goal is always to provide you with excellent care. Hearing back from our patients is one way we can continue to improve our services. Please take a few minutes to complete the written survey that you may receive in the mail after your visit with us. Thank you!             Your Updated Medication List - Protect others around you: Learn how to safely use, store and throw away your medicines at www.disposemymeds.org.          This list is accurate as of: 1/17/18  4:34 PM.  Always use your most recent med list.                   Brand Name Dispense Instructions for use Diagnosis    atorvastatin 80 MG tablet    LIPITOR    30 tablet    TAKE 1 TABLET (80 MG) BY MOUTH DAILY **NEED LABS FOR REFILLS**    Hyperlipidemia LDL goal <100       lisinopril 5 MG tablet    PRINIVIL/ZESTRIL    90 tablet    TAKE 1 TABLET (5 MG) BY MOUTH DAILY    Essential  hypertension       MULTIVITAMIN ADULTS PO      Take 1 tablet by mouth daily        nitroGLYcerin 0.4 MG sublingual tablet    NITROSTAT    60 tablet    Place 1 tablet (0.4 mg) under the tongue every 5 minutes as needed up to 3 tablets per episode.        PROTONIX PO      Take 40 mg by mouth daily        VITAMIN D PO      Take 1 tablet by mouth every other day (OTC: Pt unsure of strength)        XARELTO 20 MG Tabs tablet   Generic drug:  rivaroxaban ANTICOAGULANT     30 tablet    TAKE 1 TABLET (20 MG) BY MOUTH DAILY (WITH DINNER)    Other acute pulmonary embolism without acute cor pulmonale (H)

## 2018-01-17 NOTE — PATIENT INSTRUCTIONS
- Increase the pantoprazole to 40 mg twice daily.    - Continue all other medications as you have been.      - I will contact you with lab results from today.     - [facility] will contact you to schedule your colonoscopy.  Please contact us if you do not hear from them.

## 2018-01-17 NOTE — LETTER
1/21/2018         Oliver Baires  [address] [facility] [address]            Dear Mr. Baires,    I am writing to inform you of the lab tests you had performed recently.      Your lab results are as follows:    Liver function: NORMAL  Kidney function: NORMAL  Pancreatic enzymes: NORMAL    Your lab testing all looked normal.    Thank you for allowing me to participate in your care.  If you have further questions, please contact us at [phone].      Sincerely,        MICKY Henry MD  Dept. of Internal Medicine  [facility]

## 2018-01-17 NOTE — PROGRESS NOTES
"  SUBJECTIVE:   Oliver Baires is a 77 year old male who presents to clinic today for the following health issues:    Abdominal Pain      Duration: 1 month    Description (location/character/radiation): stomach       Associated flank pain: None    Intensity:  mild    Accompanying signs and symptoms:        Fever/Chills: no        Gas/Bloating: no        Nausea/vomitting: YES- unsettled stomach       Diarrhea: no        Dysuria or Hematuria: no     History (previous similar pain/trauma/previous testing): none    Precipitating or alleviating factors:       Pain worse with eating/BM/urination: n o       Pain relieved by BM: no     Therapies tried and outcome: None    LMP:  not applicable          Problem list and histories reviewed & adjusted, as indicated.  Additional history:     1 month of \"increased acidity\" in his stomach.  Very very mild discomfort that is persistent all day, not obviously worse with eating or certain foods.  No vomiting, no hematemesis, no hematochezia or melena.  Denies any difficulty with swallowing.    Reviewed and updated as needed this visit by clinical staffTobacco       Reviewed and updated as needed this visit by Provider         ROS:  Constitutional, HEENT, cardiovascular, pulmonary, GI, , musculoskeletal, neuro, skin, endocrine and psych systems are negative, except as otherwise noted.    OBJECTIVE:     /64  Pulse 78  Resp 16  Ht 6' 2\" (1.88 m)  Wt 200 lb (90.7 kg)  SpO2 97%  BMI 25.68 kg/m2  Body mass index is 25.68 kg/(m^2).  GENERAL: healthy, alert and no distress  NECK: no adenopathy, no asymmetry, masses, or scars and thyroid normal to palpation  RESP: lungs clear to auscultation - no rales, rhonchi or wheezes  CV: regular rate and rhythm, normal S1 S2, no S3 or S4, no murmur, click or rub, no peripheral edema and peripheral pulses strong  ABDOMEN: soft, nontender, no hepatosplenomegaly, no masses and bowel sounds normal  MS: no gross musculoskeletal defects noted, no " edema        ASSESSMENT/PLAN:     1. Dyspepsia  Increase PPI, No need for EGD at this point.  - Comprehensive metabolic panel (BMP + Alb, Alk Phos, ALT, AST, Total. Bili, TP)  - Lipase    2. Special screening for malignant neoplasms, colon    - GASTROENTEROLOGY ADULT REF PROCEDURE ONLY        Renan Henry MD  [facility]

## 2018-01-18 LAB — LIPASE SERPL-CCNC: 186 U/L (ref 73–393)

## 2018-01-19 ENCOUNTER — TELEPHONE (OUTPATIENT)
Dept: INTERNAL MEDICINE | Facility: CLINIC | Age: 78
End: 2018-01-19

## 2018-01-19 DIAGNOSIS — I26.99 RECURRENT PULMONARY EMBOLISM (H): ICD-10-CM

## 2018-01-19 NOTE — TELEPHONE ENCOUNTER
[facility] is calling, looking to schedule colonoscopy for pt.  However, since pt is on XARELTO 20 MG TABS tablet, prior to scheduling colonoscopy, they need okay from PCP to hold RX for 3 days and bridging orders if PCP would like to do so.    Call [facility] back with MD sommers, and they will contact pt to schedule procedure.    Then call with orders and they will contact to to get procedure scheduled.

## 2018-01-21 PROBLEM — I26.99 RECURRENT PULMONARY EMBOLISM (H): Status: ACTIVE | Noted: 2017-04-13

## 2018-01-21 NOTE — TELEPHONE ENCOUNTER
Patient is on Xarelto for recurrent pulmonary embolism.  Initial PE suffered in 1/2017 after which he completed 6 months' of Xarelto.  Then had recurrent PE in late Oct.    Since it hasn't yet been 3 months since his most recent acute PE, I think we should delay this colonoscopy another 3 months.  At that time, Xarelto may be held for the 3 days as requested.  Please inform [facility], and patient.

## 2018-03-25 DIAGNOSIS — K21.9 GASTROESOPHAGEAL REFLUX DISEASE WITHOUT ESOPHAGITIS: Primary | ICD-10-CM

## 2018-03-27 RX ORDER — PANTOPRAZOLE SODIUM 40 MG/1
TABLET, DELAYED RELEASE ORAL
Qty: 90 TABLET | Refills: 0 | Status: SHIPPED | OUTPATIENT
Start: 2018-03-27 | End: 2018-09-26

## 2018-03-27 NOTE — TELEPHONE ENCOUNTER
"Requested Prescriptions   Pending Prescriptions Disp Refills     pantoprazole (PROTONIX) 40 MG EC tablet [Pharmacy Med Name: PANTOPRAZOLE SOD DR 40 MG TAB] 90 tablet 2     Sig: TAKE 1 TABLET (40 MG) BY MOUTH DAILY    PPI Protocol Passed    3/25/2018 12:16 PM       Passed - Not on Clopidogrel (unless Pantoprazole ordered)       Passed - No diagnosis of osteoporosis on record       Passed - Recent (12 mo) or future (30 days) visit within the authorizing provider's specialty    Patient had office visit in the last 12 months or has a visit in the next 30 days with authorizing provider or within the authorizing provider's specialty.  See \"Patient Info\" tab in inbasket, or \"Choose Columns\" in Meds & Orders section of the refill encounter.           Passed - Patient is age 18 or older        Routing refill request to provider for review/approval because:  Medication is reported/historical        "

## 2018-05-04 ENCOUNTER — OFFICE VISIT (OUTPATIENT)
Dept: DERMATOLOGY | Facility: CLINIC | Age: 78
End: 2018-05-04
Payer: COMMERCIAL

## 2018-05-04 ENCOUNTER — TELEPHONE (OUTPATIENT)
Dept: INTERNAL MEDICINE | Facility: CLINIC | Age: 78
End: 2018-05-04

## 2018-05-04 VITALS — SYSTOLIC BLOOD PRESSURE: 115 MMHG | DIASTOLIC BLOOD PRESSURE: 69 MMHG | OXYGEN SATURATION: 97 % | HEART RATE: 64 BPM

## 2018-05-04 DIAGNOSIS — D22.9 NEVUS: ICD-10-CM

## 2018-05-04 DIAGNOSIS — D48.5 NEOPLASM OF UNCERTAIN BEHAVIOR OF SKIN: Primary | ICD-10-CM

## 2018-05-04 DIAGNOSIS — L82.1 SEBORRHEIC KERATOSIS: ICD-10-CM

## 2018-05-04 DIAGNOSIS — L81.4 LENTIGO: ICD-10-CM

## 2018-05-04 DIAGNOSIS — L57.0 AK (ACTINIC KERATOSIS): ICD-10-CM

## 2018-05-04 DIAGNOSIS — D18.00 ANGIOMA: ICD-10-CM

## 2018-05-04 PROCEDURE — 17000 DESTRUCT PREMALG LESION: CPT | Mod: 51 | Performed by: PHYSICIAN ASSISTANT

## 2018-05-04 PROCEDURE — 88342 IMHCHEM/IMCYTCHM 1ST ANTB: CPT | Mod: TC | Performed by: PHYSICIAN ASSISTANT

## 2018-05-04 PROCEDURE — 88305 TISSUE EXAM BY PATHOLOGIST: CPT | Mod: TC | Performed by: PHYSICIAN ASSISTANT

## 2018-05-04 PROCEDURE — 11100 HC DESTRUCT PREMALIGNANT LESION, FIRST: CPT | Mod: 59 | Performed by: PHYSICIAN ASSISTANT

## 2018-05-04 PROCEDURE — 99214 OFFICE O/P EST MOD 30 MIN: CPT | Mod: 25 | Performed by: PHYSICIAN ASSISTANT

## 2018-05-04 NOTE — PROGRESS NOTES
HPI:   Oliver Baires is a 78 year old male who presents for Full skin cancer screening.  chief complaint  Last Skin Exam: none      1st Baseline: YES  Personal HX of Skin Cancer: no   Personal HX of Malignant Melanoma: no   Family HX of Skin Cancer / Malignant Melanoma: no  Personal HX of Atypical Moles:   no  Risk factors: sun exposure   New / Changing lesions: yes, left shoulder and left upper back   Social History: choe. Uses baby care product for sunscreen   On review of systems, there are no further skin complaints, patient is feeling otherwise well.  See patient intake sheet.  ROS of the following were done and are negative: Constitutional, Eyes, Ears, Nose,   Mouth, Throat, Cardiovascular, Respiratory, GI, Genitourinary, Musculoskeletal,   Psychiatric, Endocrine, Allergic/Immunologic.    This document serves as a record of the services and decisions personally performed and made by Michelle Major, MS, PA-C. It was created on her behalf by Claire Renee, a trained medical scribe. The creation of this document is based on the provider's statements to the medical scribe.  Claire Renee 12:00 PM May 4, 2018    PHYSICAL EXAM:   /69  Pulse 64  SpO2 97%  Skin exam performed as follows: Type 2 skin. Mood appropriate  Alert and Oriented X 3. Well developed, well nourished in no distress.  General appearance: Normal  Head including face: Normal  Eyes: conjunctiva and lids: Normal  Mouth: Lips, teeth, gums: Normal  Neck: Normal  Chest-breast/axillae: Normal  Back: Normal  Spleen and liver: Normal  Cardiovascular: Exam of peripheral vascular system by observation for swelling, varicosities, edema: Normal  Genitalia: groin, buttocks: Normal  Extremities: digits/nails (clubbing): Normal  Eccrine and Apocrine glands: Normal  Right upper extremity: Normal  Left upper extremity: Normal  Right lower extremity: Normal  Left lower extremity: Normal  Skin: Scalp and body hair: See below    Pt  deferred exam of breasts, groin, buttocks: No    Other physical findings:  1. Multiple pigmented macules on extremities and trunk  2. Multiple pigmented macules on face, trunk and extremities  3. Multiple vascular papules on trunk, arms and legs  4. Multiple scattered keratotic plaques  5. Pink, gritty papule on left forehead x 1  6. 11 mm pink plaque on right shin        Except as noted above, no other signs of skin cancer or melanoma.     ASSESSMENT/PLAN:   Benign Full skin cancer screening today. . Patient with history of none  Advised on monthly self exams and 1 year  Patient Education: Appropriate brochures given.    Multiple benign appearing nevi on arms, legs and trunk. Discussed ABCDEs of melanoma and sunscreen.   Multiple lentigos on arms, legs and trunk. Advised benign, no treatment needed.  Multiple scattered angiomas. Advised benign, no treatment needed.   Seborrheic keratosis on arms, legs and trunk. Advised benign, no treatment needed.  Actinic keratosis on left forehead x 1. As precancerous, cryosurgery performed. Advised on blistering and post-op care. Advised if not resolved in 1-2 months to return for evaluation  R/o SCC on right shin. Photo taken and put in chart. Shave bx in typical fashion .  Area cleaned with betadyne and anesthetized with 1% lidocaine with epi .  Dermablade used to remove the lesion and sent to pathology. Bleeding was cauterized. Pt tolerated procedure well.      Follow-up: pending path/yearly FSE/PRN sooner    1.) Patient was asked about new and changing moles. YES  2.) Patient received a complete physical skin examination: YES  3.) Patient was counseled to perform a monthly self skin examination: YES  Scribed By:Claire Renee, Medical Scribe    The information in this document, created by the medical scribe for me, accurately reflects the services I personally performed and the decisions made by me. I have reviewed and approved this document for accuracy prior  to leaving the patient care area.  May 4, 2018 12:12 PM    Michelle Major MS, PA-C

## 2018-05-04 NOTE — PATIENT INSTRUCTIONS
Wound Care Instructions     FOR SUPERFICIAL WOUNDS     [facility] [phone]                 AFTER 24 HOURS YOU SHOULD REMOVE THE BANDAGE AND BEGIN DAILY DRESSING CHANGES AS FOLLOWS:     1) Remove Dressing.     2) Clean and dry the area with tap water using a Q-tip or sterile gauze pad.     3) Apply Vaseline, Aquaphor, Polysporin ointment or Bacitracin ointment over entire wound.  Do NOT use Neosporin ointment.     4) Cover the wound with a band-aid, or a sterile non-stick gauze pad and micropore paper tape      REPEAT THESE INSTRUCTIONS AT LEAST ONCE A DAY UNTIL THE WOUND HAS COMPLETELY HEALED.    It is an old wives tale that a wound heals better when it is exposed to air and allowed to dry out. The wound will heal faster with a better cosmetic result if it is kept moist with ointment and covered with a bandage.    **Do not let the wound dry out.**      Supplies Needed:      *Cotton tipped applicators (Q-tips)    *Polysporin Ointment or Bacitracin Ointment (NOT NEOSPORIN)    *Band-aids or non-stick gauze pads and micropore paper tape.      PATIENT INFORMATION:    During the healing process you will notice a number of changes. All wounds develop a small halo of redness surrounding the wound.  This means healing is occurring. Severe itching with extensive redness usually indicates sensitivity to the ointment or bandage tape used to dress the wound.  You should call our office if this develops.      Swelling  and/or discoloration around your surgical site is common, particularly when performed around the eye.    All wounds normally drain.  The larger the wound the more drainage there will be.  After 7-10 days, you will notice the wound beginning to shrink and new skin will begin to grow.  The wound is healed when you can see skin has formed over the entire area.  A healed wound has a healthy, shiny look to the surface and is red to dark pink in color to normalize.  Wounds may take approximately 4-6  weeks to heal.  Larger wounds may take 6-8 weeks.  After the wound is healed you may discontinue dressing changes.    You may experience a sensation of tightness as your wound heals. This is normal and will gradually subside.    Your healed wound may be sensitive to temperature changes. This sensitivity improves with time, but if you re having a lot of discomfort, try to avoid temperature extremes.    Patients frequently experience itching after their wound appears to have healed because of the continue healing under the skin.  Plain Vaseline will help relieve the itching.        POSSIBLE COMPLICATIONS    BLEEDIN. Leave the bandage in place.  2. Use tightly rolled up gauze or a cloth to apply direct pressure over the bandage for 30  minutes.  3. Reapply pressure for an additional 30 minutes if necessary  4. Use additional gauze and tape to maintain pressure once the bleeding has stopped.

## 2018-05-04 NOTE — TELEPHONE ENCOUNTER
Reason for Call: Request for an order or referral:    Order or referral being requested: Hold order for Xarelto    Date needed: as soon as possible    Has the patient been seen by the PCP for this problem? NO    Additional comments: [facility] needs the hold order in order to schedule a colonoscopy.    Phone number Patient can be reached at:  Other phone number:  [phone]  Best Time:  anytime    Can we leave a detailed message on this number?  YES    Call taken on 5/4/2018 at 1:04 PM by ALEXIS MCCORD

## 2018-05-04 NOTE — LETTER
5/4/2018         RE: Oliver Baires  [address]  [facility] [address]        Dear Colleague,    Thank you for referring your patient, Oliver Baires, to the [facility]. Please see a copy of my visit note below.    HPI:   Oliver Baires is a 78 year old male who presents for Full skin cancer screening.  chief complaint  Last Skin Exam: none      1st Baseline: YES  Personal HX of Skin Cancer: no   Personal HX of Malignant Melanoma: no   Family HX of Skin Cancer / Malignant Melanoma: no  Personal HX of Atypical Moles:   no  Risk factors: sun exposure   New / Changing lesions: yes, left shoulder and left upper back   Social History: choe. Uses baby care product for sunscreen   On review of systems, there are no further skin complaints, patient is feeling otherwise well.  See patient intake sheet.  ROS of the following were done and are negative: Constitutional, Eyes, Ears, Nose,   Mouth, Throat, Cardiovascular, Respiratory, GI, Genitourinary, Musculoskeletal,   Psychiatric, Endocrine, Allergic/Immunologic.    This document serves as a record of the services and decisions personally performed and made by Michelle Major, MS, PA-C. It was created on her behalf by Claire Renee, a trained medical scribe. The creation of this document is based on the provider's statements to the medical scribe.  Claire Renee 12:00 PM May 4, 2018    PHYSICAL EXAM:   /69  Pulse 64  SpO2 97%  Skin exam performed as follows: Type 2 skin. Mood appropriate  Alert and Oriented X 3. Well developed, well nourished in no distress.  General appearance: Normal  Head including face: Normal  Eyes: conjunctiva and lids: Normal  Mouth: Lips, teeth, gums: Normal  Neck: Normal  Chest-breast/axillae: Normal  Back: Normal  Spleen and liver: Normal  Cardiovascular: Exam of peripheral vascular system by observation for swelling, varicosities, edema: Normal  Genitalia: groin, buttocks:  Normal  Extremities: digits/nails (clubbing): Normal  Eccrine and Apocrine glands: Normal  Right upper extremity: Normal  Left upper extremity: Normal  Right lower extremity: Normal  Left lower extremity: Normal  Skin: Scalp and body hair: See below    Pt deferred exam of breasts, groin, buttocks: No    Other physical findings:  1. Multiple pigmented macules on extremities and trunk  2. Multiple pigmented macules on face, trunk and extremities  3. Multiple vascular papules on trunk, arms and legs  4. Multiple scattered keratotic plaques  5. Pink, gritty papule on left forehead x 1  6. 11 mm pink plaque on right shin        Except as noted above, no other signs of skin cancer or melanoma.     ASSESSMENT/PLAN:   Benign Full skin cancer screening today. . Patient with history of none  Advised on monthly self exams and 1 year  Patient Education: Appropriate brochures given.    Multiple benign appearing nevi on arms, legs and trunk. Discussed ABCDEs of melanoma and sunscreen.   Multiple lentigos on arms, legs and trunk. Advised benign, no treatment needed.  Multiple scattered angiomas. Advised benign, no treatment needed.   Seborrheic keratosis on arms, legs and trunk. Advised benign, no treatment needed.  Actinic keratosis on left forehead x 1. As precancerous, cryosurgery performed. Advised on blistering and post-op care. Advised if not resolved in 1-2 months to return for evaluation  R/o SCC on right shin. Photo taken and put in chart. Shave bx in typical fashion .  Area cleaned with betadyne and anesthetized with 1% lidocaine with epi .  Dermablade used to remove the lesion and sent to pathology. Bleeding was cauterized. Pt tolerated procedure well.      Follow-up: pending path/yearly FSE/PRN sooner    1.) Patient was asked about new and changing moles. YES  2.) Patient received a complete physical skin examination: YES  3.) Patient was counseled to perform a monthly self skin examination: YES  Scribed By:Claire Nelson  Thelma Medical Scribe    The information in this document, created by the medical scribe for me, accurately reflects the services I personally performed and the decisions made by me. I have reviewed and approved this document for accuracy prior to leaving the patient care area.  May 4, 2018 12:12 PM    Michelle Major MS, P[place]AGAN      Again, thank you for allowing me to participate in the care of your patient.        Sincerely,        Michelle Major PA-C

## 2018-05-04 NOTE — MR AVS SNAPSHOT
After Visit Summary   5/4/2018    Oliver Baires    MRN: [id number]           Patient Information     Date Of Birth          1940        Visit Information        Provider Department      5/4/2018 11:45 AM Michelle Major PA-C [facility]        Today's Diagnoses     Neoplasm of uncertain behavior of skin    -  1    AK (actinic keratosis)        Angioma        Seborrheic keratosis        Nevus        Lentigo          Care Instructions      Wound Care Instructions     FOR SUPERFICIAL WOUNDS     [facility] [phone]                 AFTER 24 HOURS YOU SHOULD REMOVE THE BANDAGE AND BEGIN DAILY DRESSING CHANGES AS FOLLOWS:     1) Remove Dressing.     2) Clean and dry the area with tap water using a Q-tip or sterile gauze pad.     3) Apply Vaseline, Aquaphor, Polysporin ointment or Bacitracin ointment over entire wound.  Do NOT use Neosporin ointment.     4) Cover the wound with a band-aid, or a sterile non-stick gauze pad and micropore paper tape      REPEAT THESE INSTRUCTIONS AT LEAST ONCE A DAY UNTIL THE WOUND HAS COMPLETELY HEALED.    It is an old wives tale that a wound heals better when it is exposed to air and allowed to dry out. The wound will heal faster with a better cosmetic result if it is kept moist with ointment and covered with a bandage.    **Do not let the wound dry out.**      Supplies Needed:      *Cotton tipped applicators (Q-tips)    *Polysporin Ointment or Bacitracin Ointment (NOT NEOSPORIN)    *Band-aids or non-stick gauze pads and micropore paper tape.      PATIENT INFORMATION:    During the healing process you will notice a number of changes. All wounds develop a small halo of redness surrounding the wound.  This means healing is occurring. Severe itching with extensive redness usually indicates sensitivity to the ointment or bandage tape used to dress the wound.  You should call our office if this develops.      Swelling  and/or  discoloration around your surgical site is common, particularly when performed around the eye.    All wounds normally drain.  The larger the wound the more drainage there will be.  After 7-10 days, you will notice the wound beginning to shrink and new skin will begin to grow.  The wound is healed when you can see skin has formed over the entire area.  A healed wound has a healthy, shiny look to the surface and is red to dark pink in color to normalize.  Wounds may take approximately 4-6 weeks to heal.  Larger wounds may take 6-8 weeks.  After the wound is healed you may discontinue dressing changes.    You may experience a sensation of tightness as your wound heals. This is normal and will gradually subside.    Your healed wound may be sensitive to temperature changes. This sensitivity improves with time, but if you re having a lot of discomfort, try to avoid temperature extremes.    Patients frequently experience itching after their wound appears to have healed because of the continue healing under the skin.  Plain Vaseline will help relieve the itching.        POSSIBLE COMPLICATIONS    BLEEDIN. Leave the bandage in place.  2. Use tightly rolled up gauze or a cloth to apply direct pressure over the bandage for 30  minutes.  3. Reapply pressure for an additional 30 minutes if necessary  4. Use additional gauze and tape to maintain pressure once the bleeding has stopped.            Follow-ups after your visit        Your next 10 appointments already scheduled     2018  7:45 AM CDT   Return Visit with Tommy Villarreal MD   [facility] ([facility])    [address] [place] [id number] [phone] OPT 2              Who to contact     If you have questions or need follow up information about today's clinic visit or your schedule please contact [facility] directly at [phone].  Normal or non-critical lab and  "imaging results will be communicated to you by MyChart, letter or phone within 4 business days after the clinic has received the results. If you do not hear from us within 7 days, please contact the clinic through [email or web address] or phone. If you have a critical or abnormal lab result, we will notify you by phone as soon as possible.  Submit refill requests through [employer] or call your pharmacy and they will forward the refill request to us. Please allow 3 business days for your refill to be completed.          Additional Information About Your Visit        [email or web address] Information     [employer] lets you send messages to your doctor, view your test results, renew your prescriptions, schedule appointments and more. To sign up, go to [email or web address] . Click on \"Log in\" on the left side of the screen, which will take you to the Welcome page. Then click on \"Sign up Now\" on the right side of the page.     You will be asked to enter the access code listed below, as well as some personal information. Please follow the directions to create your username and password.     Your access code is: [email or web address]  Expires: 2018 12:19 PM     Your access code will  in 90 days. If you need help or a new code, please call your [place] clinic or [phone].        Care EveryWhere ID     This is your Care EveryWhere ID. This could be used by other organizations to access your [place] medical records  [id number]        Your Vitals Were     Pulse Pulse Oximetry                64 97%           Blood Pressure from Last 3 Encounters:   18 115/69   18 122/64   17 130/81    Weight from Last 3 Encounters:   18 90.7 kg (200 lb)   17 91.2 kg (201 lb)   17 92.6 kg (204 lb 1.6 oz)              Today, you had the following     No orders found for display       Primary Care Provider Office Phone # Fax #    Renan Henry [phone] [address] [address]      "   Equal Access to Services     [facility] : Hadii cammy shafer guera oHuali, wadominiqueda luqadaha, qaybta kaodineduardo lynch, choco duarte. So [facility] [phone].    ATENCIÓN: Si habla español, tiene a alva disposición servicios gratuitos de asistencia lingüística. Toaname al [phone].    We comply with applicable federal civil rights laws and [place] laws. We do not discriminate on the basis of race, color, national origin, age, disability, sex, sexual orientation, or gender identity.            Thank you!     Thank you for choosing [facility]  for your care. Our goal is always to provide you with excellent care. Hearing back from our patients is one way we can continue to improve our services. Please take a few minutes to complete the written survey that you may receive in the mail after your visit with us. Thank you!             Your Updated Medication List - Protect others around you: Learn how to safely use, store and throw away your medicines at www.disposemymeds.org.          This list is accurate as of 5/4/18 12:19 PM.  Always use your most recent med list.                   Brand Name Dispense Instructions for use Diagnosis    atorvastatin 80 MG tablet    LIPITOR    30 tablet    TAKE 1 TABLET (80 MG) BY MOUTH DAILY **NEED LABS FOR REFILLS**    Hyperlipidemia LDL goal <100       lisinopril 5 MG tablet    PRINIVIL/ZESTRIL    90 tablet    TAKE 1 TABLET (5 MG) BY MOUTH DAILY    Essential hypertension       MULTIVITAMIN ADULTS PO      Take 1 tablet by mouth daily        nitroGLYcerin 0.4 MG sublingual tablet    NITROSTAT    60 tablet    Place 1 tablet (0.4 mg) under the tongue every 5 minutes as needed up to 3 tablets per episode.        * PROTONIX PO      Take 40 mg by mouth daily        * pantoprazole 40 MG EC tablet    PROTONIX    90 tablet    TAKE 1 TABLET (40 MG) BY MOUTH DAILY    Gastroesophageal reflux disease without esophagitis       VITAMIN D PO       Take 1 tablet by mouth every other day (OTC: Pt unsure of strength)        XARELTO 20 MG Tabs tablet   Generic drug:  rivaroxaban ANTICOAGULANT     30 tablet    TAKE 1 TABLET (20 MG) BY MOUTH DAILY (WITH DINNER)    Other acute pulmonary embolism without acute cor pulmonale (H)       * Notice:  This list has 2 medication(s) that are the same as other medications prescribed for you. Read the directions carefully, and ask your doctor or other care provider to review them with you.

## 2018-05-10 LAB — COPATH REPORT: NORMAL

## 2018-05-11 ENCOUNTER — TELEPHONE (OUTPATIENT)
Dept: DERMATOLOGY | Facility: CLINIC | Age: 78
End: 2018-05-11

## 2018-05-11 NOTE — LETTER
[facility]  [address]  [address]  [phone]    5/11/2018       Oliver Baires  [address] [facility] [address]      Dear Oliver:    You are scheduled for Mohs Surgery on: 07/12/18 @7:00am.    Please check in at [facility], [address].     You don't need to arrive more than 5-10 minutes prior to your appointment time.     Be sure to eat a good breakfast and bathe and wash your hair prior to surgery.     If you are taking any anti-coagulants that are prescribed by your Doctor (such as Coumadin/Warfarin, Plavix, Aspirin, Ibuprofen), please continue taking them.     However, if you are taking anti-coagulants over the counter without a Doctor's order for a medical condition, please discontinue them 10 days prior to surgery.     Please wear loose comfortable clothing as it could possibly be 4-6 hours until your surgery is completed depending upon how many layers of tissue need to be removed.      Thank you,    MARILEE Linares MD

## 2018-05-11 NOTE — TELEPHONE ENCOUNTER
Called and spoke with patient regarding pathology results. Educated patient on diagnosis, Mohs, scheduled Mohs, and letter sent. Patient voiced understanding.    Rosana RN-BSN  [facility]  [phone]

## 2018-05-11 NOTE — TELEPHONE ENCOUNTER
Notes Recorded by Michelle Major PA-C on 5/10/2018 at 8:41 PM  Right shin SCC please schedule for mohs

## 2018-06-25 ENCOUNTER — OFFICE VISIT (OUTPATIENT)
Dept: CARDIOLOGY | Facility: CLINIC | Age: 78
End: 2018-06-25
Payer: COMMERCIAL

## 2018-06-25 VITALS
BODY MASS INDEX: 24.38 KG/M2 | HEIGHT: 74 IN | HEART RATE: 62 BPM | SYSTOLIC BLOOD PRESSURE: 111 MMHG | DIASTOLIC BLOOD PRESSURE: 72 MMHG | WEIGHT: 190 LBS

## 2018-06-25 DIAGNOSIS — I25.10 CORONARY ARTERY DISEASE INVOLVING NATIVE CORONARY ARTERY OF NATIVE HEART WITHOUT ANGINA PECTORIS: Primary | ICD-10-CM

## 2018-06-25 PROCEDURE — 99214 OFFICE O/P EST MOD 30 MIN: CPT | Performed by: INTERNAL MEDICINE

## 2018-06-25 NOTE — PROGRESS NOTES
HPI and Plan:   See dictation    Orders Placed This Encounter   Procedures     NM Exercise stress test (nuc card)     No orders of the defined types were placed in this encounter.    Medications Discontinued During This Encounter   Medication Reason     Pantoprazole Sodium (PROTONIX PO) Stopped by Patient         Encounter Diagnosis   Name Primary?     Coronary artery disease involving native coronary artery of native heart without angina pectoris Yes       CURRENT MEDICATIONS:  Current Outpatient Prescriptions   Medication Sig Dispense Refill     atorvastatin (LIPITOR) 80 MG tablet TAKE 1 TABLET (80 MG) BY MOUTH DAILY **NEED LABS FOR REFILLS** 30 tablet 8     Cholecalciferol (VITAMIN D PO) Take 1 tablet by mouth every other day (OTC: Pt unsure of strength)       lisinopril (PRINIVIL/ZESTRIL) 5 MG tablet TAKE 1 TABLET (5 MG) BY MOUTH DAILY 90 tablet 3     Multiple Vitamins-Minerals (MULTIVITAMIN ADULTS PO) Take 1 tablet by mouth daily        nitroglycerin (NITROSTAT) 0.4 MG SL tablet Place 1 tablet (0.4 mg) under the tongue every 5 minutes as needed up to 3 tablets per episode. 60 tablet 1     pantoprazole (PROTONIX) 40 MG EC tablet TAKE 1 TABLET (40 MG) BY MOUTH DAILY 90 tablet 0     XARELTO 20 MG TABS tablet TAKE 1 TABLET (20 MG) BY MOUTH DAILY (WITH DINNER) 30 tablet 9       ALLERGIES     Allergies   Allergen Reactions     No Known Drug Allergies        PAST MEDICAL HISTORY:  Past Medical History:   Diagnosis Date     Arthritis     Left knee-L knee injury     AV block      Bradycardia     not on bb due to low HR     CAD (coronary artery disease)     Cath 6/2010- angioplasty & Xience 3.0x15mm stent to prox LAD; STEMI 10/2004- 3.5 x 33mm Cypher ALEJANDRA to distal RCA, angioplasty to VINCENT, Lcx25%, Lma 25%     ED (erectile dysfunction)      GERD (gastroesophageal reflux disease)      Hyperlipidemia      Hypertension      IBS (irritable bowel syndrome)      Impaired fasting glucose      MI (myocardial infarction)     STEMI  10/2004     Personal history of colonic polyps     Tubular adenomas excised ,      Pneumonia 2004    pleural effusion     Pulmonary emboli (H) 2017    recurrent PE when off anticoag-now lifelong anticoag (unprovoked after car ride)- right leg dvt     Skin cancer        PAST SURGICAL HISTORY:  Past Surgical History:   Procedure Laterality Date     CHOLECYSTECTOMY       HC COLONOSCOPY THRU STOMA, DIAGNOSTIC      Single tubular adenoma excised     HC COLONOSCOPY THRU STOMA, DIAGNOSTIC      Tubular adenoma, hepatic flexure.     HC REMOVAL GALLBLADDER       HEART CATH, ANGIOPLASTY  2010    Angioplasty & Xience 3.0x15mm stent to prox LAD     HEART CATH, ANGIOPLASTY  10/30/2004    3.5x33mm Cypher stent to distal RCA; angioplasty to VINCENT     SURGICAL HISTORY OF -       Angioplasty/stenting of RCA       FAMILY HISTORY:  Family History   Problem Relation Age of Onset     Cancer Father      Pancreatic CA,  age 82     Cardiovascular Mother       of ruptured AAA age 92     Cancer Brother      liver ca       SOCIAL HISTORY:  Social History     Social History     Marital status:      Spouse name: N/A     Number of children: 2     Years of education: N/A     Occupational History      Retired     Social History Main Topics     Smoking status: Former Smoker     Quit date: 1980     Smokeless tobacco: Never Used     Alcohol use 0.0 oz/week     0 Standard drinks or equivalent per week      Comment: 2/day     Drug use: No     Sexual activity: Yes     Other Topics Concern     Caffeine Concern No     3 cups in am     Weight Concern No     Special Diet No     Exercise Yes     1-2 x week     Seat Belt Yes     Social History Narrative       Review of Systems:  Skin:  Negative     Eyes:  Negative    ENT:  Positive for hearing loss  Respiratory:  Negative    Cardiovascular:    Positive for;chest pain (descibes as muscle pain)  Gastroenterology: Positive for reflux  Genitourinary:  Positive for  "erectile dysfunction  Musculoskeletal:  Negative    Neurologic:  Negative    Psychiatric:  Negative    Heme/Lymph/Imm:  Negative allergies  Endocrine:  Negative      Physical Exam:  Vitals: /72  Pulse 62  Ht 1.88 m (6' 2\")  Wt 86.2 kg (190 lb)  BMI 24.39 kg/m2    Constitutional:  cooperative, alert and oriented, well developed, well nourished, in no acute distress        Skin:  warm and dry to the touch, no apparent skin lesions or masses noted          Head:  normocephalic, no masses or lesions        Eyes:  pupils equal and round, conjunctivae and lids unremarkable, sclera white, no xanthalasma, EOMS intact, no nystagmus        Lymph:No Cervical lymphadenopathy present     ENT:  no pallor or cyanosis, dentition good        Neck:  carotid pulses are full and equal bilaterally, JVP normal, no carotid bruit        Respiratory:  normal breath sounds, clear to auscultation, normal A-P diameter, normal symmetry, normal respiratory excursion, no use of accessory muscles         Cardiac: regular rhythm;normal S1 and S2       systolic ejection murmur;grade 1        pulses full and equal, no bruits auscultated                                   prior h/o L fem bruit, not heard today    GI:  abdomen soft, non-tender, BS normoactive, no mass, no HSM, no bruits surgical scars      Extremities and Muscular Skeletal:  no deformities, clubbing, cyanosis, erythema observed         trivial at most RLE edema ( prior dvt)    Neurological:  no gross motor deficits        Psych:  Alert and Oriented x 3      Recent Lab Results:  LIPID RESULTS:  Lab Results   Component Value Date    CHOL 178 12/28/2017    HDL 61 12/28/2017    LDL 98 12/28/2017    TRIG 96 12/28/2017    CHOLHDLRATIO 3.1 10/29/2015       LIVER ENZYME RESULTS:  Lab Results   Component Value Date    AST 38 01/17/2018    ALT 45 01/17/2018       CBC RESULTS:  Lab Results   Component Value Date    WBC 6.2 11/26/2017    RBC 4.32 (L) 11/26/2017    HGB 14.2 11/26/2017    " HCT 40.9 11/26/2017    MCV 95 11/26/2017    MCH 32.9 11/26/2017    MCHC 34.7 11/26/2017    RDW 12.9 11/26/2017     11/26/2017       BMP RESULTS:  Lab Results   Component Value Date     01/17/2018    POTASSIUM 3.8 01/17/2018    CHLORIDE 104 01/17/2018    CO2 29 01/17/2018    ANIONGAP 5 01/17/2018     (H) 01/17/2018    BUN 16 01/17/2018    CR 0.94 01/17/2018    GFRESTIMATED 77 01/17/2018    GFRESTBLACK >90 01/17/2018    DALIA 8.7 01/17/2018        A1C RESULTS:  No results found for: A1C    INR RESULTS:  Lab Results   Component Value Date    INR 0.96 10/24/2017    INR 0.98 06/23/2010           CC  Renan Henry MD  [address] [place] [id number]

## 2018-06-25 NOTE — PROGRESS NOTES
Service Date: 06/25/2018      HISTORY OF PRESENT ILLNESS:  I had the pleasure of following up on our mutual patient, Oliver Baires.  I last saw him about 2-1/2 years ago.  He had in 2004 an inferior infarction and we placed a stent to the right coronary artery and balloon angioplasty of a PDA.  In 2010, he had another acute coronary syndrome.  The right coronary artery was open.  He had a new LAD lesion which was stented.  His last stress test was 2014.  Our plan was to have him come back around now for another followup stress test because we do know that he has additional disease including some circumflex artery narrowing.  He developed 2 pulmonary emboli, presumably unprovoked from his right leg.  They had occurred during a prolonged car ride and apparently he was on Xarelto for a while and came off it and he had a recurrent one last fall.  Currently, he feels well.  He does note some fatigue.  It is hard to know if it is exertional fatigue but it is certainly not exertional chest pain.  An echocardiogram was done in the last half year or so that showed normal LVEF.  The right ventricle had a normal EF but mildly dilated right ventricle.  They were not able to estimate right heart pressures and he had mild aortic and mitral valve insufficiency.  Overall, I think he is doing well.  I do note that the LDL is creeping up.  It is up to 98.  He is going to be seeing you later this year for a lipid profile.  I think if the LDL goes much higher than it is now, I would probably add Zetia to his current maximum Lipitor 80 and I did discuss this with him but he said he wanted to talk to you about therapy.  I am going to have him come back in 6 months and I will do a nuclear stress test at that point for surveillance and I can weigh in again about cholesterol or any other issues that might come up at that time.        Today's visit was 25 minutes, greater than 50% counseling.      Tommy Villarreal MD       cc:   MICKY Henry,  MD    [facility]    [address] [address]         AARON STRANGE MD             D: 2018   T: 2018   MT: MODESTO      Name:     DIANA ZHAO   MRN:      [id number]        Account:      [id number]   :      1940           Service Date: 2018      Document: [id number]

## 2018-06-25 NOTE — LETTER
6/25/2018    Renan Henry MD  [address] [address]    RE: Oliver Baires       Dear Colleague,    I had the pleasure of seeing Oliver Baires in the [facility].    HPI and Plan:   See dictation    Orders Placed This Encounter   Procedures     NM Exercise stress test (nuc card)     No orders of the defined types were placed in this encounter.    Medications Discontinued During This Encounter   Medication Reason     Pantoprazole Sodium (PROTONIX PO) Stopped by Patient         Encounter Diagnosis   Name Primary?     Coronary artery disease involving native coronary artery of native heart without angina pectoris Yes       CURRENT MEDICATIONS:  Current Outpatient Prescriptions   Medication Sig Dispense Refill     atorvastatin (LIPITOR) 80 MG tablet TAKE 1 TABLET (80 MG) BY MOUTH DAILY **NEED LABS FOR REFILLS** 30 tablet 8     Cholecalciferol (VITAMIN D PO) Take 1 tablet by mouth every other day (OTC: Pt unsure of strength)       lisinopril (PRINIVIL/ZESTRIL) 5 MG tablet TAKE 1 TABLET (5 MG) BY MOUTH DAILY 90 tablet 3     Multiple Vitamins-Minerals (MULTIVITAMIN ADULTS PO) Take 1 tablet by mouth daily        nitroglycerin (NITROSTAT) 0.4 MG SL tablet Place 1 tablet (0.4 mg) under the tongue every 5 minutes as needed up to 3 tablets per episode. 60 tablet 1     pantoprazole (PROTONIX) 40 MG EC tablet TAKE 1 TABLET (40 MG) BY MOUTH DAILY 90 tablet 0     XARELTO 20 MG TABS tablet TAKE 1 TABLET (20 MG) BY MOUTH DAILY (WITH DINNER) 30 tablet 9       ALLERGIES     Allergies   Allergen Reactions     No Known Drug Allergies        PAST MEDICAL HISTORY:  Past Medical History:   Diagnosis Date     Arthritis     Left knee-L knee injury     AV block      Bradycardia     not on bb due to low HR     CAD (coronary artery disease)     Cath 6/2010- angioplasty & Xience 3.0x15mm stent to prox LAD; STEMI 10/2004- 3.5 x 33mm Cypher ALEJANDRA to distal RCA, angioplasty to VINCENT, Lcx25%, Lma 25%      ED (erectile dysfunction)      GERD (gastroesophageal reflux disease)      Hyperlipidemia      Hypertension      IBS (irritable bowel syndrome)      Impaired fasting glucose      MI (myocardial infarction)     STEMI 10/2004     Personal history of colonic polyps     Tubular adenomas excised ,      Pneumonia 2004    pleural effusion     Pulmonary emboli (H) 2017    recurrent PE when off anticoag-now lifelong anticoag (unprovoked after car ride)- right leg dvt     Skin cancer        PAST SURGICAL HISTORY:  Past Surgical History:   Procedure Laterality Date     CHOLECYSTECTOMY       HC COLONOSCOPY THRU STOMA, DIAGNOSTIC      Single tubular adenoma excised     HC COLONOSCOPY THRU STOMA, DIAGNOSTIC      Tubular adenoma, hepatic flexure.     HC REMOVAL GALLBLADDER       HEART CATH, ANGIOPLASTY  2010    Angioplasty & Xience 3.0x15mm stent to prox LAD     HEART CATH, ANGIOPLASTY  10/30/2004    3.5x33mm Cypher stent to distal RCA; angioplasty to VINCENT     SURGICAL HISTORY OF -       Angioplasty/stenting of RCA       FAMILY HISTORY:  Family History   Problem Relation Age of Onset     Cancer Father      Pancreatic CA,  age 82     Cardiovascular Mother       of ruptured AAA age 92     Cancer Brother      liver ca       SOCIAL HISTORY:  Social History     Social History     Marital status:      Spouse name: N/A     Number of children: 2     Years of education: N/A     Occupational History      Retired     Social History Main Topics     Smoking status: Former Smoker     Quit date: 1980     Smokeless tobacco: Never Used     Alcohol use 0.0 oz/week     0 Standard drinks or equivalent per week      Comment: 2/day     Drug use: No     Sexual activity: Yes     Other Topics Concern     Caffeine Concern No     3 cups in am     Weight Concern No     Special Diet No     Exercise Yes     1-2 x week     Seat Belt Yes     Social History Narrative       Review of Systems:  Skin:  Negative     Eyes:   "Negative    ENT:  Positive for hearing loss  Respiratory:  Negative    Cardiovascular:    Positive for;chest pain (descibes as muscle pain)  Gastroenterology: Positive for reflux  Genitourinary:  Positive for erectile dysfunction  Musculoskeletal:  Negative    Neurologic:  Negative    Psychiatric:  Negative    Heme/Lymph/Imm:  Negative allergies  Endocrine:  Negative      Physical Exam:  Vitals: /72  Pulse 62  Ht 1.88 m (6' 2\")  Wt 86.2 kg (190 lb)  BMI 24.39 kg/m2    Constitutional:  cooperative, alert and oriented, well developed, well nourished, in no acute distress        Skin:  warm and dry to the touch, no apparent skin lesions or masses noted          Head:  normocephalic, no masses or lesions        Eyes:  pupils equal and round, conjunctivae and lids unremarkable, sclera white, no xanthalasma, EOMS intact, no nystagmus        Lymph:No Cervical lymphadenopathy present     ENT:  no pallor or cyanosis, dentition good        Neck:  carotid pulses are full and equal bilaterally, JVP normal, no carotid bruit        Respiratory:  normal breath sounds, clear to auscultation, normal A-P diameter, normal symmetry, normal respiratory excursion, no use of accessory muscles         Cardiac: regular rhythm;normal S1 and S2       systolic ejection murmur;grade 1        pulses full and equal, no bruits auscultated                                   prior h/o L fem bruit, not heard today    GI:  abdomen soft, non-tender, BS normoactive, no mass, no HSM, no bruits surgical scars      Extremities and Muscular Skeletal:  no deformities, clubbing, cyanosis, erythema observed         trivial at most RLE edema ( prior dvt)    Neurological:  no gross motor deficits        Psych:  Alert and Oriented x 3      Recent Lab Results:  LIPID RESULTS:  Lab Results   Component Value Date    CHOL 178 12/28/2017    HDL 61 12/28/2017    LDL 98 12/28/2017    TRIG 96 12/28/2017    CHOLHDLRATIO 3.1 10/29/2015       LIVER ENZYME " RESULTS:  Lab Results   Component Value Date    AST 38 01/17/2018    ALT 45 01/17/2018       CBC RESULTS:  Lab Results   Component Value Date    WBC 6.2 11/26/2017    RBC 4.32 (L) 11/26/2017    HGB 14.2 11/26/2017    HCT 40.9 11/26/2017    MCV 95 11/26/2017    MCH 32.9 11/26/2017    MCHC 34.7 11/26/2017    RDW 12.9 11/26/2017     11/26/2017       BMP RESULTS:  Lab Results   Component Value Date     01/17/2018    POTASSIUM 3.8 01/17/2018    CHLORIDE 104 01/17/2018    CO2 29 01/17/2018    ANIONGAP 5 01/17/2018     (H) 01/17/2018    BUN 16 01/17/2018    CR 0.94 01/17/2018    GFRESTIMATED 77 01/17/2018    GFRESTBLACK >90 01/17/2018    DALIA 8.7 01/17/2018        A1C RESULTS:  No results found for: A1C    INR RESULTS:  Lab Results   Component Value Date    INR 0.96 10/24/2017    INR 0.98 06/23/2010           CC  Renan Henry MD  [facility] [address] [place], [place] [id number]    Thank you for allowing me to participate in the care of your patient.      Sincerely,     Tommy Villarreal MD     [facility]    cc:   Renan Henry MD  [facility] [address] [place] [id number]

## 2018-06-25 NOTE — LETTER
6/25/2018      Renan Henry MD  [address] [place] [id number]      RE: Oliver Baires       Dear Colleague,    I had the pleasure of seeing Oliver Baires in the [facility].    Service Date: 06/25/2018      HISTORY OF PRESENT ILLNESS:  I had the pleasure of following up on our mutual patient, Olievr Baires.  I last saw him about 2-1/2 years ago.  He had in 2004 an inferior infarction and we placed a stent to the right coronary artery and balloon angioplasty of a PDA.  In 2010, he had another acute coronary syndrome.  The right coronary artery was open.  He had a new LAD lesion which was stented.  His last stress test was 2014.  Our plan was to have him come back around now for another followup stress test because we do know that he has additional disease including some circumflex artery narrowing.  He developed 2 pulmonary emboli, presumably unprovoked from his right leg.  They had occurred during a prolonged car ride and apparently he was on Xarelto for a while and came off it and he had a recurrent one last fall.  Currently, he feels well.  He does note some fatigue.  It is hard to know if it is exertional fatigue but it is certainly not exertional chest pain.  An echocardiogram was done in the last half year or so that showed normal LVEF.  The right ventricle had a normal EF but mildly dilated right ventricle.  They were not able to estimate right heart pressures and he had mild aortic and mitral valve insufficiency.  Overall, I think he is doing well.  I do note that the LDL is creeping up.  It is up to 98.  He is going to be seeing you later this year for a lipid profile.  I think if the LDL goes much higher than it is now, I would probably add Zetia to his current maximum Lipitor 80 and I did discuss this with him but he said he wanted to talk to you about therapy.  I am going to have him come back in 6 months and I will do a nuclear stress test at that point for  surveillance and I can weigh in again about cholesterol or any other issues that might come up at that time.        Today's visit was 25 minutes, greater than 50% counseling.      Aaron Strange MD       cc:   MICKY Henry MD    [facility]    [address] [id number]         AARON STRANGE MD             D: 2018   T: 2018   MT: DW      Name:     DIANA ZHAO   MRN:      [id number]        Account:      [id number]   :      1940           Service Date: 2018      Document: [id number]         Outpatient Encounter Prescriptions as of 2018   Medication Sig Dispense Refill     atorvastatin (LIPITOR) 80 MG tablet TAKE 1 TABLET (80 MG) BY MOUTH DAILY **NEED LABS FOR REFILLS** 30 tablet 8     Cholecalciferol (VITAMIN D PO) Take 1 tablet by mouth every other day (OTC: Pt unsure of strength)       lisinopril (PRINIVIL/ZESTRIL) 5 MG tablet TAKE 1 TABLET (5 MG) BY MOUTH DAILY 90 tablet 3     Multiple Vitamins-Minerals (MULTIVITAMIN ADULTS PO) Take 1 tablet by mouth daily        nitroglycerin (NITROSTAT) 0.4 MG SL tablet Place 1 tablet (0.4 mg) under the tongue every 5 minutes as needed up to 3 tablets per episode. 60 tablet 1     pantoprazole (PROTONIX) 40 MG EC tablet TAKE 1 TABLET (40 MG) BY MOUTH DAILY 90 tablet 0     XARELTO 20 MG TABS tablet TAKE 1 TABLET (20 MG) BY MOUTH DAILY (WITH DINNER) 30 tablet 9     [DISCONTINUED] Pantoprazole Sodium (PROTONIX PO) Take 40 mg by mouth daily       No facility-administered encounter medications on file as of 2018.        Again, thank you for allowing me to participate in the care of your patient.      Sincerely,    Aaron Strange MD     [facility]

## 2018-06-25 NOTE — MR AVS SNAPSHOT
"              After Visit Summary   6/25/2018    Oliver Baires    MRN: [id number]           Patient Information     Date Of Birth          1940        Visit Information        Provider Department      6/25/2018 7:45 AM Tommy Villarreal MD [facility]        Today's Diagnoses     Coronary artery disease involving native coronary artery of native heart without angina pectoris    -  1       Follow-ups after your visit        Your next 10 appointments already scheduled     Jul 12, 2018  7:00 AM CDT   MOHS with Evelio Linares MD   [facility]    [address] [id number] [phone]              Future tests that were ordered for you today     Open Future Orders        Priority Expected Expires Ordered    NM Exercise stress test (nuc card) Routine 12/22/2018 6/25/2019 6/25/2018            Who to contact     If you have questions or need follow up information about today's clinic visit or your schedule please contact [facility] directly at [phone].  Normal or non-critical lab and imaging results will be communicated to you by [employer], letter or phone within 4 business days after the clinic has received the results. If you do not hear from us within 7 days, please contact the clinic through [employer] or phone. If you have a critical or abnormal lab result, we will notify you by phone as soon as possible.  Submit refill requests through [employer] or call your pharmacy and they will forward the refill request to us. Please allow 3 business days for your refill to be completed.          Additional Information About Your Visit        MyChart Information     [employer] lets you send messages to your doctor, view your test results, renew your prescriptions, schedule appointments and more. To sign up, go to [email or web address] . Click on \"Log in\" on " "the left side of the screen, which will take you to the Welcome page. Then click on \"Sign up Now\" on the right side of the page.     You will be asked to enter the access code listed below, as well as some personal information. Please follow the directions to create your username and password.     Your access code is: [email or web address]  Expires: 2018 12:19 PM     Your access code will  in 90 days. If you need help or a new code, please call your [place] clinic or [phone].        Care EveryWhere ID     This is your Care EveryWhere ID. This could be used by other organizations to access your [place] medical records  [id number]        Your Vitals Were     Pulse Height BMI (Body Mass Index)             62 1.88 m (6' 2\") 24.39 kg/m2          Blood Pressure from Last 3 Encounters:   18 111/72   18 115/69   18 122/64    Weight from Last 3 Encounters:   18 86.2 kg (190 lb)   18 90.7 kg (200 lb)   17 91.2 kg (201 lb)               Primary Care Provider Office Phone # Fax #    Renan Henry [phone]       [address] [place] [address]        Equal Access to Services     MASSIMO NAVARRO AH: Hadii cammy shafer hadasho Sojeovannyali, waaxda luqadaha, qaybta kaalmada adeegyada, choco duarte. So [facility] [phone].    ATENCIÓN: Si habla español, tiene a alva disposición servicios gratuitos de asistencia lingüística. Llame al [phone].    We comply with applicable federal civil rights laws and [place] laws. We do not discriminate on the basis of race, color, national origin, age, disability, sex, sexual orientation, or gender identity.            Thank you!     Thank you for choosing [facility]  for your care. Our goal is always to provide you with excellent care. Hearing back from our patients is one way we can continue to improve our services. Please take a few minutes to complete the written " survey that you may receive in the mail after your visit with us. Thank you!             Your Updated Medication List - Protect others around you: Learn how to safely use, store and throw away your medicines at www.disposemymeds.org.          This list is accurate as of 6/25/18  8:20 AM.  Always use your most recent med list.                   Brand Name Dispense Instructions for use Diagnosis    atorvastatin 80 MG tablet    LIPITOR    30 tablet    TAKE 1 TABLET (80 MG) BY MOUTH DAILY **NEED LABS FOR REFILLS**    Hyperlipidemia LDL goal <100       lisinopril 5 MG tablet    PRINIVIL/ZESTRIL    90 tablet    TAKE 1 TABLET (5 MG) BY MOUTH DAILY    Essential hypertension       MULTIVITAMIN ADULTS PO      Take 1 tablet by mouth daily        nitroGLYcerin 0.4 MG sublingual tablet    NITROSTAT    60 tablet    Place 1 tablet (0.4 mg) under the tongue every 5 minutes as needed up to 3 tablets per episode.        pantoprazole 40 MG EC tablet    PROTONIX    90 tablet    TAKE 1 TABLET (40 MG) BY MOUTH DAILY    Gastroesophageal reflux disease without esophagitis       VITAMIN D PO      Take 1 tablet by mouth every other day (OTC: Pt unsure of strength)        XARELTO 20 MG Tabs tablet   Generic drug:  rivaroxaban ANTICOAGULANT     30 tablet    TAKE 1 TABLET (20 MG) BY MOUTH DAILY (WITH DINNER)    Other acute pulmonary embolism without acute cor pulmonale (H)

## 2018-07-12 ENCOUNTER — OFFICE VISIT (OUTPATIENT)
Dept: DERMATOLOGY | Facility: CLINIC | Age: 78
End: 2018-07-12
Payer: COMMERCIAL

## 2018-07-12 VITALS — OXYGEN SATURATION: 96 % | SYSTOLIC BLOOD PRESSURE: 121 MMHG | DIASTOLIC BLOOD PRESSURE: 76 MMHG | HEART RATE: 59 BPM

## 2018-07-12 DIAGNOSIS — D04.71 SQUAMOUS CELL CARCINOMA IN SITU OF SKIN OF RIGHT LOWER LEG: Primary | ICD-10-CM

## 2018-07-12 PROCEDURE — 17313 MOHS 1 STAGE T/A/L: CPT | Performed by: DERMATOLOGY

## 2018-07-12 NOTE — LETTER
7/12/2018         RE: Oliver Baires  [address]  [place] [address]        Dear Colleague,    Thank you for referring your patient, Oliver Baires, to the [facility]. Please see a copy of my visit note below.    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   .  Patient states this has been present for a while.  Patient reports the following symptoms:  tender.  Patient reports the following previous treatments cyro.  Patient reports the following modifying factors none.  Associated symptoms: none.  Patient has no other skin complaints today.  Remainder of the HPI, Meds, PMH, Allergies, FH, and SH was reviewed in chart.      Past Medical History:   Diagnosis Date     Arthritis     Left knee-L knee injury     AV block      Bradycardia     not on bb due to low HR     CAD (coronary artery disease)     Cath 6/2010- angioplasty & Xience 3.0x15mm stent to prox LAD; STEMI 10/2004- 3.5 x 33mm Cypher ALEJANDRA to distal RCA, angioplasty to VINCENT, Lcx25%, Lma 25%     ED (erectile dysfunction)      GERD (gastroesophageal reflux disease)      Hyperlipidemia      Hypertension      IBS (irritable bowel syndrome)      Impaired fasting glucose      MI (myocardial infarction)     STEMI 10/2004     Personal history of colonic polyps     Tubular adenomas excised 1999, 2002     Pneumonia 2004    pleural effusion     Pulmonary emboli (H) 2017    recurrent PE when off anticoag-now lifelong anticoag (unprovoked after car ride)- right leg dvt     Skin cancer        Past Surgical History:   Procedure Laterality Date     CHOLECYSTECTOMY       HC COLONOSCOPY THRU STOMA, DIAGNOSTIC  1999    Single tubular adenoma excised     HC COLONOSCOPY THRU STOMA, DIAGNOSTIC  2002    Tubular adenoma, hepatic flexure.     HC REMOVAL GALLBLADDER       HEART CATH, ANGIOPLASTY  6/23/2010    Angioplasty & Xience 3.0x15mm stent to prox LAD     HEART CATH,  ANGIOPLASTY  10/30/2004    3.5x33mm Cypher stent to distal RCA; angioplasty to VINCENT     SURGICAL HISTORY OF -       Angioplasty/stenting of RCA        Family History   Problem Relation Age of Onset     Cancer Father      Pancreatic CA,  age 82     Cardiovascular Mother       of ruptured AAA age 92     Cancer Brother      liver ca       Social History     Social History     Marital status:      Spouse name: N/A     Number of children: 2     Years of education: N/A     Occupational History      Retired     Social History Main Topics     Smoking status: Former Smoker     Quit date: 1980     Smokeless tobacco: Never Used     Alcohol use 0.0 oz/week     0 Standard drinks or equivalent per week      Comment: 2/day     Drug use: No     Sexual activity: Yes     Other Topics Concern     Caffeine Concern No     3 cups in am     Weight Concern No     Special Diet No     Exercise Yes     1-2 x week     Seat Belt Yes     Social History Narrative       Outpatient Encounter Prescriptions as of 2018   Medication Sig Dispense Refill     atorvastatin (LIPITOR) 80 MG tablet TAKE 1 TABLET (80 MG) BY MOUTH DAILY **NEED LABS FOR REFILLS** 30 tablet 8     Cholecalciferol (VITAMIN D PO) Take 1 tablet by mouth every other day (OTC: Pt unsure of strength)       lisinopril (PRINIVIL/ZESTRIL) 5 MG tablet TAKE 1 TABLET (5 MG) BY MOUTH DAILY 90 tablet 3     Multiple Vitamins-Minerals (MULTIVITAMIN ADULTS PO) Take 1 tablet by mouth daily        nitroglycerin (NITROSTAT) 0.4 MG SL tablet Place 1 tablet (0.4 mg) under the tongue every 5 minutes as needed up to 3 tablets per episode. 60 tablet 1     pantoprazole (PROTONIX) 40 MG EC tablet TAKE 1 TABLET (40 MG) BY MOUTH DAILY 90 tablet 0     XARELTO 20 MG TABS tablet TAKE 1 TABLET (20 MG) BY MOUTH DAILY (WITH DINNER) 30 tablet 9     No facility-administered encounter medications on file as of 2018.              Review Of Systems  Skin: As above  Eyes:  negative  Ears/Nose/Throat: negative  Respiratory: No shortness of breath, dyspnea on exertion, cough, or hemoptysis  Cardiovascular: negative  Gastrointestinal: negative  Genitourinary: negative  Musculoskeletal: negative  Neurologic: negative  Psychiatric: negative  Hematologic/Lymphatic/Immunologic: negative  Endocrine: negative      O:   NAD, WDWN, Alert & Oriented, Mood & Affect wnl, Vitals stable   Here today alone   /76  Pulse 59  SpO2 96%   General appearance normal   Vitals stable   Alert, oriented and in no acute distress      Following lymph nodes palpated: popliteal no lad   r guerra 1.4cm red scaly plaque       Eyes: Conjunctivae/lids:Normal     ENT: Lips, buccal mucosa, tongue: normal    MSK:Normal    Cardiovascular: peripheral edema none    Pulm: Breathing Normal    Neuro/Psych: Orientation:Normal; Mood/Affect:Normal      A/P:  1. 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 Indication for Mohs was Size, Location and Ill-defined margins. Patient confirmed the site with Dr. Linares.  After anesthesia with LEC, the tumor was excised using standard Mohs technique in 1  1 stages(s).  CLEAR MARGINS OBTAINED and Final defect size was 1.9 cm.       REPAIR SECOND INTENT: We discussed the options for wound management in full with the patient including risks/benefits/possible outcomes. Decision made to allow the wound to heal by second intention. EBL minimal; complications none; wound care routine.  The patient was discharged in good condition and will return in one month or prn for wound evaluation.    BENIGN LESIONS DISCUSSED WITH PATIENT:  I discussed the specifics of tumor, prognosis, and genetics of benign lesions.  I explained that treatment of these lesions would be purely cosmetic and not medically neccessary.  I discussed with patient different removal options including excision,  cautery and /or laser.      Nature and genetics of benign skin lesions dicussed with patient.  Signs and Symptoms of skin cancer discussed with patient.  ABCDEs of melanoma reviewed with patient.  Patient encouraged to perform monthly skin exams.  UV precautions reviewed with patient.  Patient to follow up with Primary Care provider regarding elevated blood pressure.  Skin care regimen reviewed with patient: Eliminate harsh soaps, i.e. Dial, zest, irsih spring; Mild soaps such as Cetaphil or Dove sensitive skin, avoid hot or cold showers, aggressive use of emollients including vanicream, cetaphil or cerave discussed with patient.    Risks of non-melanoma skin cancer discussed with patient   Return to clinic 6 months      Again, thank you for allowing me to participate in the care of your patient.        Sincerely,        Evelio Linares MD

## 2018-07-12 NOTE — PROGRESS NOTES
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   .  Patient states this has been present for a while.  Patient reports the following symptoms:  tender.  Patient reports the following previous treatments cyro.  Patient reports the following modifying factors none.  Associated symptoms: none.  Patient has no other skin complaints today.  Remainder of the HPI, Meds, PMH, Allergies, FH, and SH was reviewed in chart.      Past Medical History:   Diagnosis Date     Arthritis     Left knee-L knee injury     AV block      Bradycardia     not on bb due to low HR     CAD (coronary artery disease)     Cath 2010- angioplasty & Xience 3.0x15mm stent to prox LAD; STEMI 10/2004- 3.5 x 33mm Cypher ALEJANDRA to distal RCA, angioplasty to VINCENT, Lcx25%, Lma 25%     ED (erectile dysfunction)      GERD (gastroesophageal reflux disease)      Hyperlipidemia      Hypertension      IBS (irritable bowel syndrome)      Impaired fasting glucose      MI (myocardial infarction)     STEMI 10/2004     Personal history of colonic polyps     Tubular adenomas excised ,      Pneumonia     pleural effusion     Pulmonary emboli (H) 2017    recurrent PE when off anticoag-now lifelong anticoag (unprovoked after car ride)- right leg dvt     Skin cancer        Past Surgical History:   Procedure Laterality Date     CHOLECYSTECTOMY       HC COLONOSCOPY THRU STOMA, DIAGNOSTIC      Single tubular adenoma excised     HC COLONOSCOPY THRU STOMA, DIAGNOSTIC      Tubular adenoma, hepatic flexure.     HC REMOVAL GALLBLADDER       HEART CATH, ANGIOPLASTY  2010    Angioplasty & Xience 3.0x15mm stent to prox LAD     HEART CATH, ANGIOPLASTY  10/30/2004    3.5x33mm Cypher stent to distal RCA; angioplasty to VINCENT     SURGICAL HISTORY OF -       Angioplasty/stenting of RCA        Family History   Problem Relation Age of Onset     Cancer Father      Pancreatic CA,  age 82      Cardiovascular Mother       of ruptured AAA age 92     Cancer Brother      liver ca       Social History     Social History     Marital status:      Spouse name: N/A     Number of children: 2     Years of education: N/A     Occupational History      Retired     Social History Main Topics     Smoking status: Former Smoker     Quit date: 1980     Smokeless tobacco: Never Used     Alcohol use 0.0 oz/week     0 Standard drinks or equivalent per week      Comment: 2/day     Drug use: No     Sexual activity: Yes     Other Topics Concern     Caffeine Concern No     3 cups in am     Weight Concern No     Special Diet No     Exercise Yes     1-2 x week     Seat Belt Yes     Social History Narrative       Outpatient Encounter Prescriptions as of 2018   Medication Sig Dispense Refill     atorvastatin (LIPITOR) 80 MG tablet TAKE 1 TABLET (80 MG) BY MOUTH DAILY **NEED LABS FOR REFILLS** 30 tablet 8     Cholecalciferol (VITAMIN D PO) Take 1 tablet by mouth every other day (OTC: Pt unsure of strength)       lisinopril (PRINIVIL/ZESTRIL) 5 MG tablet TAKE 1 TABLET (5 MG) BY MOUTH DAILY 90 tablet 3     Multiple Vitamins-Minerals (MULTIVITAMIN ADULTS PO) Take 1 tablet by mouth daily        nitroglycerin (NITROSTAT) 0.4 MG SL tablet Place 1 tablet (0.4 mg) under the tongue every 5 minutes as needed up to 3 tablets per episode. 60 tablet 1     pantoprazole (PROTONIX) 40 MG EC tablet TAKE 1 TABLET (40 MG) BY MOUTH DAILY 90 tablet 0     XARELTO 20 MG TABS tablet TAKE 1 TABLET (20 MG) BY MOUTH DAILY (WITH DINNER) 30 tablet 9     No facility-administered encounter medications on file as of 2018.              Review Of Systems  Skin: As above  Eyes: negative  Ears/Nose/Throat: negative  Respiratory: No shortness of breath, dyspnea on exertion, cough, or hemoptysis  Cardiovascular: negative  Gastrointestinal: negative  Genitourinary: negative  Musculoskeletal: negative  Neurologic: negative  Psychiatric:  negative  Hematologic/Lymphatic/Immunologic: negative  Endocrine: negative      O:   NAD, WDWN, Alert & Oriented, Mood & Affect wnl, Vitals stable   Here today alone   /76  Pulse 59  SpO2 96%   General appearance normal   Vitals stable   Alert, oriented and in no acute distress      Following lymph nodes palpated: popliteal no lad   r guerra 1.4cm red scaly plaque       Eyes: Conjunctivae/lids:Normal     ENT: Lips, buccal mucosa, tongue: normal    MSK:Normal    Cardiovascular: peripheral edema none    Pulm: Breathing Normal    Neuro/Psych: Orientation:Normal; Mood/Affect:Normal      A/P:  1. 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 Indication for Mohs was Size, Location and Ill-defined margins. Patient confirmed the site with Dr. Linares.  After anesthesia with LEC, the tumor was excised using standard Mohs technique in 1  1 stages(s).  CLEAR MARGINS OBTAINED and Final defect size was 1.9 cm.       REPAIR SECOND INTENT: We discussed the options for wound management in full with the patient including risks/benefits/possible outcomes. Decision made to allow the wound to heal by second intention. EBL minimal; complications none; wound care routine.  The patient was discharged in good condition and will return in one month or prn for wound evaluation.    BENIGN LESIONS DISCUSSED WITH PATIENT:  I discussed the specifics of tumor, prognosis, and genetics of benign lesions.  I explained that treatment of these lesions would be purely cosmetic and not medically neccessary.  I discussed with patient different removal options including excision, cautery and /or laser.      Nature and genetics of benign skin lesions dicussed with patient.  Signs and Symptoms of skin cancer discussed with patient.  ABCDEs of melanoma reviewed with patient.  Patient encouraged to perform monthly skin exams.  UV  precautions reviewed with patient.  Patient to follow up with Primary Care provider regarding elevated blood pressure.  Skin care regimen reviewed with patient: Eliminate harsh soaps, i.e. Dial, zest, irsih spring; Mild soaps such as Cetaphil or Dove sensitive skin, avoid hot or cold showers, aggressive use of emollients including vanicream, cetaphil or cerave discussed with patient.    Risks of non-melanoma skin cancer discussed with patient   Return to clinic 6 months

## 2018-07-12 NOTE — NURSING NOTE
"Initial /76  Pulse 59  SpO2 96% Estimated body mass index is 24.39 kg/(m^2) as calculated from the following:    Height as of 6/25/18: 1.88 m (6' 2\").    Weight as of 6/25/18: 86.2 kg (190 lb). .    "

## 2018-07-12 NOTE — MR AVS SNAPSHOT
After Visit Summary   2018    Oliver Baires    MRN: [id number]           Patient Information     Date Of Birth          1940        Visit Information        Provider Department      2018 7:00 AM Evelio Linares MD 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 No strenuous activity for 48 hours    ? Take Tylenol as needed for discomfort.                                                .         ? Do not drink alcoholic beverages for 48 hours.    ? Keep the pressure bandage in place for 24 hours. If the bandage becomes blood tinged or loose, reinforce it with gauze and tape.        (Refer to the reverse side of this page for management of bleeding).    ? Remove bandage in 24 hours and begin wound care as follows:     1. Clean area with tap water using a Q tip or gauze pad, (shower / bathe normally)  2. Dry wound with Q tip or gauze pad  3. Apply Aquaphor, Vaseline, Polysporin or Bacitracin Ointment with a Q tip    Do NOT use Neosporin Ointment *  4. Cover the wound with a band-aid or nonstick gauze pad and paper tape.  5. Repeat wound care once a day until wound is completely healed.    It is an old wives tale that a wound heals better when it is exposed to air and allowed to dry out. The wound will heal faster with a better cosmetic result if it is kept moist with ointment and covered with a bandage.  Do not let the wound dry out.      Supplies Needed:                Qtips or gauze pads                Polysporin or Bacitracin Ointment                Bandaids or nonstick gauze pads and paper tape    Wound care kits and brown paper tape are available for purchase at   the pharmacy.    BLEEDIN. Use tightly rolled up gauze or cloth to apply direct pressure over the bandage for 20   minutes.  2. Reapply pressure for an  additional 20 minutes if necessary  3. Call the office or go to the nearest emergency room if pressure fails to stop the bleeding.  4. Use additional gauze and tape to maintain pressure once the bleeding has stopped.  5. Begin wound care 24 hours after surgery as directed.                  WOUND HEALING    1. One week after surgery a pink / red halo will form around the outside of the wound.   This is new skin.  2. The center of the wound will appear yellowish white and produce some drainage.  3. The pink halo will slowly migrate in toward the center of the wound until the wound is covered with new shiny pink skin.  4. There will be no more drainage when the wound is completely healed.  5. It will take six months to one year for the redness to fade.  6. The scar may be itchy, tight and sensitive to extreme temperatures for a year after the surgery.  7. Massaging the area several times a day for several minutes after the wound is completely healed will help the scar soften and normalize faster. Begin massage only after healing is complete.      In case of emergency call: Dr Linares: [phone]     [facility]: [phone]    [facility]: [phone]            Follow-ups after your visit        Who to contact     If you have questions or need follow up information about today's clinic visit or your schedule please contact [facility] directly at [phone].  Normal or non-critical lab and imaging results will be communicated to you by MyChart, letter or phone within 4 business days after the clinic has received the results. If you do not hear from us within 7 days, please contact the clinic through MyChart or phone. If you have a critical or abnormal lab result, we will notify you by phone as soon as possible.  Submit refill requests through [employer] or call your pharmacy and they will forward the refill request to us. Please allow 3 business days for your refill to be  completed.          Additional Information About Your Visit        Care EveryWhere ID     This is your Care EveryWhere ID. This could be used by other organizations to access your [place] medical records  [id number]        Your Vitals Were     Pulse Pulse Oximetry                59 96%           Blood Pressure from Last 3 Encounters:   07/12/18 121/76   06/25/18 111/72   05/04/18 115/69    Weight from Last 3 Encounters:   06/25/18 86.2 kg (190 lb)   01/17/18 90.7 kg (200 lb)   11/26/17 91.2 kg (201 lb)              We Performed the Following     MOHS TRUNK/ARM/LEG 1ST STAGE UP T0 5 BLOCKS        Primary Care Provider Office Phone # Fax #    Renan Henry [phone]       [address] [address]        Equal Access to Services     MASSIMO NAVARRO : Hadii aad ku hadasho Soomaali, waaxda luqadaha, qaybta kaalmada adeegyada, choco campos . So [facility] [phone].    ATENCIÓN: Si habla español, tiene a alva disposición servicios gratuitos de asistencia lingüística. Burt al [phone].    We comply with applicable federal civil rights laws and [place] laws. We do not discriminate on the basis of race, color, national origin, age, disability, sex, sexual orientation, or gender identity.            Thank you!     Thank you for choosing [facility]  for your care. Our goal is always to provide you with excellent care. Hearing back from our patients is one way we can continue to improve our services. Please take a few minutes to complete the written survey that you may receive in the mail after your visit with us. Thank you!             Your Updated Medication List - Protect others around you: Learn how to safely use, store and throw away your medicines at www.disposemymeds.org.          This list is accurate as of 7/12/18  8:14 AM.  Always use your most recent med list.                   Brand Name Dispense Instructions for use  Diagnosis    atorvastatin 80 MG tablet    LIPITOR    30 tablet    TAKE 1 TABLET (80 MG) BY MOUTH DAILY **NEED LABS FOR REFILLS**    Hyperlipidemia LDL goal <100       lisinopril 5 MG tablet    PRINIVIL/ZESTRIL    90 tablet    TAKE 1 TABLET (5 MG) BY MOUTH DAILY    Essential hypertension       MULTIVITAMIN ADULTS PO      Take 1 tablet by mouth daily        nitroGLYcerin 0.4 MG sublingual tablet    NITROSTAT    60 tablet    Place 1 tablet (0.4 mg) under the tongue every 5 minutes as needed up to 3 tablets per episode.        pantoprazole 40 MG EC tablet    PROTONIX    90 tablet    TAKE 1 TABLET (40 MG) BY MOUTH DAILY    Gastroesophageal reflux disease without esophagitis       VITAMIN D PO      Take 1 tablet by mouth every other day (OTC: Pt unsure of strength)        XARELTO 20 MG Tabs tablet   Generic drug:  rivaroxaban ANTICOAGULANT     30 tablet    TAKE 1 TABLET (20 MG) BY MOUTH DAILY (WITH DINNER)    Other acute pulmonary embolism without acute cor pulmonale (H)

## 2018-07-12 NOTE — PATIENT INSTRUCTIONS
Open Wound Care     for ______________        ? No strenuous activity for 48 hours    ? Take Tylenol as needed for discomfort.                                                .         ? Do not drink alcoholic beverages for 48 hours.    ? Keep the pressure bandage in place for 24 hours. If the bandage becomes blood tinged or loose, reinforce it with gauze and tape.        (Refer to the reverse side of this page for management of bleeding).    ? Remove bandage in 24 hours and begin wound care as follows:     1. Clean area with tap water using a Q tip or gauze pad, (shower / bathe normally)  2. Dry wound with Q tip or gauze pad  3. Apply Aquaphor, Vaseline, Polysporin or Bacitracin Ointment with a Q tip    Do NOT use Neosporin Ointment *  4. Cover the wound with a band-aid or nonstick gauze pad and paper tape.  5. Repeat wound care once a day until wound is completely healed.    It is an old wives tale that a wound heals better when it is exposed to air and allowed to dry out. The wound will heal faster with a better cosmetic result if it is kept moist with ointment and covered with a bandage.  Do not let the wound dry out.      Supplies Needed:                Qtips or gauze pads                Polysporin or Bacitracin Ointment                Bandaids or nonstick gauze pads and paper tape    Wound care kits and brown paper tape are available for purchase at   the pharmacy.    BLEEDIN. Use tightly rolled up gauze or cloth to apply direct pressure over the bandage for 20   minutes.  2. Reapply pressure for an additional 20 minutes if necessary  3. Call the office or go to the nearest emergency room if pressure fails to stop the bleeding.  4. Use additional gauze and tape to maintain pressure once the bleeding has stopped.  5. Begin wound care 24 hours after surgery as directed.                  WOUND HEALING    1. One week after surgery a pink / red halo will form around the outside of the wound.   This is new  skin.  2. The center of the wound will appear yellowish white and produce some drainage.  3. The pink halo will slowly migrate in toward the center of the wound until the wound is covered with new shiny pink skin.  4. There will be no more drainage when the wound is completely healed.  5. It will take six months to one year for the redness to fade.  6. The scar may be itchy, tight and sensitive to extreme temperatures for a year after the surgery.  7. Massaging the area several times a day for several minutes after the wound is completely healed will help the scar soften and normalize faster. Begin massage only after healing is complete.      In case of emergency call: Dr Linares: [phone]     [facility]: [phone]    [facility]: [phone]

## 2018-07-16 NOTE — NURSING NOTE
Surgical Office Location:  [facility]  [address] [place] [id number]

## 2018-08-31 DIAGNOSIS — E78.5 HYPERLIPIDEMIA LDL GOAL <100: ICD-10-CM

## 2018-08-31 NOTE — TELEPHONE ENCOUNTER
"Requested Prescriptions   Pending Prescriptions Disp Refills     atorvastatin (LIPITOR) 80 MG tablet [Pharmacy Med Name: ATORVASTATIN 80 MG TABLET] 30 tablet 7    Last Written Prescription Date:  1/3/2018  Last Fill Quantity: 30,  # refills: 8   Last office visit: 1/17/2018 with prescribing provider:  1/17/2018   Future Office Visit:     Sig: TAKE 1 TABLET (80 MG) BY MOUTH DAILY **NEED LABS FOR REFILLS**    Statins Protocol Passed    8/31/2018  2:25 AM       Passed - LDL on file in past 12 months    Recent Labs   Lab Test  12/28/17   0838   LDL  98            Passed - No abnormal creatine kinase in past 12 months    No lab results found.            Passed - Recent (12 mo) or future (30 days) visit within the authorizing provider's specialty    Patient had office visit in the last 12 months or has a visit in the next 30 days with authorizing provider or within the authorizing provider's specialty.  See \"Patient Info\" tab in inbasket, or \"Choose Columns\" in Meds & Orders section of the refill encounter.           Passed - Patient is age 18 or older          "

## 2018-09-04 RX ORDER — ATORVASTATIN CALCIUM 80 MG/1
TABLET, FILM COATED ORAL
Qty: 30 TABLET | Refills: 2 | Status: SHIPPED | OUTPATIENT
Start: 2018-09-04 | End: 2018-10-17

## 2018-09-26 DIAGNOSIS — K21.9 GASTROESOPHAGEAL REFLUX DISEASE WITHOUT ESOPHAGITIS: ICD-10-CM

## 2018-09-26 RX ORDER — PANTOPRAZOLE SODIUM 40 MG/1
TABLET, DELAYED RELEASE ORAL
Qty: 90 TABLET | Refills: 0 | Status: SHIPPED | OUTPATIENT
Start: 2018-09-26 | End: 2018-10-17

## 2018-09-26 NOTE — TELEPHONE ENCOUNTER
"Requested Prescriptions   Pending Prescriptions Disp Refills     pantoprazole (PROTONIX) 40 MG EC tablet [Pharmacy Med Name: PANTOPRAZOLE SOD DR 40 MG TAB] 90 tablet 0     Sig: TAKE 1 TABLET (40 MG) BY MOUTH DAILY    PPI Protocol Passed    9/26/2018  4:26 AM       Passed - Not on Clopidogrel (unless Pantoprazole ordered)       Passed - No diagnosis of osteoporosis on record       Passed - Recent (12 mo) or future (30 days) visit within the authorizing provider's specialty    Patient had office visit in the last 12 months or has a visit in the next 30 days with authorizing provider or within the authorizing provider's specialty.  See \"Patient Info\" tab in inbasket, or \"Choose Columns\" in Meds & Orders section of the refill encounter.           Passed - Patient is age 18 or older        Last Written Prescription Date:  03/27/2018  Last Fill Quantity: 90,  # refills: 0   Last office visit: 1/17/2018 with prescribing provider:  Dr. Guadalupe   Future Office Visit:      "

## 2018-10-16 ENCOUNTER — TELEPHONE (OUTPATIENT)
Dept: INTERNAL MEDICINE | Facility: CLINIC | Age: 78
End: 2018-10-16

## 2018-10-16 NOTE — TELEPHONE ENCOUNTER
As we had advised them in May - this patient may hold his Xarelto for 3 days prior to colonoscopy, and he does not need bridging anticoagulation.

## 2018-10-16 NOTE — TELEPHONE ENCOUNTER
Reason for Call: Request for an order or referral:    Order or referral being requested: Hold order for Xarelto    Date needed: as soon as possible    Has the patient been seen by the PCP for this problem? NO    Additional comments:   Thomas Leger needs hold orders before scheduling a colonoscopy.    Phone number Patient can be reached at:  Other phone number:  [phone]  Best Time:  anytime    Can we leave a detailed message on this number?  YES    Call taken on 10/16/2018 at 11:09 AM by ALEXIS MCCORD

## 2018-10-17 ENCOUNTER — OFFICE VISIT (OUTPATIENT)
Dept: INTERNAL MEDICINE | Facility: CLINIC | Age: 78
End: 2018-10-17
Payer: COMMERCIAL

## 2018-10-17 VITALS
WEIGHT: 196.9 LBS | HEART RATE: 65 BPM | BODY MASS INDEX: 25.28 KG/M2 | TEMPERATURE: 97.6 F | OXYGEN SATURATION: 98 % | SYSTOLIC BLOOD PRESSURE: 115 MMHG | DIASTOLIC BLOOD PRESSURE: 70 MMHG | RESPIRATION RATE: 16 BRPM

## 2018-10-17 DIAGNOSIS — I25.10 CORONARY ARTERY DISEASE INVOLVING NATIVE CORONARY ARTERY OF NATIVE HEART WITHOUT ANGINA PECTORIS: ICD-10-CM

## 2018-10-17 DIAGNOSIS — E78.5 HYPERLIPIDEMIA LDL GOAL <100: ICD-10-CM

## 2018-10-17 DIAGNOSIS — K21.9 GASTROESOPHAGEAL REFLUX DISEASE WITHOUT ESOPHAGITIS: ICD-10-CM

## 2018-10-17 DIAGNOSIS — Z23 NEED FOR PROPHYLACTIC VACCINATION WITH TETANUS-DIPHTHERIA (TD): ICD-10-CM

## 2018-10-17 DIAGNOSIS — Z00.00 MEDICARE ANNUAL WELLNESS VISIT, SUBSEQUENT: Primary | ICD-10-CM

## 2018-10-17 DIAGNOSIS — I10 ESSENTIAL HYPERTENSION: ICD-10-CM

## 2018-10-17 DIAGNOSIS — I26.99 RECURRENT PULMONARY EMBOLISM (H): ICD-10-CM

## 2018-10-17 LAB
ALBUMIN SERPL-MCNC: 3.4 G/DL (ref 3.4–5)
ALP SERPL-CCNC: 91 U/L (ref 40–150)
ALT SERPL W P-5'-P-CCNC: 41 U/L (ref 0–70)
ANION GAP SERPL CALCULATED.3IONS-SCNC: 5 MMOL/L (ref 3–14)
AST SERPL W P-5'-P-CCNC: 32 U/L (ref 0–45)
BILIRUB SERPL-MCNC: 0.8 MG/DL (ref 0.2–1.3)
BUN SERPL-MCNC: 15 MG/DL (ref 7–30)
CALCIUM SERPL-MCNC: 9.1 MG/DL (ref 8.5–10.1)
CHLORIDE SERPL-SCNC: 105 MMOL/L (ref 94–109)
CHOLEST SERPL-MCNC: 139 MG/DL
CO2 SERPL-SCNC: 29 MMOL/L (ref 20–32)
CREAT SERPL-MCNC: 0.89 MG/DL (ref 0.66–1.25)
GFR SERPL CREATININE-BSD FRML MDRD: 83 ML/MIN/1.7M2
GLUCOSE SERPL-MCNC: 101 MG/DL (ref 70–99)
HDLC SERPL-MCNC: 62 MG/DL
LDLC SERPL CALC-MCNC: 62 MG/DL
NONHDLC SERPL-MCNC: 77 MG/DL
POTASSIUM SERPL-SCNC: 4.7 MMOL/L (ref 3.4–5.3)
PROT SERPL-MCNC: 7.4 G/DL (ref 6.8–8.8)
SODIUM SERPL-SCNC: 139 MMOL/L (ref 133–144)
TRIGL SERPL-MCNC: 77 MG/DL

## 2018-10-17 PROCEDURE — 99213 OFFICE O/P EST LOW 20 MIN: CPT | Mod: 25 | Performed by: INTERNAL MEDICINE

## 2018-10-17 PROCEDURE — 90715 TDAP VACCINE 7 YRS/> IM: CPT | Performed by: INTERNAL MEDICINE

## 2018-10-17 PROCEDURE — G0439 PPPS, SUBSEQ VISIT: HCPCS | Performed by: INTERNAL MEDICINE

## 2018-10-17 PROCEDURE — 36415 COLL VENOUS BLD VENIPUNCTURE: CPT | Performed by: INTERNAL MEDICINE

## 2018-10-17 PROCEDURE — 80061 LIPID PANEL: CPT | Performed by: INTERNAL MEDICINE

## 2018-10-17 PROCEDURE — 80053 COMPREHEN METABOLIC PANEL: CPT | Performed by: INTERNAL MEDICINE

## 2018-10-17 PROCEDURE — 90471 IMMUNIZATION ADMIN: CPT | Performed by: INTERNAL MEDICINE

## 2018-10-17 RX ORDER — NITROGLYCERIN 0.4 MG/1
0.4 TABLET SUBLINGUAL EVERY 5 MIN PRN
Qty: 60 TABLET | Refills: 1 | Status: SHIPPED | OUTPATIENT
Start: 2018-10-17 | End: 2023-08-14

## 2018-10-17 RX ORDER — PANTOPRAZOLE SODIUM 40 MG/1
40 TABLET, DELAYED RELEASE ORAL DAILY
Qty: 90 TABLET | Refills: 3 | Status: SHIPPED | OUTPATIENT
Start: 2018-10-17 | End: 2019-10-31

## 2018-10-17 RX ORDER — ATORVASTATIN CALCIUM 80 MG/1
80 TABLET, FILM COATED ORAL DAILY
Qty: 90 TABLET | Refills: 3 | Status: SHIPPED | OUTPATIENT
Start: 2018-10-17 | End: 2019-10-31

## 2018-10-17 RX ORDER — LISINOPRIL 5 MG/1
5 TABLET ORAL DAILY
Qty: 90 TABLET | Refills: 3 | Status: SHIPPED | OUTPATIENT
Start: 2018-10-17 | End: 2019-10-24

## 2018-10-17 ASSESSMENT — ACTIVITIES OF DAILY LIVING (ADL)
I_NEED_ASSISTANCE_FOR_THE_FOLLOWING_DAILY_ACTIVITIES:: NO ASSISTANCE IS NEEDED
CURRENT_FUNCTION: NO ASSISTANCE NEEDED

## 2018-10-17 NOTE — MR AVS SNAPSHOT
After Visit Summary   10/17/2018    Oliver Baires    MRN: [id number]           Patient Information     Date Of Birth          1940        Visit Information        Provider Department      10/17/2018 9:00 AM Renan Henry MD [facility]        Today's Diagnoses     Medicare annual wellness visit, subsequent    -  1    Need for prophylactic vaccination with tetanus-diphtheria (Td)        Coronary artery disease involving native coronary artery of native heart without angina pectoris        Hyperlipidemia with target LDL less than 100        Recurrent pulmonary embolism (H)        Essential hypertension        Gastroesophageal reflux disease without esophagitis        Other acute pulmonary embolism without acute cor pulmonale (H)        Hyperlipidemia LDL goal <100           Follow-ups after your visit        Follow-up notes from your care team     Return in about 1 year (around 10/17/2019) for Preventive Visit.      Who to contact     If you have questions or need follow up information about today's clinic visit or your schedule please contact [facility] directly at [phone].  Normal or non-critical lab and imaging results will be communicated to you by [employer], letter or phone within 4 business days after the clinic has received the results. If you do not hear from us within 7 days, please contact the clinic through [employer] or phone. If you have a critical or abnormal lab result, we will notify you by phone as soon as possible.  Submit refill requests through [employer] or call your pharmacy and they will forward the refill request to us. Please allow 3 business days for your refill to be completed.          Additional Information About Your Visit        MyChart Information     [employer] lets you send messages to your doctor, view your test results, renew your prescriptions, schedule appointments and more. To sign up, go to  [email or web address] . Click on \"Log in\" on the left side of the screen, which will take you to the Welcome page. Then click on \"Sign up Now\" on the right side of the page.     You will be asked to enter the access code listed below, as well as some personal information. Please follow the directions to create your username and password.     Your access code is: [email or web address]  Expires: 1/15/2019  9:37 AM     Your access code will  in 90 days. If you need help or a new code, please call your [place] clinic or [phone].        Care EveryWhere ID     This is your Care EveryWhere ID. This could be used by other organizations to access your [place] medical records  [id number]        Your Vitals Were     Pulse Temperature Respirations Pulse Oximetry BMI (Body Mass Index)       65 97.6  F (36.4  C) (Oral) 16 98% 25.28 kg/m2        Blood Pressure from Last 3 Encounters:   10/17/18 115/70   18 121/76   18 111/72    Weight from Last 3 Encounters:   10/17/18 196 lb 14.4 oz (89.3 kg)   18 190 lb (86.2 kg)   18 200 lb (90.7 kg)              We Performed the Following     Comprehensive metabolic panel (BMP + Alb, Alk Phos, ALT, AST, Total. Bili, TP)     Lipid panel reflex to direct LDL Fasting     PAF COMPLETED     TDAP VACCINE (ADACEL)          Today's Medication Changes          These changes are accurate as of 10/17/18  9:37 AM.  If you have any questions, ask your nurse or doctor.               These medicines have changed or have updated prescriptions.        Dose/Directions    atorvastatin 80 MG tablet   Commonly known as:  LIPITOR   This may have changed:  See the new instructions.   Used for:  Hyperlipidemia LDL goal <100   Changed by:  Renan Henry MD        Dose:  80 mg   Take 1 tablet (80 mg) by mouth daily   Quantity:  90 tablet   Refills:  3       nitroGLYcerin 0.4 MG sublingual tablet   Commonly known as:  NITROSTAT   This may have changed:  reasons to take this   Used " for:  Coronary artery disease involving native coronary artery of native heart without angina pectoris   Changed by:  Renan Henry MD        Dose:  0.4 mg   Place 1 tablet (0.4 mg) under the tongue every 5 minutes as needed for chest pain up to 3 tablets per episode.   Quantity:  60 tablet   Refills:  1            Where to get your medicines      These medications were sent to [facility]/pharmacy #[id number] [facility] [address] [facility]  [address] [place] [address]     Phone:  [phone]     atorvastatin 80 MG tablet    lisinopril 5 MG tablet    nitroGLYcerin 0.4 MG sublingual tablet    pantoprazole 40 MG EC tablet    rivaroxaban ANTICOAGULANT 20 MG Tabs tablet                Primary Care Provider Office Phone # Fax #    Renan Henry [phone]       [address] [address]        Equal Access to Services     [facility]: Hadii aad ku hadasho Soomaali, waaxda luqadaha, qaybta kaalmada adeegyada, waxay idiin hayaan adeadebayo campos . So [facility] [phone].    ATENCIÓN: Si habla español, tiene a alva disposición servicios gratuitos de asistencia lingüística. Llame al [phone].    We comply with applicable federal civil rights laws and [place] laws. We do not discriminate on the basis of race, color, national origin, age, disability, sex, sexual orientation, or gender identity.            Thank you!     Thank you for choosing [facility]  for your care. Our goal is always to provide you with excellent care. Hearing back from our patients is one way we can continue to improve our services. Please take a few minutes to complete the written survey that you may receive in the mail after your visit with us. Thank you!             Your Updated Medication List - Protect others around you: Learn how to safely use, store and throw away your medicines at www.disposemymeds.org.          This list is accurate as of 10/17/18  9:37 AM.   Always use your most recent med list.                   Brand Name Dispense Instructions for use Diagnosis    atorvastatin 80 MG tablet    LIPITOR    90 tablet    Take 1 tablet (80 mg) by mouth daily    Hyperlipidemia LDL goal <100       lisinopril 5 MG tablet    PRINIVIL/ZESTRIL    90 tablet    Take 1 tablet (5 mg) by mouth daily    Essential hypertension       MULTIVITAMIN ADULTS PO      Take 1 tablet by mouth daily        nitroGLYcerin 0.4 MG sublingual tablet    NITROSTAT    60 tablet    Place 1 tablet (0.4 mg) under the tongue every 5 minutes as needed for chest pain up to 3 tablets per episode.    Coronary artery disease involving native coronary artery of native heart without angina pectoris       pantoprazole 40 MG EC tablet    PROTONIX    90 tablet    Take 1 tablet (40 mg) by mouth daily    Gastroesophageal reflux disease without esophagitis       rivaroxaban ANTICOAGULANT 20 MG Tabs tablet    XARELTO    90 tablet    Take 1 tablet (20 mg) by mouth daily (with dinner)    Other acute pulmonary embolism without acute cor pulmonale (H)       VITAMIN D PO      Take 1 tablet by mouth every other day (OTC: Pt unsure of strength)

## 2018-10-17 NOTE — PROGRESS NOTES
SUBJECTIVE:   Oliver Baires is a 78 year old male who presents for Preventive Visit.      Are you in the first 12 months of your Medicare coverage?  No    Physical   Annual:     Getting at least 3 servings of Calcium per day:  Yes    Bi-annual eye exam:  Yes    Dental care twice a year:  Yes    Sleep apnea or symptoms of sleep apnea:  None    Diet:  Regular (no restrictions)    Taking medications regularly:  Yes    Medication side effects:  None    Additional concerns today:  YES    Ability to successfully perform activities of daily living: no assistance needed    Home Safety:  No safety concerns identified    Hearing Impairment: difficulty following a conversation in a noisy restaurant or crowded room, difficulty following dialogue in the theater, difficult to understand a speaker at a public meeting or Taoism service and difficulty understanding speech on the telephone      Ability to successfully perform activities of daily living: Yes, no assistance needed  Home safety:  none identified   Hearing impairment: Yes, hearing aids both ears    Fall risk:  Fallen 2 or more times in the past year?: No  Any fall with injury in the past year?: No    COGNITIVE SCREEN  1) Repeat 3 items (Leader, Season, Table)    2) Clock draw: NORMAL  3) 3 item recall: Recalls 3 objects  Results: 3 items recalled: COGNITIVE IMPAIRMENT LESS LIKELY    Mini-CogTM Copyright BECKIE Kumari. Licensed by the author for use in [facility]; reprinted with permission (soob@.[facility]). All rights reserved.        Reviewed and updated as needed this visit by clinical staff  Tobacco  Allergies  Meds         Reviewed and updated as needed this visit by Provider        Social History   Substance Use Topics     Smoking status: Former Smoker     Quit date: 8/21/1980     Smokeless tobacco: Never Used     Alcohol use 0.0 oz/week     0 Standard drinks or equivalent per week      Comment: 2/day       Alcohol Use 10/17/2018   If you drink alcohol do  you typically have greater than 3 drinks per day OR greater than 7 drinks per week? No     Question 1.  In the last 12 months: We worried food would run out before we had money to buy more. Never True    Question 2.  In the last 12 months: The food we bought just didn't last and we didn't have money to buy more. Never True    Did the patient answer Sometimes True or Often True to EITHER Question 1 or Question 2? No          Pt has had some pulmonary embolisms in the past and he is having some symptoms left back pain so he would like that checked out    Today's PHQ-2 Score:   PHQ-2 ( 1999 [employer]) 10/17/2018   Q1: Little interest or pleasure in doing things 0   Q2: Feeling down, depressed or hopeless 0   PHQ-2 Score 0   Q1: Little interest or pleasure in doing things Not at all   Q2: Feeling down, depressed or hopeless Not at all   PHQ-2 Score 0       Do you feel safe in your environment - Yes    Do you have a Health Care Directive?: Yes: Patient states has Advance Directive and will bring in a copy to clinic.    Current providers sharing in care for this patient include:   Patient Care Team:  Renan Henry MD as PCP - General    The following health maintenance items are reviewed in Epic and correct as of today:  Health Maintenance   Topic Date Due     COLONOSCOPY Q5 YR  05/02/2017     TETANUS IMMUNIZATION (SYSTEM ASSIGNED)  11/14/2017     INFLUENZA VACCINE (1) 09/01/2018     FALL RISK ASSESSMENT  11/01/2018     LIPID MONITORING Q1 YEAR  12/28/2018     CMP Q1 YR  01/17/2019     PHQ-2 Q1 YR  01/17/2019     ADVANCE DIRECTIVE PLANNING Q5 YRS  10/17/2023     PNEUMOCOCCAL  Completed     Patient continues on medical therapy for hypertension and hyperlipidemia, in context of his coronary artery disease.  Continues to follow with cardiology.  He is on a atorvastatin at 80 mg a day, has only been able to achieve an LDL of just under 100 even at that dose, his cardiologist has discussed alternate therapies.    Patient  "also remains on long-term Xarelto, for his history of recurrent pulmonary embolism.    Needs refills of all medications today.    Review of Systems  Constitutional, HEENT, cardiovascular, pulmonary, GI, , musculoskeletal, neuro, skin, endocrine and psych systems are negative, except as otherwise noted.    OBJECTIVE:   /70  Pulse 65  Temp 97.6  F (36.4  C) (Oral)  Resp 16  Wt 196 lb 14.4 oz (89.3 kg)  SpO2 98%  BMI 25.28 kg/m2 Estimated body mass index is 25.28 kg/(m^2) as calculated from the following:    Height as of 6/25/18: 6' 2\" (1.88 m).    Weight as of this encounter: 196 lb 14.4 oz (89.3 kg).  Physical Exam  GENERAL: healthy, alert and no distress  NECK: no adenopathy, no asymmetry, masses, or scars and thyroid normal to palpation  RESP: lungs clear to auscultation - no rales, rhonchi or wheezes  CV: regular rate and rhythm, normal S1 S2, no S3 or S4, no murmur, click or rub, no peripheral edema and peripheral pulses strong  ABDOMEN: soft, nontender, no hepatosplenomegaly, no masses and bowel sounds normal  MS: no gross musculoskeletal defects noted, no edema        ASSESSMENT / PLAN:   1. Medicare annual wellness visit, subsequent      2. Recurrent pulmonary embolism (H)  Continue long-term anticoagulation  - PAF COMPLETED    3. Coronary artery disease involving native coronary artery of native heart without angina pectoris  Continue current medical therapy, needs updated nitroglycerin prescription  - PAF COMPLETED  - nitroGLYcerin (NITROSTAT) 0.4 MG sublingual tablet; Place 1 tablet (0.4 mg) under the tongue every 5 minutes as needed for chest pain up to 3 tablets per episode.  Dispense: 60 tablet; Refill: 1      4. Essential hypertension  Stable, well-controlled  - lisinopril (PRINIVIL/ZESTRIL) 5 MG tablet; Take 1 tablet (5 mg) by mouth daily  Dispense: 90 tablet; Refill: 3    5. Gastroesophageal reflux disease without esophagitis    - pantoprazole (PROTONIX) 40 MG EC tablet; Take 1 tablet " "(40 mg) by mouth daily  Dispense: 90 tablet; Refill: 3    6. Hyperlipidemia LDL goal <100  Re-check today  - atorvastatin (LIPITOR) 80 MG tablet; Take 1 tablet (80 mg) by mouth daily  Dispense: 90 tablet; Refill: 3  - Lipid panel reflex to direct LDL Fasting  - Comprehensive metabolic panel (BMP + Alb, Alk Phos, ALT, AST, Total. Bili, TP)    7. Need for prophylactic vaccination with tetanus-diphtheria (Td)    - TDAP VACCINE (ADACEL)    End of Life Planning:  Patient currently has an advanced directive: Yes.  Practitioner is supportive of decision.    COUNSELING:  Reviewed preventive health counseling, as reflected in patient instructions    BP Readings from Last 1 Encounters:   10/17/18 115/70     Estimated body mass index is 25.28 kg/(m^2) as calculated from the following:    Height as of 6/25/18: 6' 2\" (1.88 m).    Weight as of this encounter: 196 lb 14.4 oz (89.3 kg).           reports that he quit smoking about 38 years ago. He has never used smokeless tobacco.      Appropriate preventive services were discussed with this patient, including applicable screening as appropriate for cardiovascular disease, diabetes, osteopenia/osteoporosis, and glaucoma.  As appropriate for age/gender, discussed screening for colorectal cancer, prostate cancer, breast cancer, and cervical cancer. Checklist reviewing preventive services available has been given to the patient.    Reviewed patients plan of care and provided an AVS. The Basic Care Plan (routine screening as documented in Health Maintenance) for Oliver meets the Care Plan requirement. This Care Plan has been established and reviewed with the Patient.    Counseling Resources:  ATP IV Guidelines  Pooled Cohorts Equation Calculator  Breast Cancer Risk Calculator  FRAX Risk Assessment  ICSI Preventive Guidelines  Dietary Guidelines for Americans, 2010  [employer] MyPlate  ASA Prophylaxis  Lung CA Screening    Renan Henry MD  [facility]  "

## 2018-10-17 NOTE — LETTER
10/17/2018         Oliver Baires  [address]  [place] [place] [address]            Dear Mr. Baires,    I am writing to inform you of the lab tests you had performed recently.      Your cholesterol results are as follows:    Lab Results   Component Value Date    CHOL 139 10/17/2018    HDL 62 10/17/2018    LDL 62 10/17/2018    TRIG 77 10/17/2018       Additional lab results are as follows:    Liver function: NORMAL  Kidney function: NORMAL  Electrolytes: NORMAL  Glucose: NORMAL    Your cholesterol actually looks much better than last years check; your LDL is now less than 70.  The cardiologist may be satisfied with this result, with your current medication.    Thank you for allowing me to participate in your care.  If you have further questions, please contact us at [phone].      Sincerely,        MICKY Henry MD  Dept. of Internal Medicine  [facility]

## 2018-10-30 ENCOUNTER — TRANSFERRED RECORDS (OUTPATIENT)
Dept: HEALTH INFORMATION MANAGEMENT | Facility: CLINIC | Age: 78
End: 2018-10-30

## 2019-04-16 NOTE — PROGRESS NOTES
"  SUBJECTIVE:   Oliver Baires is a 79 year old male who presents to clinic today for the following   health issues:        Breathing Issue      Duration: off and on for months    Description (location/character/radiation): Lung pain    Intensity:  2/10    Accompanying signs and symptoms: none    History (similar episodes/previous evaluation): Has had PE so he is concerned    Precipitating or alleviating factors: None    Therapies tried and outcome: None           Additional history: as documented    Reviewed  and updated as needed this visit by clinical staff         Reviewed and updated as needed this visit by Provider           Patient has history of recurrent pulmonary emboli, and is on lifelong anticoagulation, currently with Xarelto.  His initial PE was January 2017, and was associated with right lower extremity DVT presumably from long car ride to [place].  He completed 6 months of Xarelto at that time.  Then had unprovoked recurrent PE in October 2017, has been on Xarelto compliantly since then.    Is complaining of several weeks of pains in his lateral chest wall bilaterally, left greater than right, which are very reminiscent of the discomfort he had with previous pulmonary emboli.  Also, 1 of his recent chest CTs had shown a small right upper lobe nodule that is due for follow-up.    ROS:  Constitutional, HEENT, cardiovascular, pulmonary, GI, , musculoskeletal, neuro, skin, endocrine and psych systems are negative, except as otherwise noted.    OBJECTIVE:     /60 (BP Location: Left arm, Patient Position: Chair, Cuff Size: Adult Regular)   Pulse 106   Resp 16   Ht 1.88 m (6' 2\")   Wt 92.9 kg (204 lb 11.2 oz)   SpO2 97%   BMI 26.28 kg/m    Body mass index is 26.28 kg/m .  GENERAL: healthy, alert and no distress  NECK: no adenopathy, no asymmetry, masses, or scars and thyroid normal to palpation  RESP: lungs clear to auscultation - no rales, rhonchi or wheezes  CV: regular rate and rhythm, normal " S1 S2, no S3 or S4, no murmur, click or rub, no peripheral edema and peripheral pulses strong  ABDOMEN: soft, nontender, no hepatosplenomegaly, no masses and bowel sounds normal  MS: no gross musculoskeletal defects noted, no edema        ASSESSMENT/PLAN:     1. Chest wall pain  Doubt recurrent PE given compliance with Xarelto, but patient quite concerned given the symptoms.  Given the fact that we need to perform imaging anyway to follow-up on previous right upper lobe nodule, we will go ahead and repeat the chest CT with contrast to definitively rule out pulmonary embolism.  - CT Chest Pulmonary Embolism w Contrast; Future    See Patient Instructions    Renan Henry MD  [facility]

## 2019-04-17 ENCOUNTER — OFFICE VISIT (OUTPATIENT)
Dept: INTERNAL MEDICINE | Facility: CLINIC | Age: 79
End: 2019-04-17
Payer: COMMERCIAL

## 2019-04-17 VITALS
HEIGHT: 74 IN | DIASTOLIC BLOOD PRESSURE: 60 MMHG | RESPIRATION RATE: 16 BRPM | BODY MASS INDEX: 26.27 KG/M2 | WEIGHT: 204.7 LBS | SYSTOLIC BLOOD PRESSURE: 100 MMHG | OXYGEN SATURATION: 97 % | HEART RATE: 106 BPM

## 2019-04-17 DIAGNOSIS — R07.89 CHEST WALL PAIN: Primary | ICD-10-CM

## 2019-04-17 DIAGNOSIS — I26.99 RECURRENT PULMONARY EMBOLISM (H): ICD-10-CM

## 2019-04-17 PROCEDURE — 99214 OFFICE O/P EST MOD 30 MIN: CPT | Performed by: INTERNAL MEDICINE

## 2019-04-17 ASSESSMENT — MIFFLIN-ST. JEOR: SCORE: 1713.26

## 2019-04-26 ENCOUNTER — TELEPHONE (OUTPATIENT)
Dept: INTERNAL MEDICINE | Facility: CLINIC | Age: 79
End: 2019-04-26

## 2019-04-26 ENCOUNTER — HOSPITAL ENCOUNTER (OUTPATIENT)
Dept: CT IMAGING | Facility: CLINIC | Age: 79
Discharge: HOME | End: 2019-04-26
Attending: INTERNAL MEDICINE | Admitting: INTERNAL MEDICINE
Payer: COMMERCIAL

## 2019-04-26 DIAGNOSIS — R07.89 CHEST WALL PAIN: ICD-10-CM

## 2019-04-26 LAB
CREAT BLD-MCNC: 0.9 MG/DL (ref 0.66–1.25)
GFR SERPL CREATININE-BSD FRML MDRD: 81 ML/MIN/{1.73_M2}

## 2019-04-26 PROCEDURE — 25000128 H RX IP 250 OP 636: Performed by: INTERNAL MEDICINE

## 2019-04-26 PROCEDURE — 71260 CT THORAX DX C+: CPT

## 2019-04-26 PROCEDURE — 82565 ASSAY OF CREATININE: CPT

## 2019-04-26 RX ORDER — IOPAMIDOL 755 MG/ML
500 INJECTION, SOLUTION INTRAVASCULAR ONCE
Status: COMPLETED | OUTPATIENT
Start: 2019-04-26 | End: 2019-04-26

## 2019-04-26 RX ADMIN — IOPAMIDOL 74 ML: 755 INJECTION, SOLUTION INTRAVENOUS at 08:40

## 2019-04-26 RX ADMIN — SODIUM CHLORIDE 87 ML: 9 INJECTION, SOLUTION INTRAVENOUS at 08:40

## 2019-04-26 NOTE — TELEPHONE ENCOUNTER
Received and reviewed chest CT results, which showed no evidence of any pulmonary embolism, and was otherwise normal.  Relayed these results to patient by phone, he seemed satisfied.

## 2019-05-23 ENCOUNTER — TRANSFERRED RECORDS (OUTPATIENT)
Dept: HEALTH INFORMATION MANAGEMENT | Facility: CLINIC | Age: 79
End: 2019-05-23

## 2019-06-09 ENCOUNTER — OFFICE VISIT (OUTPATIENT)
Dept: URGENT CARE | Facility: URGENT CARE | Age: 79
End: 2019-06-09
Payer: COMMERCIAL

## 2019-06-09 VITALS
RESPIRATION RATE: 20 BRPM | WEIGHT: 201 LBS | TEMPERATURE: 97.9 F | BODY MASS INDEX: 25.81 KG/M2 | HEART RATE: 91 BPM | OXYGEN SATURATION: 97 % | DIASTOLIC BLOOD PRESSURE: 66 MMHG | SYSTOLIC BLOOD PRESSURE: 110 MMHG

## 2019-06-09 DIAGNOSIS — J06.9 VIRAL URI WITH COUGH: Primary | ICD-10-CM

## 2019-06-09 PROCEDURE — 99213 OFFICE O/P EST LOW 20 MIN: CPT | Performed by: FAMILY MEDICINE

## 2019-06-09 NOTE — PROGRESS NOTES
Chief Complaint   Patient presents with     Cough     cough for 2 days     SUBJECTIVE:   Oliver Baires is a 79 year old male history of coronary artery disease, GERD, hyperlipidemia, hypertension presenting with a chief complaint of stuffy nose, cough - non-productive and sore throat. He is an established patient of [place].  Onset of symptoms was 2 day(s) ago.  Course of illness is worsening.    Severity moderate  Current and Associated symptoms: cough - non-productive  Treatment measures tried include OTC Cough med.  Predisposing factors include None.    Past Medical History:   Diagnosis Date     Arthritis     Left knee-L knee injury     AV block      Bradycardia     not on bb due to low HR     CAD (coronary artery disease)     Cath 6/2010- angioplasty & Xience 3.0x15mm stent to prox LAD; STEMI 10/2004- 3.5 x 33mm Cypher ALEJANDRA to distal RCA, angioplasty to VINCENT, Lcx25%, Lma 25%     ED (erectile dysfunction)      GERD (gastroesophageal reflux disease)      Hyperlipidemia      Hypertension      IBS (irritable bowel syndrome)      Impaired fasting glucose      MI (myocardial infarction) (H)     STEMI 10/2004     Personal history of colonic polyps     Tubular adenomas excised 1999, 2002     Pneumonia 2004    pleural effusion     Pulmonary emboli (H) 2017    recurrent PE when off anticoag-now lifelong anticoag (unprovoked after car ride)- right leg dvt     Skin cancer      Current Outpatient Medications   Medication Sig Dispense Refill     atorvastatin (LIPITOR) 80 MG tablet Take 1 tablet (80 mg) by mouth daily 90 tablet 3     Cholecalciferol (VITAMIN D PO) Take 1 tablet by mouth every other day (OTC: Pt unsure of strength)       lisinopril (PRINIVIL/ZESTRIL) 5 MG tablet Take 1 tablet (5 mg) by mouth daily 90 tablet 3     Multiple Vitamins-Minerals (MULTIVITAMIN ADULTS PO) Take 1 tablet by mouth daily        nitroGLYcerin (NITROSTAT) 0.4 MG sublingual tablet Place 1 tablet (0.4 mg) under the tongue every 5 minutes as  needed for chest pain up to 3 tablets per episode. 60 tablet 1     pantoprazole (PROTONIX) 40 MG EC tablet Take 1 tablet (40 mg) by mouth daily 90 tablet 3     rivaroxaban ANTICOAGULANT (XARELTO) 20 MG TABS tablet Take 1 tablet (20 mg) by mouth daily (with dinner) 90 tablet 3     Social History     Tobacco Use     Smoking status: Former Smoker     Last attempt to quit: 1980     Years since quittin.8     Smokeless tobacco: Never Used   Substance Use Topics     Alcohol use: Yes     Alcohol/week: 0.0 oz     Comment: 2/day     Family History   Problem Relation Age of Onset     Cancer Father         Pancreatic CA,  age 82     Cardiovascular Mother          of ruptured AAA age 92     Cancer Brother         liver ca         ROS:    10 point ROS of systems including Constitutional, Eyes,  Cardiovascular, Gastroenterology, Genitourinary, Integumentary, Muscularskeletal, Psychiatric were all negative except for pertinent positives noted in my HPI         OBJECTIVE:  /66 (Cuff Size: Adult Regular)   Pulse 91   Temp 97.9  F (36.6  C) (Oral)   Resp 20   Wt 91.2 kg (201 lb)   SpO2 97%   BMI 25.81 kg/m    GENERAL APPEARANCE: healthy, alert and no distress  EYES: EOMI,  PERRL, conjunctiva clear  HENT: ear canals and TM's normal.  Nose and mouth without ulcers, erythema or lesions  NECK: supple, nontender, no lymphadenopathy  RESP: lungs clear to auscultation - no rales, rhonchi or wheezes  CV: regular rates and rhythm, normal S1 S2, no murmur noted  ABDOMEN:  soft, nontender, no HSM or masses and bowel sounds normal  SKIN: no suspicious lesions or rashes  Physical Exam      X-Ray was not done.    Medical Decision Making:    Differential Diagnosis:  URI Adult/Peds:  Bronchitis-viral, Viral pharyngitis, Viral syndrome and Viral upper respiratory illness      ASSESSMENT:  Oliver was seen today for cough.    Diagnoses and all orders for this visit:    Viral URI with cough          PLAN:  Tylenol, Fluids,  Rest, Saline gargles, Saline nasal spray and Vaporizer  Afsaneh with patient can do over-the-counter cough meds  If symptoms worsen or has any worsening productive cough with chest pain chills should follow-up for an x-ray.  I do not think he needs an x-ray at this point.  Patient understood and agreed with plan    Luciana Pederson MD     See orders in Epic

## 2019-06-21 ENCOUNTER — OFFICE VISIT (OUTPATIENT)
Dept: INTERNAL MEDICINE | Facility: CLINIC | Age: 79
End: 2019-06-21
Payer: COMMERCIAL

## 2019-06-21 VITALS
WEIGHT: 198.9 LBS | DIASTOLIC BLOOD PRESSURE: 70 MMHG | SYSTOLIC BLOOD PRESSURE: 120 MMHG | TEMPERATURE: 98.2 F | OXYGEN SATURATION: 97 % | RESPIRATION RATE: 16 BRPM | BODY MASS INDEX: 25.54 KG/M2 | HEART RATE: 72 BPM

## 2019-06-21 DIAGNOSIS — J06.9 VIRAL URI WITH COUGH: Primary | ICD-10-CM

## 2019-06-21 PROCEDURE — 99213 OFFICE O/P EST LOW 20 MIN: CPT | Performed by: INTERNAL MEDICINE

## 2019-06-21 PROCEDURE — 99207 C PAF COMPLETED  NO CHARGE: CPT | Mod: 25 | Performed by: INTERNAL MEDICINE

## 2019-06-21 NOTE — PROGRESS NOTES
"Subjective     Oliver Baires is a 79 year old male who presents to clinic today for the following health issues:    HPI   ED/UC Followup:    Facility:  [facility]  Date of visit: 06/09/2019  Reason for visit: Viral URI with cough  Current Status: Has cleared up a little still has the cough and a little phlegm still coming up (clear)           Reviewed and updated as needed this visit by Provider         Review of Systems   ROS COMP: Constitutional, HEENT, cardiovascular, pulmonary, gi and gu systems are negative, except as otherwise noted.      Objective    /70 (BP Location: Left arm, Patient Position: Chair, Cuff Size: Adult Regular)   Pulse 72   Temp 98.2  F (36.8  C) (Oral)   Resp 16   Wt 90.2 kg (198 lb 14.4 oz)   SpO2 97%   BMI 25.54 kg/m    Body mass index is 25.54 kg/m .  Physical Exam   GENERAL: healthy, alert and no distress  NECK: no adenopathy, no asymmetry, masses, or scars and thyroid normal to palpation  RESP: lungs clear to auscultation - no rales, rhonchi or wheezes  CV: regular rate and rhythm, normal S1 S2, no S3 or S4, no murmur, click or rub, no peripheral edema and peripheral pulses strong  ABDOMEN: soft, nontender, no hepatosplenomegaly, no masses and bowel sounds normal  MS: no gross musculoskeletal defects noted, no edema            Assessment & Plan     1. Viral URI with cough  Exam completely normal, and symptoms improving.  No specific therapy necessary.       BMI:   Estimated body mass index is 25.54 kg/m  as calculated from the following:    Height as of 4/17/19: 1.88 m (6' 2\").    Weight as of this encounter: 90.2 kg (198 lb 14.4 oz).               Return in about 4 weeks (around 7/19/2019) for recheck, if symptoms fail to improve.    Renan Henry MD  [facility]        "

## 2019-09-24 ENCOUNTER — OFFICE VISIT (OUTPATIENT)
Dept: DERMATOLOGY | Facility: CLINIC | Age: 79
End: 2019-09-24
Payer: COMMERCIAL

## 2019-09-24 VITALS — SYSTOLIC BLOOD PRESSURE: 117 MMHG | HEART RATE: 79 BPM | OXYGEN SATURATION: 98 % | DIASTOLIC BLOOD PRESSURE: 78 MMHG

## 2019-09-24 DIAGNOSIS — L81.4 LENTIGO: ICD-10-CM

## 2019-09-24 DIAGNOSIS — L82.1 SEBORRHEIC KERATOSIS: ICD-10-CM

## 2019-09-24 DIAGNOSIS — D18.01 ANGIOMA OF SKIN: ICD-10-CM

## 2019-09-24 DIAGNOSIS — L57.0 AK (ACTINIC KERATOSIS): Primary | ICD-10-CM

## 2019-09-24 DIAGNOSIS — D22.9 NEVUS: ICD-10-CM

## 2019-09-24 PROCEDURE — 17003 DESTRUCT PREMALG LES 2-14: CPT | Performed by: PHYSICIAN ASSISTANT

## 2019-09-24 PROCEDURE — 99213 OFFICE O/P EST LOW 20 MIN: CPT | Mod: 25 | Performed by: PHYSICIAN ASSISTANT

## 2019-09-24 PROCEDURE — 17000 DESTRUCT PREMALG LESION: CPT | Performed by: PHYSICIAN ASSISTANT

## 2019-09-24 NOTE — LETTER
9/24/2019         RE: Oliver Baires  [address]  [place] [address]        Dear Colleague,    Thank you for referring your patient, Oliver Baires, to the [facility]. Please see a copy of my visit note below.    HPI:   Chief complaint: Oliver Baires is a 79 year old male who presents for Full skin cancer screening to rule out skin cancer.  Last Skin Exam: 1 year ago     1st Baseline: no  Personal HX of Skin Cancer: SCC right lower leg 2018   Personal HX of Malignant Melanoma: none   Family HX of Skin Cancer / Malignant Melanoma: none  Personal HX of Atypical Moles: none  Risk factors: sun exposure   New / Changing lesions: yes, spots on bilateral ears  Social History: he enjoys gardening. Has hearing aids in today. Traveled to [place] every winter until had pulmonary embolisms and wife has TIA.  On review of systems, there are no further skin complaints, patient is feeling otherwise well.  See patient intake sheet.  ROS of the following were done and are negative: Constitutional, Eyes, Ears, Nose,   Mouth, Throat, Cardiovascular, Respiratory, GI, Genitourinary, Musculoskeletal,   Psychiatric, Endocrine, Allergic/Immunologic.    This document serves as a record of the services and decisions personally performed and made by Michelle Major, MS, PA-C. It was created on her behalf by Claire Renee, a trained medical scribe. The creation of this document is based on the provider's statements to the medical scribe.  Claire Renee 2:11 PM September 24, 2019    PHYSICAL EXAM:   /78   Pulse 79   SpO2 98%   Skin exam performed as follows: Type 2 skin. Mood appropriate  Alert and Oriented X 3. Well developed, well nourished in no distress.  General appearance: Normal  Head including face: Normal  Eyes: conjunctiva and lids: Normal  Mouth: Lips, teeth, gums: Normal  Neck: Normal  Chest-breast/axillae: Normal  Back: Normal  Spleen and liver:  Normal  Cardiovascular: Exam of peripheral vascular system by observation for swelling, varicosities, edema: Normal  Genitalia: groin, buttocks: Normal  Extremities: digits/nails (clubbing): Normal  Eccrine and Apocrine glands: Normal  Right upper extremity: Normal  Left upper extremity: Normal  Right lower extremity: Normal  Left lower extremity: Normal  Skin: Scalp and body hair: See below    Pt deferred exam of breasts, groin, buttocks: No    Other physical findings:  1. Multiple pigmented macules on extremities and trunk  2. Multiple pigmented macules on face, trunk and extremities  3. Multiple vascular papules on trunk, arms and legs  4. Multiple scattered keratotic plaques  5. Pink scaly papules on right helix x 1, left helix x 1, and vertex scalp x 5      Except as noted above, no other signs of skin cancer or melanoma.     ASSESSMENT/PLAN:   Benign Full skin cancer screening today.     Patient with history of none  Advised on monthly self exams and 1 year  Patient Education: Appropriate brochures given.    Multiple benign appearing nevi on arms, legs and trunk. Discussed ABCDEs of melanoma and sunscreen.   Multiple lentigos on arms, legs and trunk. Advised benign, no treatment needed.  Multiple scattered angiomas. Advised benign, no treatment needed.   Seborrheic keratosis on arms, legs and trunk. Advised benign, no treatment needed.  Actinic keratosis on right helix x 1, left helix x 1, and vertex scalp x 5. As precancerous, cryosurgery performed. Advised on blistering and post-op care. Advised if not resolved in 1-2 months to return for evaluation      Follow-up: yearly FSE/PRN sooner    1.) Patient was asked about new and changing moles. YES  2.) Patient received a complete physical skin examination: YES  3.) Patient was counseled to perform a monthly self skin examination: YES  Scribed By:Claire Renee, Medical Scribe    The information in this document, created by the medical scribe for me,  accurately reflects the services I personally performed and the decisions made by me. I have reviewed and approved this document for accuracy prior to leaving the patient care area.  September 24, 2019 2:18 PM    Michelle Major MS, PA-C      Again, thank you for allowing me to participate in the care of your patient.        Sincerely,        Michelle Major PA-C

## 2019-09-24 NOTE — PROGRESS NOTES
HPI:   Chief complaint: Oliver Baires is a 79 year old male who presents for Full skin cancer screening to rule out skin cancer.  Last Skin Exam: 1 year ago     1st Baseline: no  Personal HX of Skin Cancer: SCC right lower leg 2018   Personal HX of Malignant Melanoma: none   Family HX of Skin Cancer / Malignant Melanoma: none  Personal HX of Atypical Moles: none  Risk factors: sun exposure   New / Changing lesions: yes, spots on bilateral ears  Social History: he enjoys gardening. Has hearing aids in today. Traveled to [place] every winter until had pulmonary embolisms and wife has TIA.  On review of systems, there are no further skin complaints, patient is feeling otherwise well.  See patient intake sheet.  ROS of the following were done and are negative: Constitutional, Eyes, Ears, Nose,   Mouth, Throat, Cardiovascular, Respiratory, GI, Genitourinary, Musculoskeletal,   Psychiatric, Endocrine, Allergic/Immunologic.    This document serves as a record of the services and decisions personally performed and made by Michelle Major, MS, PA-C. It was created on her behalf by Claire Renee, a trained medical scribe. The creation of this document is based on the provider's statements to the medical scribe.  Claire Renee 2:11 PM September 24, 2019    PHYSICAL EXAM:   /78   Pulse 79   SpO2 98%   Skin exam performed as follows: Type 2 skin. Mood appropriate  Alert and Oriented X 3. Well developed, well nourished in no distress.  General appearance: Normal  Head including face: Normal  Eyes: conjunctiva and lids: Normal  Mouth: Lips, teeth, gums: Normal  Neck: Normal  Chest-breast/axillae: Normal  Back: Normal  Spleen and liver: Normal  Cardiovascular: Exam of peripheral vascular system by observation for swelling, varicosities, edema: Normal  Genitalia: groin, buttocks: Normal  Extremities: digits/nails (clubbing): Normal  Eccrine and Apocrine glands: Normal  Right upper extremity:  Normal  Left upper extremity: Normal  Right lower extremity: Normal  Left lower extremity: Normal  Skin: Scalp and body hair: See below    Pt deferred exam of breasts, groin, buttocks: No    Other physical findings:  1. Multiple pigmented macules on extremities and trunk  2. Multiple pigmented macules on face, trunk and extremities  3. Multiple vascular papules on trunk, arms and legs  4. Multiple scattered keratotic plaques  5. Pink scaly papules on right helix x 1, left helix x 1, and vertex scalp x 5      Except as noted above, no other signs of skin cancer or melanoma.     ASSESSMENT/PLAN:   Benign Full skin cancer screening today.     Patient with history of none  Advised on monthly self exams and 1 year  Patient Education: Appropriate brochures given.    Multiple benign appearing nevi on arms, legs and trunk. Discussed ABCDEs of melanoma and sunscreen.   Multiple lentigos on arms, legs and trunk. Advised benign, no treatment needed.  Multiple scattered angiomas. Advised benign, no treatment needed.   Seborrheic keratosis on arms, legs and trunk. Advised benign, no treatment needed.  Actinic keratosis on right helix x 1, left helix x 1, and vertex scalp x 5. As precancerous, cryosurgery performed. Advised on blistering and post-op care. Advised if not resolved in 1-2 months to return for evaluation      Follow-up: yearly FSE/PRN sooner    1.) Patient was asked about new and changing moles. YES  2.) Patient received a complete physical skin examination: YES  3.) Patient was counseled to perform a monthly self skin examination: YES  Scribed By:Claire Renee, Medical Scribe    The information in this document, created by the medical scribe for me, accurately reflects the services I personally performed and the decisions made by me. I have reviewed and approved this document for accuracy prior to leaving the patient care area.  September 24, 2019 2:18 PM    Michelle Major MS, PA-C

## 2019-09-30 DIAGNOSIS — I26.99 RECURRENT PULMONARY EMBOLISM (H): Primary | ICD-10-CM

## 2019-09-30 RX ORDER — RIVAROXABAN 20 MG/1
TABLET, FILM COATED ORAL
Qty: 90 TABLET | Refills: 3 | Status: SHIPPED | OUTPATIENT
Start: 2019-09-30 | End: 2020-09-21

## 2019-09-30 NOTE — TELEPHONE ENCOUNTER
Requested Prescriptions   Pending Prescriptions Disp Refills     XARELTO ANTICOAGULANT 20 MG TABS tablet [Pharmacy Med Name: XARELTO 20 MG TABLET] 90 tablet 3     Sig: TAKE 1 TABLET (20 MG) BY MOUTH DAILY (WITH DINNER)       Direct Oral Anticoagulant Agents Failed - 9/30/2019  2:17 AM        Failed - Normal Platelets on file in past 12 months     Recent Labs   Lab Test 11/26/17  2211                  Failed - Creatinine Clearance greater than 50 ml/min on file in past 3 mos     No lab results found.          Failed - Serum creatinine less than or equal to 1.4 on file in past 3 mos     Recent Labs   Lab Test 04/26/19  0840 10/17/18  1011   CR  --  0.89   CREAT 0.9  --              Passed - Medication is active on med list        Passed - Patient is 18 years of age or older        Passed - Recent (6 mo) or future (30 days) visit within the authorizing provider's specialty        Last Written Prescription Date:  10/17/2018  Last Fill Quantity: 90,  # refills: 3   Last office visit: 6/21/2019 with prescribing provider:  Dr Henry   Future Office Visit:

## 2019-10-24 DIAGNOSIS — I10 ESSENTIAL HYPERTENSION: ICD-10-CM

## 2019-10-24 RX ORDER — LISINOPRIL 5 MG/1
TABLET ORAL
Qty: 30 TABLET | Refills: 0 | Status: SHIPPED | OUTPATIENT
Start: 2019-10-24 | End: 2019-10-31

## 2019-10-24 NOTE — TELEPHONE ENCOUNTER
"Requested Prescriptions   Pending Prescriptions Disp Refills     lisinopril (PRINIVIL/ZESTRIL) 5 MG tablet [Pharmacy Med Name: LISINOPRIL 5 MG TABLET] 90 tablet 3     Sig: TAKE 1 TABLET BY MOUTH EVERY DAY       ACE Inhibitors (Including Combos) Protocol Failed - 10/24/2019  2:23 AM        Failed - Normal serum potassium on file in past 12 months     Recent Labs   Lab Test 10/17/18  1011   POTASSIUM 4.7             Passed - Blood pressure under 140/90 in past 12 months     BP Readings from Last 3 Encounters:   09/24/19 117/78   06/21/19 120/70   06/09/19 110/66                 Passed - Recent (12 mo) or future (30 days) visit within the authorizing provider's specialty     Patient has had an office visit with the authorizing provider or a provider within the authorizing providers department within the previous 12 mos or has a future within next 30 days. See \"Patient Info\" tab in inbasket, or \"Choose Columns\" in Meds & Orders section of the refill encounter.              Passed - Medication is active on med list        Passed - Patient is age 18 or older        Passed - Normal serum creatinine on file in past 12 months     Recent Labs   Lab Test 04/26/19  0840 10/17/18  1011   CR  --  0.89   CREAT 0.9  --              Medication is being filled for 1 time refill only due to:  Patient needs labs K.    "

## 2019-10-24 NOTE — LETTER
[facility]  [address] [id number] [phone]            Oliver Baires  [address]  [facility] [address]        October 24, 2019    Dear Oliver,    While refilling your prescription today, we noticed that you are due to have labs drawn.  We will refill your prescription for 30 days, but a follow-up appointment must be made before any additional refills can be approved.     Taking care of your health is important to us and we look forward to seeing you in the near future.  Please call us at [phone] or [phone] (or use [employer]) to schedule an appointment.     Please disregard this notice if you have already made an appointment.    Sincerely,        [facility]

## 2019-10-31 ENCOUNTER — OFFICE VISIT (OUTPATIENT)
Dept: INTERNAL MEDICINE | Facility: CLINIC | Age: 79
End: 2019-10-31
Payer: COMMERCIAL

## 2019-10-31 VITALS
WEIGHT: 199.2 LBS | DIASTOLIC BLOOD PRESSURE: 70 MMHG | RESPIRATION RATE: 16 BRPM | SYSTOLIC BLOOD PRESSURE: 115 MMHG | BODY MASS INDEX: 25.58 KG/M2 | TEMPERATURE: 98 F | HEART RATE: 68 BPM | OXYGEN SATURATION: 98 %

## 2019-10-31 DIAGNOSIS — I48.0 PAROXYSMAL ATRIAL FIBRILLATION (H): Primary | ICD-10-CM

## 2019-10-31 DIAGNOSIS — I10 ESSENTIAL HYPERTENSION: ICD-10-CM

## 2019-10-31 DIAGNOSIS — K21.9 GASTROESOPHAGEAL REFLUX DISEASE WITHOUT ESOPHAGITIS: ICD-10-CM

## 2019-10-31 DIAGNOSIS — E78.5 HYPERLIPIDEMIA LDL GOAL <100: ICD-10-CM

## 2019-10-31 PROCEDURE — 99214 OFFICE O/P EST MOD 30 MIN: CPT | Performed by: INTERNAL MEDICINE

## 2019-10-31 PROCEDURE — 93000 ELECTROCARDIOGRAM COMPLETE: CPT | Performed by: INTERNAL MEDICINE

## 2019-10-31 RX ORDER — ATORVASTATIN CALCIUM 80 MG/1
80 TABLET, FILM COATED ORAL DAILY
Qty: 90 TABLET | Refills: 3 | Status: SHIPPED | OUTPATIENT
Start: 2019-10-31 | End: 2020-11-17

## 2019-10-31 RX ORDER — LISINOPRIL 5 MG/1
5 TABLET ORAL DAILY
Qty: 90 TABLET | Refills: 3 | Status: SHIPPED | OUTPATIENT
Start: 2019-10-31 | End: 2020-10-15

## 2019-10-31 RX ORDER — PANTOPRAZOLE SODIUM 40 MG/1
40 TABLET, DELAYED RELEASE ORAL DAILY
Qty: 90 TABLET | Refills: 3 | Status: SHIPPED | OUTPATIENT
Start: 2019-10-31 | End: 2019-12-31

## 2019-10-31 NOTE — PATIENT INSTRUCTIONS
- Continue all medications as you have been.     - Please come in for fasting labs in the next few weeks, at your convenience.  I will be in touch with you when I receive the results.      - Cardiology Clinic will contact you to schedule your echo.  Please contact us if you do not hear from them.

## 2019-10-31 NOTE — PROGRESS NOTES
Subjective     Oliver Baires is a 79 year old male who presents to clinic today for the following health issues:    HPI   Pt is here for possible A-Fib. Pt 3 weeks ago called 911 they came and did a ekg (brought that with today to discuss). Pt did not go to the Hospital. Pt is tired.      Patient continues to follow with cardiology for history of known coronary disease, has not required PCI since 2010.  Developed what he felt was likely muscular chest pain while doing some yard work 3 weeks ago, 911 called anyway, EKG in the field looked suspicious for atrial fibrillation.  Ventricular rate was controlled.  Was advised to follow-up with PCP.    No previous history of atrial fibrillation, he is already on Xarelto given history of recurrent pulmonary embolism.  Plan has always been to continue lifelong anticoagulation.    Feels perfectly well today, no obvious palpitations or increased fatigue.  Exertional capacity is normal.  Last stress test was 2015, cardiology had planned one for later this year.            Reviewed and updated as needed this visit by Provider  Tobacco  Allergies  Meds  Problems  Med Hx  Surg Hx  Fam Hx         Review of Systems   ROS COMP: Constitutional, HEENT, cardiovascular, pulmonary, GI, , musculoskeletal, neuro, skin, endocrine and psych systems are negative, except as otherwise noted.      Objective    /70 (BP Location: Left arm, Patient Position: Chair, Cuff Size: Adult Large)   Pulse 68   Temp 98  F (36.7  C) (Oral)   Resp 16   Wt 90.4 kg (199 lb 3.2 oz)   SpO2 98%   BMI 25.58 kg/m    Body mass index is 25.58 kg/m .  Physical Exam   GENERAL: healthy, alert and no distress  NECK: no adenopathy, no asymmetry, masses, or scars and thyroid normal to palpation  RESP: lungs clear to auscultation - no rales, rhonchi or wheezes  CV: irregularly irregular rhythm, normal S1 S2, no S3 or S4 and no murmur, click or rub  ABDOMEN: soft, nontender, no hepatosplenomegaly, no masses  and bowel sounds normal  MS: no gross musculoskeletal defects noted, no edema    EKG shows A. fib/flutter, ventricular rate of 60.        Assessment & Plan     1. Paroxysmal atrial fibrillation (H)  Briefly discuss principles of Afib management, including rate control and anticoagulation.  Based on risk score, patient certainly qualifies for full anticoagulation; fortunately, he is already on full dose of Xarelto.  With ventricular rate, will not add kendy blocking agents. Check ECHO, check labs as ordered.  Follow-up with cardiology in near future  - TSH with free T4 reflex; Future  - CBC with platelets; Future  - Echocardiogram Complete; Future    2. Hyperlipidemia LDL goal <100    - atorvastatin (LIPITOR) 80 MG tablet; Take 1 tablet (80 mg) by mouth daily  Dispense: 90 tablet; Refill: 3  - Lipid panel reflex to direct LDL Fasting; Future  - Comprehensive metabolic panel; Future    3. Essential hypertension    - lisinopril (PRINIVIL/ZESTRIL) 5 MG tablet; Take 1 tablet (5 mg) by mouth daily  Dispense: 90 tablet; Refill: 3    4. Gastroesophageal reflux disease without esophagitis    - pantoprazole (PROTONIX) 40 MG EC tablet; Take 1 tablet (40 mg) by mouth daily  Dispense: 90 tablet; Refill: 3       See Patient Instructions    Return in about 2 weeks (around 11/14/2019) for Fasting Lab Only Visit.    Renan Henry MD  [facility]

## 2019-11-01 DIAGNOSIS — E78.5 HYPERLIPIDEMIA LDL GOAL <100: ICD-10-CM

## 2019-11-01 DIAGNOSIS — I48.0 PAROXYSMAL ATRIAL FIBRILLATION (H): ICD-10-CM

## 2019-11-01 LAB
ALBUMIN SERPL-MCNC: 3.4 G/DL (ref 3.4–5)
ALP SERPL-CCNC: 60 U/L (ref 40–150)
ALT SERPL W P-5'-P-CCNC: 43 U/L (ref 0–70)
ANION GAP SERPL CALCULATED.3IONS-SCNC: 4 MMOL/L (ref 3–14)
AST SERPL W P-5'-P-CCNC: 35 U/L (ref 0–45)
BILIRUB SERPL-MCNC: 0.7 MG/DL (ref 0.2–1.3)
BUN SERPL-MCNC: 18 MG/DL (ref 7–30)
CALCIUM SERPL-MCNC: 8.8 MG/DL (ref 8.5–10.1)
CHLORIDE SERPL-SCNC: 107 MMOL/L (ref 94–109)
CHOLEST SERPL-MCNC: 160 MG/DL
CO2 SERPL-SCNC: 28 MMOL/L (ref 20–32)
CREAT SERPL-MCNC: 0.96 MG/DL (ref 0.66–1.25)
ERYTHROCYTE [DISTWIDTH] IN BLOOD BY AUTOMATED COUNT: 12.8 % (ref 10–15)
GFR SERPL CREATININE-BSD FRML MDRD: 75 ML/MIN/{1.73_M2}
GLUCOSE SERPL-MCNC: 100 MG/DL (ref 70–99)
HCT VFR BLD AUTO: 43.1 % (ref 40–53)
HDLC SERPL-MCNC: 51 MG/DL
HGB BLD-MCNC: 14.3 G/DL (ref 13.3–17.7)
LDLC SERPL CALC-MCNC: 87 MG/DL
MCH RBC QN AUTO: 32 PG (ref 26.5–33)
MCHC RBC AUTO-ENTMCNC: 33.2 G/DL (ref 31.5–36.5)
MCV RBC AUTO: 96 FL (ref 78–100)
NONHDLC SERPL-MCNC: 109 MG/DL
PLATELET # BLD AUTO: 174 10E9/L (ref 150–450)
POTASSIUM SERPL-SCNC: 4.1 MMOL/L (ref 3.4–5.3)
PROT SERPL-MCNC: 7 G/DL (ref 6.8–8.8)
RBC # BLD AUTO: 4.47 10E12/L (ref 4.4–5.9)
SODIUM SERPL-SCNC: 139 MMOL/L (ref 133–144)
TRIGL SERPL-MCNC: 108 MG/DL
TSH SERPL DL<=0.005 MIU/L-ACNC: 3.33 MU/L (ref 0.4–4)
WBC # BLD AUTO: 4.6 10E9/L (ref 4–11)

## 2019-11-01 PROCEDURE — 36415 COLL VENOUS BLD VENIPUNCTURE: CPT | Performed by: INTERNAL MEDICINE

## 2019-11-01 PROCEDURE — 85027 COMPLETE CBC AUTOMATED: CPT | Performed by: INTERNAL MEDICINE

## 2019-11-01 PROCEDURE — 84443 ASSAY THYROID STIM HORMONE: CPT | Performed by: INTERNAL MEDICINE

## 2019-11-01 PROCEDURE — 80053 COMPREHEN METABOLIC PANEL: CPT | Performed by: INTERNAL MEDICINE

## 2019-11-01 PROCEDURE — 80061 LIPID PANEL: CPT | Performed by: INTERNAL MEDICINE

## 2019-11-22 ENCOUNTER — HOSPITAL ENCOUNTER (OUTPATIENT)
Dept: CARDIOLOGY | Facility: CLINIC | Age: 79
Discharge: HOME | End: 2019-11-22
Attending: INTERNAL MEDICINE | Admitting: INTERNAL MEDICINE
Payer: COMMERCIAL

## 2019-11-22 DIAGNOSIS — I48.0 PAROXYSMAL ATRIAL FIBRILLATION (H): ICD-10-CM

## 2019-11-22 PROCEDURE — 93306 TTE W/DOPPLER COMPLETE: CPT | Mod: 26 | Performed by: INTERNAL MEDICINE

## 2019-11-22 PROCEDURE — 93306 TTE W/DOPPLER COMPLETE: CPT

## 2019-12-30 NOTE — PROGRESS NOTES
History of Present Illness:    Oliver Baires is a 79 year old male followed here by Dr. Sam who returns for his annual visit. In , he had and inferior wall MI with a stent to the RCA and PTCA of the PDA. He has ACS in  and the RCA was open with a 30-40% midvessel stenosis; he had developed a new lesion in his LAD which was stented, Circumflex had 25% stenosis.     Nuclear stress test in 2014 showed no MI or ischemia. EF is preserved. Nuclear stress test was to be done at the end of  but patient did not come in; letter was sent as well as a voice mail.    He has had recurrent DVT after completing 6 months of Xarelto in . His first PE occured during a car ride back from [place] where he developed right LE DVT. The second was in . He is on lifelong anticoagulation with Xarelto at 20mg daily with a creatinine in November of 0.96. CT of chest in 2019 did not show residual PE.    Lipids were done at [facility] in November and were up a bit on his Atorvastatin 80mg daily; LDL had been 62 in 2018, was up to 87 HDL 51; TG normal. In the past years his LDL had been 90's so I am not sure if the reading last year was just an outlier. Dr. Villarreal suggested that he go on Zetia if LDL is over mid 90's. He has been eating more cheese over the past year.    Echocardiogram was recently ordered by Dr. Henry. His EF is normal, RV could not be assessed and was slightly dilated on previous echo in 2017. There is aortic stenosis without stenosis, MAC and trace MR.    The most notable change is the aortic root measures 4.8cm (3.3 cm on Echo at [facility] in [place] in )  - the ascending aorta on CT scan at [facility] in [place] in  measured 4.0cm X 39cm and root was labeled as normal.  - here on CT in April of this year the ascending aorta measured 4.1cm  -clearly the ascending appears stable but root appears quite enlarged.  -he reports his mother  of a ruptured abdominal aneurysm at  age 92    In addition, atrial fibrillation was noted on echocardiogram done by Dr. Henry in November and on the EKG from October with heart rate of 60. Last prior EKG was done in 2017 at [place] and revealed sinus rhythm. He had called an ambulance in September and reportedly the EKG showed atrial fibrillation but he did not go to ED. This developed in the setting of doing yard work and feeling muscular chest pain. He felt fine in the rhythm with adequate rate control at his follow up with Dr. Henry so AV kendy blockades were not added. He has a history of symptomatic bradycardia on beta-blockers in the past. Hemoglobin and tsh were checked and were normal.    BP was normal on Lisinopril 5mg daily.     JAQUELIN nodule on CT in [place] 3-2017 has been scanned here and has resolved.     Overall he notes mild AM fatigue without exercise intolerance; he had atypica left sided non-exertional chest pain a few months ago that resolved spontaneously. He snores at night and with am fatigue and persistent atrial fibrillation, I suggested a sleep study; he wishes to think about this        Impression/Plan:     1. CAD  S/p RCA and LAD stents    -not on ASA -this was stopped in 2017 when Xarelto was started-would appreciate Dr. Villarreal opinion on this at follow up.  -atypical chest pain in September  -Overdue for stress testing--will schedule exercise nuclear stress test    2. Recurrent DVT  -continue Xarelto lifelong    3. Dilated aortic root with interval increase; dilated ascending aorta stable  -if echo is correct, the aortic root has enlarged from 3.3cm to 4.8cm in 3 years  -check CT angio of chest (normal renal function and no dye allergy)  -see Dr. Villarreal after this to review results and plan    4 persistent atrial fibrillation at least since September  -asymptomatic other than mild am fatigue; no exercise intolerance  -heart rate controlled  -already on Xarelto  -EF preserved  -LAE-likely rhythm restoration would be  unsuccessful and AV kendy blockades are not an option due to past history of symptomatic bradycardia on beta-blockers in the past    5 Dyslipidemia-borderline control  -continue Lipitor 80mg daily  -continue lifestyle modifications    6. Left femoral bruit-stable with distal pulses  -no claudication    7. Snores and now persistent afib; am fatigue  -consider referral to sleep study  -he prefers to complete above tests first and then consider      It has been a pleasure seeing Oliver Baires in follow up today. We will see him back after Chest CT, nuclear stress test  Greater than 50% of this 45 minute visit was spent in counseling    Migue Ramos, MSN, APRN-BC, CNP  Cardiology    Orders Placed This Encounter   Procedures     CT Chest Angio w/o & w Contrast     Follow-Up with Cardiologist     No orders of the defined types were placed in this encounter.    Medications Discontinued During This Encounter   Medication Reason     Multiple Vitamins-Minerals (MULTIVITAMIN ADULTS PO) Stopped by Patient     pantoprazole (PROTONIX) 40 MG EC tablet Stopped by Patient         Encounter Diagnoses   Name Primary?     Paroxysmal atrial fibrillation (H) Yes     Coronary artery disease involving native coronary artery of native heart without angina pectoris      Aortic root dilation (H)        CURRENT MEDICATIONS:  Current Outpatient Medications   Medication Sig Dispense Refill     atorvastatin (LIPITOR) 80 MG tablet Take 1 tablet (80 mg) by mouth daily 90 tablet 3     Cholecalciferol (VITAMIN D PO) Take 1,000 Units by mouth daily        lisinopril (PRINIVIL/ZESTRIL) 5 MG tablet Take 1 tablet (5 mg) by mouth daily 90 tablet 3     nitroGLYcerin (NITROSTAT) 0.4 MG sublingual tablet Place 1 tablet (0.4 mg) under the tongue every 5 minutes as needed for chest pain up to 3 tablets per episode. 60 tablet 1     XARELTO ANTICOAGULANT 20 MG TABS tablet TAKE 1 TABLET (20 MG) BY MOUTH DAILY (WITH DINNER) 90 tablet 3       ALLERGIES      Allergies   Allergen Reactions     Metoprolol      Symptomatic bradycardia       PAST MEDICAL HISTORY:  Past Medical History:   Diagnosis Date     Arthritis     Left knee-L knee injury     AV block      Bradycardia     not on bb due to low HR     CAD (coronary artery disease)     Cath 2010- angioplasty & Xience 3.0x15mm stent to prox LAD; STEMI 10/2004- 3.5 x 33mm Cypher ALEJANDRA to distal RCA, angioplasty to VINCENT, Lcx25%, Lma 25%     ED (erectile dysfunction)      GERD (gastroesophageal reflux disease)      Hyperlipidemia      Hypertension      IBS (irritable bowel syndrome)      Impaired fasting glucose      MI (myocardial infarction) (H)     STEMI 10/2004     Personal history of colonic polyps     Tubular adenomas excised ,      Pneumonia     pleural effusion     Pulmonary emboli (H) 2017    recurrent PE when off anticoag-now lifelong anticoag (unprovoked after car ride)- right leg dvt     Skin cancer      Squamous cell carcinoma        PAST SURGICAL HISTORY:  Past Surgical History:   Procedure Laterality Date     CHOLECYSTECTOMY       HC COLONOSCOPY THRU STOMA, DIAGNOSTIC      Single tubular adenoma excised     HC COLONOSCOPY THRU STOMA, DIAGNOSTIC      Tubular adenoma, hepatic flexure.     HC REMOVAL GALLBLADDER       HEART CATH, ANGIOPLASTY  2010    Angioplasty & Xience 3.0x15mm stent to prox LAD     HEART CATH, ANGIOPLASTY  10/30/2004    3.5x33mm Cypher stent to distal RCA; angioplasty to VINCENT     SURGICAL HISTORY OF -       Angioplasty/stenting of RCA       FAMILY HISTORY:  Family History   Problem Relation Age of Onset     Cancer Father         Pancreatic CA,  age 82     Cardiovascular Mother          of ruptured AAA age 92     Cancer Brother         liver ca       SOCIAL HISTORY:  Social History     Socioeconomic History     Marital status:      Spouse name: None     Number of children: 2     Years of education: None     Highest education level: None   Occupational  History     Employer: RETIRED   Social Needs     Financial resource strain: None     Food insecurity:     Worry: None     Inability: None     Transportation needs:     Medical: None     Non-medical: None   Tobacco Use     Smoking status: Former Smoker     Packs/day: 0.00     Last attempt to quit: 1980     Years since quittin.3     Smokeless tobacco: Never Used   Substance and Sexual Activity     Alcohol use: Yes     Alcohol/week: 0.0 standard drinks     Comment: 2/day     Drug use: No     Sexual activity: Yes   Lifestyle     Physical activity:     Days per week: None     Minutes per session: None     Stress: None   Relationships     Social connections:     Talks on phone: None     Gets together: None     Attends Denominational service: None     Active member of club or organization: None     Attends meetings of clubs or organizations: None     Relationship status: None     Intimate partner violence:     Fear of current or ex partner: None     Emotionally abused: None     Physically abused: None     Forced sexual activity: None   Other Topics Concern     Parent/sibling w/ CABG, MI or angioplasty before 65F 55M? Not Asked      Service Not Asked     Blood Transfusions Not Asked     Caffeine Concern No     Comment: 3 cups in am     Occupational Exposure Not Asked     Hobby Hazards Not Asked     Sleep Concern Not Asked     Stress Concern Not Asked     Weight Concern No     Special Diet No     Back Care Not Asked     Exercise Yes     Comment: 1-2 x week     Bike Helmet Not Asked     Seat Belt Yes     Self-Exams Not Asked   Social History Narrative     None       Review of Systems:  Skin:  Negative       Eyes:  Negative      ENT:  Positive for hearing loss    Respiratory:  Negative       Cardiovascular:    Positive for;exercise intolerance;chest pain    Gastroenterology: Positive for reflux    Genitourinary:  Positive for erectile dysfunction    Musculoskeletal:  Negative      Neurologic:  Negative     "  Psychiatric:  Negative      Heme/Lymph/Imm:  Positive for allergies    Endocrine:  Negative        Physical Exam:  Vitals: /77   Pulse 79   Ht 1.88 m (6' 2\")   Wt 92.7 kg (204 lb 6.4 oz)   BMI 26.24 kg/m      Constitutional:  cooperative, alert and oriented, well developed, well nourished, in no acute distress        Skin:  warm and dry to the touch, no apparent skin lesions or masses noted          Head:  normocephalic, no masses or lesions        Eyes:  pupils equal and round, conjunctivae and lids unremarkable, sclera white, no xanthalasma, EOMS intact, no nystagmus        Lymph:No Cervical lymphadenopathy present     ENT:  no pallor or cyanosis, dentition good hearing aide(s) present      Neck:  carotid pulses are full and equal bilaterally, JVP normal, no carotid bruit        Respiratory:  normal breath sounds, clear to auscultation, normal A-P diameter, normal symmetry, normal respiratory excursion, no use of accessory muscles         Cardiac: regular rhythm;normal S1 and S2       systolic ejection murmur;grade 1        pulses full and equal, no bruits auscultated                                 left femoral bruit (+) soft left femoral bruit    GI:  abdomen soft, non-tender, BS normoactive, no mass, no HSM, no bruits surgical scars      Extremities and Muscular Skeletal:  no deformities, clubbing, cyanosis, erythema observed         trivial at most RLE edema ( prior dvt)    Neurological:  no gross motor deficits        Psych:  Alert and Oriented x 3      Recent Lab Results:  LIPID RESULTS:  Lab Results   Component Value Date    CHOL 160 11/01/2019    HDL 51 11/01/2019    LDL 87 11/01/2019    TRIG 108 11/01/2019    CHOLHDLRATIO 3.1 10/29/2015       LIVER ENZYME RESULTS:  Lab Results   Component Value Date    AST 35 11/01/2019    ALT 43 11/01/2019       CBC RESULTS:  Lab Results   Component Value Date    WBC 4.6 11/01/2019    RBC 4.47 11/01/2019    HGB 14.3 11/01/2019    HCT 43.1 11/01/2019    MCV 96 " 11/01/2019    MCH 32.0 11/01/2019    MCHC 33.2 11/01/2019    RDW 12.8 11/01/2019     11/01/2019       BMP RESULTS:  Lab Results   Component Value Date     11/01/2019    POTASSIUM 4.1 11/01/2019    CHLORIDE 107 11/01/2019    CO2 28 11/01/2019    ANIONGAP 4 11/01/2019     (H) 11/01/2019    BUN 18 11/01/2019    CR 0.96 11/01/2019    GFRESTIMATED 75 11/01/2019    GFRESTBLACK 87 11/01/2019    DALIA 8.8 11/01/2019        A1C RESULTS:  No results found for: A1C    INR RESULTS:  Lab Results   Component Value Date    INR 0.96 10/24/2017    INR 0.98 06/23/2010           CC  No referring provider defined for this encounter.

## 2019-12-31 ENCOUNTER — DOCUMENTATION ONLY (OUTPATIENT)
Dept: CARDIOLOGY | Facility: CLINIC | Age: 79
End: 2019-12-31

## 2019-12-31 ENCOUNTER — OFFICE VISIT (OUTPATIENT)
Dept: CARDIOLOGY | Facility: CLINIC | Age: 79
End: 2019-12-31
Payer: COMMERCIAL

## 2019-12-31 VITALS
WEIGHT: 204.4 LBS | HEIGHT: 74 IN | DIASTOLIC BLOOD PRESSURE: 77 MMHG | BODY MASS INDEX: 26.23 KG/M2 | SYSTOLIC BLOOD PRESSURE: 121 MMHG | HEART RATE: 79 BPM

## 2019-12-31 DIAGNOSIS — I77.810 AORTIC ROOT DILATION (H): ICD-10-CM

## 2019-12-31 DIAGNOSIS — I48.0 PAROXYSMAL ATRIAL FIBRILLATION (H): ICD-10-CM

## 2019-12-31 DIAGNOSIS — I25.10 CORONARY ARTERY DISEASE INVOLVING NATIVE CORONARY ARTERY OF NATIVE HEART WITHOUT ANGINA PECTORIS: Primary | ICD-10-CM

## 2019-12-31 PROCEDURE — 99215 OFFICE O/P EST HI 40 MIN: CPT | Performed by: NURSE PRACTITIONER

## 2019-12-31 ASSESSMENT — MIFFLIN-ST. JEOR: SCORE: 1711.9

## 2019-12-31 NOTE — PROGRESS NOTES
JANETTE    I saw him back  Few issues    New afib with rate controlled-already on xarelto for PE hx x2  Symptomatic charlie in past on BB--heart rate fine  Has been in this since at least September    Hx of cad-overdue for nuke so ordered    Aortic root on echo was 4.8cm??? Was 3.3 in 2017 at [facility] in [place]  Ascending stable at 4.1cm    I ordered nuke, CT of chest and he will see you back to discuss      Thanks  Let me know if you want anything else    Did not do holter as we can see exercise heart rate on nuke and is asymptomatic with rhythm

## 2019-12-31 NOTE — LETTER
12/31/2019    Renan Henry MD  [address] [place] [id number]    RE: Oliver Baires       Dear Colleague,    I had the pleasure of seeing Oliver Baires in the [facility].    History of Present Illness:    Oliver Baires is a 79 year old male followed here by Dr. Sam who returns for his annual visit. In 2004, he had and inferior wall MI with a stent to the RCA and PTCA of the PDA. He has ACS in 2010 and the RCA was open with a 30-40% midvessel stenosis; he had developed a new lesion in his LAD which was stented, Circumflex had 25% stenosis.     Nuclear stress test in November 2014 showed no MI or ischemia. EF is preserved. Nuclear stress test was to be done at the end of 2018 but patient did not come in; letter was sent as well as a voice mail.    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 His first PE occured during a car ride back from [place] where he developed right LE DVT. The second was in October, 2017. He is on lifelong anticoagulation with Xarelto at 20mg daily with a creatinine in November of 0.96. CT of chest in April of 2019 did not show residual PE.    Lipids were done at [facility] in November and were up a bit on his Atorvastatin 80mg daily; LDL had been 62 in 2018, was up to 87 HDL 51; TG normal. In the past years his LDL had been 90's so I am not sure if the reading last year was just an outlier. Dr. Villarreal suggested that he go on Zetia if LDL is over mid 90's. He has been eating more cheese over the past year.    Echocardiogram was recently ordered by Dr. Henry. His EF is normal, RV could not be assessed and was slightly dilated on previous echo in 2017. There is aortic stenosis without stenosis, MAC and trace MR.    The most notable change is the aortic root measures 4.8cm (3.3 cm on Echo at [place] in [place] in 2017)  - the ascending aorta on CT scan at [facility] in [place] in 2017 measured 4.0cm X 39cm and root was labeled  as normal.  - here on CT in April of this year the ascending aorta measured 4.1cm  -clearly the ascending appears stable but root appears quite enlarged.  -he reports his mother  of a ruptured abdominal aneurysm at age 92    In addition, atrial fibrillation was noted on echocardiogram done by Dr. Henry in November and on the EKG from October with heart rate of 60. Last prior EKG was done in  at [place] and revealed sinus rhythm. He had called an ambulance in September and reportedly the EKG showed atrial fibrillation but he did not go to ED. This developed in the setting of doing yard work and feeling muscular chest pain. He felt fine in the rhythm with adequate rate control at his follow up with Dr. Henry so AV kendy blockades were not added. He has a history of symptomatic bradycardia on beta-blockers in the past. Hemoglobin and tsh were checked and were normal.    BP was normal on Lisinopril 5mg daily.     JAQUELIN nodule on CT in [place] 3-2017 has been scanned here and has resolved.     Overall he notes mild AM fatigue without exercise intolerance; he had atypica left sided non-exertional chest pain a few months ago that resolved spontaneously. He snores at night and with am fatigue and persistent atrial fibrillation, I suggested a sleep study; he wishes to think about this        Impression/Plan:     1. CAD  S/p RCA and LAD stents    -not on ASA -this was stopped in  when Xarelto was started-would appreciate Dr. Villarreal opinion on this at follow up.  -atypical chest pain in September  -Overdue for stress testing--will schedule exercise nuclear stress test    2. Recurrent DVT  -continue Xarelto lifelong    3. Dilated aortic root with interval increase; dilated ascending aorta stable  -if echo is correct, the aortic root has enlarged from 3.3cm to 4.8cm in 3 years  -check CT angio of chest (normal renal function and no dye allergy)  -see Dr. Villarreal after this to review results and plan    4 persistent  atrial fibrillation at least since September  -asymptomatic other than mild am fatigue; no exercise intolerance  -heart rate controlled  -already on Xarelto  -EF preserved  -LAE-likely rhythm restoration would be unsuccessful and AV kendy blockades are not an option due to past history of symptomatic bradycardia on beta-blockers in the past    5 Dyslipidemia-borderline control  -continue Lipitor 80mg daily  -continue lifestyle modifications    6. Left femoral bruit-stable with distal pulses  -no claudication    7. Snores and now persistent afib; am fatigue  -consider referral to sleep study  -he prefers to complete above tests first and then consider      It has been a pleasure seeing Oliver Baires in follow up today. We will see him back after Chest CT, nuclear stress test  Greater than 50% of this 45 minute visit was spent in counseling    Migue Ramos, MSN, APRN-BC, CNP  Cardiology    Orders Placed This Encounter   Procedures     CT Chest Angio w/o & w Contrast     Follow-Up with Cardiologist     No orders of the defined types were placed in this encounter.    Medications Discontinued During This Encounter   Medication Reason     Multiple Vitamins-Minerals (MULTIVITAMIN ADULTS PO) Stopped by Patient     pantoprazole (PROTONIX) 40 MG EC tablet Stopped by Patient         Encounter Diagnoses   Name Primary?     Paroxysmal atrial fibrillation (H) Yes     Coronary artery disease involving native coronary artery of native heart without angina pectoris      Aortic root dilation (H)        CURRENT MEDICATIONS:  Current Outpatient Medications   Medication Sig Dispense Refill     atorvastatin (LIPITOR) 80 MG tablet Take 1 tablet (80 mg) by mouth daily 90 tablet 3     Cholecalciferol (VITAMIN D PO) Take 1,000 Units by mouth daily        lisinopril (PRINIVIL/ZESTRIL) 5 MG tablet Take 1 tablet (5 mg) by mouth daily 90 tablet 3     nitroGLYcerin (NITROSTAT) 0.4 MG sublingual tablet Place 1 tablet (0.4 mg) under the  tongue every 5 minutes as needed for chest pain up to 3 tablets per episode. 60 tablet 1     XARELTO ANTICOAGULANT 20 MG TABS tablet TAKE 1 TABLET (20 MG) BY MOUTH DAILY (WITH DINNER) 90 tablet 3       ALLERGIES     Allergies   Allergen Reactions     Metoprolol      Symptomatic bradycardia       PAST MEDICAL HISTORY:  Past Medical History:   Diagnosis Date     Arthritis     Left knee-L knee injury     AV block      Bradycardia     not on bb due to low HR     CAD (coronary artery disease)     Cath 2010- angioplasty & Xience 3.0x15mm stent to prox LAD; STEMI 10/2004- 3.5 x 33mm Cypher ALEJANDRA to distal RCA, angioplasty to VINCENT, Lcx25%, Lma 25%     ED (erectile dysfunction)      GERD (gastroesophageal reflux disease)      Hyperlipidemia      Hypertension      IBS (irritable bowel syndrome)      Impaired fasting glucose      MI (myocardial infarction) (H)     STEMI 10/2004     Personal history of colonic polyps     Tubular adenomas excised ,      Pneumonia 2004    pleural effusion     Pulmonary emboli (H) 2017    recurrent PE when off anticoag-now lifelong anticoag (unprovoked after car ride)- right leg dvt     Skin cancer      Squamous cell carcinoma        PAST SURGICAL HISTORY:  Past Surgical History:   Procedure Laterality Date     CHOLECYSTECTOMY       HC COLONOSCOPY THRU STOMA, DIAGNOSTIC      Single tubular adenoma excised     HC COLONOSCOPY THRU STOMA, DIAGNOSTIC      Tubular adenoma, hepatic flexure.     HC REMOVAL GALLBLADDER       HEART CATH, ANGIOPLASTY  2010    Angioplasty & Xience 3.0x15mm stent to prox LAD     HEART CATH, ANGIOPLASTY  10/30/2004    3.5x33mm Cypher stent to distal RCA; angioplasty to VINCENT     SURGICAL HISTORY OF -       Angioplasty/stenting of RCA       FAMILY HISTORY:  Family History   Problem Relation Age of Onset     Cancer Father         Pancreatic CA,  age 82     Cardiovascular Mother          of ruptured AAA age 92     Cancer Brother         liver ca        SOCIAL HISTORY:  Social History     Socioeconomic History     Marital status:      Spouse name: None     Number of children: 2     Years of education: None     Highest education level: None   Occupational History     Employer: RETIRED   Social Needs     Financial resource strain: None     Food insecurity:     Worry: None     Inability: None     Transportation needs:     Medical: None     Non-medical: None   Tobacco Use     Smoking status: Former Smoker     Packs/day: 0.00     Last attempt to quit: 1980     Years since quittin.3     Smokeless tobacco: Never Used   Substance and Sexual Activity     Alcohol use: Yes     Alcohol/week: 0.0 standard drinks     Comment: 2/day     Drug use: No     Sexual activity: Yes   Lifestyle     Physical activity:     Days per week: None     Minutes per session: None     Stress: None   Relationships     Social connections:     Talks on phone: None     Gets together: None     Attends Orthodoxy service: None     Active member of club or organization: None     Attends meetings of clubs or organizations: None     Relationship status: None     Intimate partner violence:     Fear of current or ex partner: None     Emotionally abused: None     Physically abused: None     Forced sexual activity: None   Other Topics Concern     Parent/sibling w/ CABG, MI or angioplasty before 65F 55M? Not Asked      Service Not Asked     Blood Transfusions Not Asked     Caffeine Concern No     Comment: 3 cups in am     Occupational Exposure Not Asked     Hobby Hazards Not Asked     Sleep Concern Not Asked     Stress Concern Not Asked     Weight Concern No     Special Diet No     Back Care Not Asked     Exercise Yes     Comment: 1-2 x week     Bike Helmet Not Asked     Seat Belt Yes     Self-Exams Not Asked   Social History Narrative     None       Review of Systems:  Skin:  Negative       Eyes:  Negative      ENT:  Positive for hearing loss    Respiratory:  Negative      "  Cardiovascular:    Positive for;exercise intolerance;chest pain    Gastroenterology: Positive for reflux    Genitourinary:  Positive for erectile dysfunction    Musculoskeletal:  Negative      Neurologic:  Negative      Psychiatric:  Negative      Heme/Lymph/Imm:  Positive for allergies    Endocrine:  Negative        Physical Exam:  Vitals: /77   Pulse 79   Ht 1.88 m (6' 2\")   Wt 92.7 kg (204 lb 6.4 oz)   BMI 26.24 kg/m       Constitutional:  cooperative, alert and oriented, well developed, well nourished, in no acute distress        Skin:  warm and dry to the touch, no apparent skin lesions or masses noted          Head:  normocephalic, no masses or lesions        Eyes:  pupils equal and round, conjunctivae and lids unremarkable, sclera white, no xanthalasma, EOMS intact, no nystagmus        Lymph:No Cervical lymphadenopathy present     ENT:  no pallor or cyanosis, dentition good hearing aide(s) present      Neck:  carotid pulses are full and equal bilaterally, JVP normal, no carotid bruit        Respiratory:  normal breath sounds, clear to auscultation, normal A-P diameter, normal symmetry, normal respiratory excursion, no use of accessory muscles         Cardiac: regular rhythm;normal S1 and S2       systolic ejection murmur;grade 1        pulses full and equal, no bruits auscultated                                 left femoral bruit (+) soft left femoral bruit    GI:  abdomen soft, non-tender, BS normoactive, no mass, no HSM, no bruits surgical scars      Extremities and Muscular Skeletal:  no deformities, clubbing, cyanosis, erythema observed         trivial at most RLE edema ( prior dvt)    Neurological:  no gross motor deficits        Psych:  Alert and Oriented x 3      Recent Lab Results:  LIPID RESULTS:  Lab Results   Component Value Date    CHOL 160 11/01/2019    HDL 51 11/01/2019    LDL 87 11/01/2019    TRIG 108 11/01/2019    CHOLHDLRATIO 3.1 10/29/2015       LIVER ENZYME RESULTS:  Lab Results "   Component Value Date    AST 35 11/01/2019    ALT 43 11/01/2019       CBC RESULTS:  Lab Results   Component Value Date    WBC 4.6 11/01/2019    RBC 4.47 11/01/2019    HGB 14.3 11/01/2019    HCT 43.1 11/01/2019    MCV 96 11/01/2019    MCH 32.0 11/01/2019    MCHC 33.2 11/01/2019    RDW 12.8 11/01/2019     11/01/2019       BMP RESULTS:  Lab Results   Component Value Date     11/01/2019    POTASSIUM 4.1 11/01/2019    CHLORIDE 107 11/01/2019    CO2 28 11/01/2019    ANIONGAP 4 11/01/2019     (H) 11/01/2019    BUN 18 11/01/2019    CR 0.96 11/01/2019    GFRESTIMATED 75 11/01/2019    GFRESTBLACK 87 11/01/2019    DALIA 8.8 11/01/2019        A1C RESULTS:  No results found for: A1C    INR RESULTS:  Lab Results   Component Value Date    INR 0.96 10/24/2017    INR 0.98 06/23/2010         Thank you for allowing me to participate in the care of your patient.    Sincerely,     TIBURCIO Nielsen [facility]

## 2019-12-31 NOTE — PATIENT INSTRUCTIONS
CT of chest to assess aorta size    Stress test    See Dr. Villarreal back after the above tests to discuss plan

## 2020-01-02 ENCOUNTER — DOCUMENTATION ONLY (OUTPATIENT)
Dept: CARDIOLOGY | Facility: CLINIC | Age: 80
End: 2020-01-02

## 2020-01-02 NOTE — PROGRESS NOTES
Oliver came in for 18 month follow up and is doing well    PMD had done an echo and aortic root was measured at 4.8cm and had been 3.3 on echo in 2017    His ascending is stable at 4.0    His mom  late from AAA rupture    I ordered a CT angio of chest and a visit back to see what that shows    No aortic valve issues    Hope you agree

## 2020-01-09 ENCOUNTER — HOSPITAL ENCOUNTER (OUTPATIENT)
Dept: CARDIOLOGY | Facility: CLINIC | Age: 80
Discharge: HOME | End: 2020-01-09
Attending: NURSE PRACTITIONER | Admitting: NURSE PRACTITIONER
Payer: COMMERCIAL

## 2020-01-09 DIAGNOSIS — I77.810 AORTIC ROOT DILATION (H): ICD-10-CM

## 2020-01-09 LAB
CREAT BLD-MCNC: 0.9 MG/DL (ref 0.66–1.25)
GFR SERPL CREATININE-BSD FRML MDRD: 81 ML/MIN/{1.73_M2}

## 2020-01-09 PROCEDURE — 25000128 H RX IP 250 OP 636: Performed by: NURSE PRACTITIONER

## 2020-01-09 PROCEDURE — 82565 ASSAY OF CREATININE: CPT

## 2020-01-09 PROCEDURE — 71275 CT ANGIOGRAPHY CHEST: CPT

## 2020-01-09 RX ORDER — METHYLPREDNISOLONE SODIUM SUCCINATE 125 MG/2ML
125 INJECTION, POWDER, LYOPHILIZED, FOR SOLUTION INTRAMUSCULAR; INTRAVENOUS
Status: DISCONTINUED | OUTPATIENT
Start: 2020-01-09 | End: 2020-01-10 | Stop reason: HOSPADM

## 2020-01-09 RX ORDER — DIPHENHYDRAMINE HYDROCHLORIDE 50 MG/ML
25-50 INJECTION INTRAMUSCULAR; INTRAVENOUS
Status: DISCONTINUED | OUTPATIENT
Start: 2020-01-09 | End: 2020-01-10 | Stop reason: HOSPADM

## 2020-01-09 RX ORDER — ONDANSETRON 2 MG/ML
4 INJECTION INTRAMUSCULAR; INTRAVENOUS
Status: DISCONTINUED | OUTPATIENT
Start: 2020-01-09 | End: 2020-01-10 | Stop reason: HOSPADM

## 2020-01-09 RX ORDER — ACYCLOVIR 200 MG/1
0-1 CAPSULE ORAL
Status: DISCONTINUED | OUTPATIENT
Start: 2020-01-09 | End: 2020-01-10 | Stop reason: HOSPADM

## 2020-01-09 RX ORDER — DIPHENHYDRAMINE HCL 25 MG
25 CAPSULE ORAL
Status: DISCONTINUED | OUTPATIENT
Start: 2020-01-09 | End: 2020-01-10 | Stop reason: HOSPADM

## 2020-01-09 RX ORDER — IOPAMIDOL 755 MG/ML
500 INJECTION, SOLUTION INTRAVASCULAR ONCE
Status: COMPLETED | OUTPATIENT
Start: 2020-01-09 | End: 2020-01-09

## 2020-01-09 RX ADMIN — IOPAMIDOL 100 ML: 755 INJECTION, SOLUTION INTRAVENOUS at 08:39

## 2020-01-10 ENCOUNTER — HOSPITAL ENCOUNTER (OUTPATIENT)
Dept: NUCLEAR MEDICINE | Facility: CLINIC | Age: 80
Setting detail: NUCLEAR MEDICINE
Discharge: HOME | End: 2020-01-10
Attending: NURSE PRACTITIONER | Admitting: NURSE PRACTITIONER
Payer: COMMERCIAL

## 2020-01-10 ENCOUNTER — HOSPITAL ENCOUNTER (OUTPATIENT)
Dept: CARDIOLOGY | Facility: CLINIC | Age: 80
Discharge: HOME | End: 2020-01-10
Attending: NURSE PRACTITIONER | Admitting: NURSE PRACTITIONER
Payer: COMMERCIAL

## 2020-01-10 DIAGNOSIS — I25.10 CORONARY ARTERY DISEASE INVOLVING NATIVE CORONARY ARTERY OF NATIVE HEART WITHOUT ANGINA PECTORIS: ICD-10-CM

## 2020-01-10 LAB
CV STRESS MAX HR HE: 150
NUC STRESS EJECTION FRACTION: 72 %
RATE PRESSURE PRODUCT: NORMAL
STRESS ECHO BASELINE DIASTOLIC HE: 76
STRESS ECHO BASELINE HR: 82
STRESS ECHO BASELINE SYSTOLIC BP: 124
STRESS ECHO CALCULATED PERCENT HR: 106 %
STRESS ECHO LAST STRESS DIASTOLIC BP: 68
STRESS ECHO LAST STRESS SYSTOLIC BP: 150
STRESS ECHO POST ESTIMATED WORKLOAD: 7 METS
STRESS ECHO POST EXERCISE DUR MIN: 6 MIN
STRESS ECHO POST EXERCISE DUR SEC: 30 SEC
STRESS ECHO TARGET HR: 141

## 2020-01-10 PROCEDURE — 93018 CV STRESS TEST I&R ONLY: CPT | Performed by: INTERNAL MEDICINE

## 2020-01-10 PROCEDURE — 78452 HT MUSCLE IMAGE SPECT MULT: CPT | Mod: 26 | Performed by: INTERNAL MEDICINE

## 2020-01-10 PROCEDURE — A9502 TC99M TETROFOSMIN: HCPCS | Performed by: NURSE PRACTITIONER

## 2020-01-10 PROCEDURE — 93016 CV STRESS TEST SUPVJ ONLY: CPT | Performed by: INTERNAL MEDICINE

## 2020-01-10 PROCEDURE — 93017 CV STRESS TEST TRACING ONLY: CPT

## 2020-01-10 PROCEDURE — 34300033 ZZH RX 343: Performed by: NURSE PRACTITIONER

## 2020-01-10 PROCEDURE — 78452 HT MUSCLE IMAGE SPECT MULT: CPT

## 2020-01-10 RX ADMIN — TETROFOSMIN 11 MCI.: 1.38 INJECTION, POWDER, LYOPHILIZED, FOR SOLUTION INTRAVENOUS at 10:55

## 2020-01-10 RX ADMIN — TETROFOSMIN 33 MCI.: 1.38 INJECTION, POWDER, LYOPHILIZED, FOR SOLUTION INTRAVENOUS at 12:21

## 2020-01-15 ENCOUNTER — OFFICE VISIT (OUTPATIENT)
Dept: CARDIOLOGY | Facility: CLINIC | Age: 80
End: 2020-01-15
Attending: NURSE PRACTITIONER
Payer: COMMERCIAL

## 2020-01-15 VITALS
HEART RATE: 74 BPM | HEIGHT: 74 IN | SYSTOLIC BLOOD PRESSURE: 124 MMHG | BODY MASS INDEX: 26.09 KG/M2 | DIASTOLIC BLOOD PRESSURE: 72 MMHG | WEIGHT: 203.3 LBS

## 2020-01-15 DIAGNOSIS — I77.810 AORTIC ROOT DILATION (H): ICD-10-CM

## 2020-01-15 DIAGNOSIS — I48.19 PERSISTENT ATRIAL FIBRILLATION (H): Primary | ICD-10-CM

## 2020-01-15 PROCEDURE — 99214 OFFICE O/P EST MOD 30 MIN: CPT | Performed by: INTERNAL MEDICINE

## 2020-01-15 RX ORDER — METOPROLOL SUCCINATE 25 MG/1
12.5 TABLET, EXTENDED RELEASE ORAL DAILY
Qty: 50 TABLET | Refills: 3 | Status: SHIPPED | OUTPATIENT
Start: 2020-01-15 | End: 2021-01-12

## 2020-01-15 ASSESSMENT — MIFFLIN-ST. JEOR: SCORE: 1706.91

## 2020-01-15 NOTE — PROGRESS NOTES
Service Date: 01/15/2020      PRIMARY CARE PHYSICIAN:  MICKY Henry MD      HISTORY OF PRESENT ILLNESS:  I had the pleasure of following up on our mutual patient, Oliver Baires.  He is accompanied by his daughter and wife.  Today's visit ended up being a 45-minute visit, but it was all investigation.  The patient, as you know, has coronary artery disease.  In 2004, he presented with an inferior myocardial infarction.  He had stent to the right coronary artery and PTCA to the PDA.  In 2010, he had an acute coronary syndrome.  The right coronary was open with no more than 30%-40% narrowing.  He developed a new lesion in the LAD, which was stented.  The circumflex had 25% narrowing.  We did do a nuclear stress test now.  It shows inferior infarct and very mild bang-infarction ischemia versus artifact and a relatively well-preserved ejection fraction.  The real issue is his aorta.  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  The other thing is he had a history of sinus bradycardia years ago, and we took him off his beta blocker.  In 2010, the Holter monitor showed an average heart rate of 55 and a low of 42.  He is now in atrial fib.  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  What is unusual is his heart rate at rest in AFib is in the 70s and he is on no AV kendy blocking medicine, so this suggests multilevel conduction disturbance.      I did check his heart rate today and again it is about 70-74 sitting and after having him walk in the reed, it went to 106.  So at this point he does not need a pacemaker, but these are patients who may need pacemakers later in life.  The other thing it tells us is again he is not on any beta blocker medicine and his heart rate is controlled, which is a sign of multivessel disease.  More importantly, however, is his aorta, and this is where the  confusion came in.  This took me about 45 minutes to go through.  I reviewed our 2020  CAT scan and echo, our 2019 CAT scan and echo and the [facility] CAT scan and echo from 2017.  The issue is at [facility] in [place], they were focusing on his DVT and pulmonary emboli and so the aorta studies were not complete.  The echo stated that the aorta was 33 mm - we assume this was the ascending aorta - and the concern is his aorta is now 48, but this is apples and oranges.  It turns out that same time in [facility] in 2017 that the echo said the aorta was 33 mm, the CAT scan at the ascending was 40 mm, so obviously it did not grow 7 mm on the same day.  In addition, some of the CAT scans are referring to the ascending aorta and some are referring to the sinus of Valsalva.  What is clear to me is that from our studies in 2019, the sinus of Valsalva was 48 mm and the ascending aorta was 41, and we got the exact same numbers in 2020 on our CAT scan.  The arch itself is normal at 30 mm.  So in truth, there has been no change in the aortic dimension since 2019 and even at [facility] in 2017, they report the ascending aorta at 40 and we are measuring 41.  We do not have a sinus of Valsalva measurement from [facility].      Overall then, I think he is actually stable.  He does not meet criteria at this point for aortic surgery.  It is not a rapidly expanding aorta.  In fact, it may not be expanding much at all and he does not have any of the other criteria such as a bicuspid valve, etc., that might move us forward.  The one thing though is his mother apparently had an aortic aneurysm, so we certainly would not want to wait until 60 mm, but we might want to consider surgery in the 50-55 mm range.  I think he is good for 1 year, so I am going to have him come back.  I have not scheduled it yet, but he will come back in a year for another CAT scan.  If the CAT scan is stable, we can alternate it with echo to keep the  radiation dose down.  I am going to try very low dose beta blocker since the beta blockers have the best data for preventing aortic enlargement.  Whereas before when he was in sinus rhythm, he was bradycardic.  We took him off the beta blocker and as you saw above, his heart rate is 70s at rest and 106 walking in the reed.      I am going to start him on Toprol-XL 12.5 mg a day.  In 5-7 days after he is on the beta blocker, we will have him wear a 24-hour Holter monitor and then we will see him back and review.  If there is a bradycardia, we will stop the beta blocker and if the heart rate is for the most part controlled, we will keep him on this very low-dose beta blocker and then we will see him back in a year for a CAT scan of the aorta.  I think the family was very appreciative when I went through the data and explained where the issues were coming from, that we are comparing apples and oranges with the [place] studies, etc.  They now fully understand.      Today's visit was 45 minutes, greater than 50% counseling.      Aaron Strange MD      cc:   [place] [address]         AARON STRANGE MD             D: 01/15/2020   T: 01/15/2020   MT: al      Name:     DIANA ZHAO   MRN:      [id number]        Account:      [id number]   :      1940           Service Date: 01/15/2020      Document: [id number]

## 2020-01-15 NOTE — PROGRESS NOTES
HPI and Plan:   See dictation    Orders Placed This Encounter   Procedures     Follow-Up with Cardiac Advanced Practice Provider     Holter Monitor 24 hour Adult Pediatric     Orders Placed This Encounter   Medications     metoprolol succinate ER (TOPROL-XL) 25 MG 24 hr tablet     Sig: Take 0.5 tablets (12.5 mg) by mouth daily     Dispense:  50 tablet     Refill:  3     There are no discontinued medications.      Encounter Diagnoses   Name Primary?     Aortic root dilation (H)      Persistent atrial fibrillation Yes       CURRENT MEDICATIONS:  Current Outpatient Medications   Medication Sig Dispense Refill     atorvastatin (LIPITOR) 80 MG tablet Take 1 tablet (80 mg) by mouth daily 90 tablet 3     Cholecalciferol (VITAMIN D PO) Take 1,000 Units by mouth daily        lisinopril (PRINIVIL/ZESTRIL) 5 MG tablet Take 1 tablet (5 mg) by mouth daily 90 tablet 3     metoprolol succinate ER (TOPROL-XL) 25 MG 24 hr tablet Take 0.5 tablets (12.5 mg) by mouth daily 50 tablet 3     nitroGLYcerin (NITROSTAT) 0.4 MG sublingual tablet Place 1 tablet (0.4 mg) under the tongue every 5 minutes as needed for chest pain up to 3 tablets per episode. 60 tablet 1     XARELTO ANTICOAGULANT 20 MG TABS tablet TAKE 1 TABLET (20 MG) BY MOUTH DAILY (WITH DINNER) 90 tablet 3       ALLERGIES     Allergies   Allergen Reactions     Metoprolol      Symptomatic bradycardia       PAST MEDICAL HISTORY:  Past Medical History:   Diagnosis Date     Aortic root dilatation (H) 12/31/2019    chest CTA     Arthritis     Left knee-L knee injury     AV block      Bradycardia     not on bb due to low HR     CAD (coronary artery disease)     Cath 6/2010- angioplasty & Xience 3.0x15mm stent to prox LAD; STEMI 10/2004- 3.5 x 33mm Cypher ALEJANDRA to distal RCA, angioplasty to VINCENT, Lcx25%, Lma 25%     ED (erectile dysfunction)      GERD (gastroesophageal reflux disease)      Hyperlipidemia      Hypertension      IBS (irritable bowel syndrome)      Impaired fasting glucose       Liver cyst 2017    right lower lobe     MI (myocardial infarction) (H)     STEMI 10/2004     Mild ascending aorta dilatation (H) 2019    chest CTA     Personal history of colonic polyps     Tubular adenomas excised ,      Pneumonia 2004    pleural effusion     Pulmonary emboli (H) 2017    recurrent PE when off anticoag-now lifelong anticoag (unprovoked after car ride)- right leg dvt     Skin cancer      Squamous cell carcinoma        PAST SURGICAL HISTORY:  Past Surgical History:   Procedure Laterality Date     CHOLECYSTECTOMY       HC COLONOSCOPY THRU STOMA, DIAGNOSTIC      Single tubular adenoma excised     HC COLONOSCOPY THRU STOMA, DIAGNOSTIC      Tubular adenoma, hepatic flexure.     HC REMOVAL GALLBLADDER       HEART CATH, ANGIOPLASTY  2010    Angioplasty & Xience 3.0x15mm stent to prox LAD     HEART CATH, ANGIOPLASTY  10/30/2004    3.5x33mm Cypher stent to distal RCA; angioplasty to VINCENT     SURGICAL HISTORY OF -       Angioplasty/stenting of RCA       FAMILY HISTORY:  Family History   Problem Relation Age of Onset     Cancer Father         Pancreatic CA,  age 82     Cardiovascular Mother          of ruptured AAA age 92     Cancer Brother         liver ca       SOCIAL HISTORY:  Social History     Socioeconomic History     Marital status:      Spouse name: None     Number of children: 2     Years of education: None     Highest education level: None   Occupational History     Employer: RETIRED   Social Needs     Financial resource strain: None     Food insecurity:     Worry: None     Inability: None     Transportation needs:     Medical: None     Non-medical: None   Tobacco Use     Smoking status: Former Smoker     Packs/day: 0.00     Last attempt to quit: 1980     Years since quittin.4     Smokeless tobacco: Never Used   Substance and Sexual Activity     Alcohol use: Not Currently     Alcohol/week: 0.0 standard drinks     Comment: 2/day normally - no  "alcohol recently     Drug use: No     Sexual activity: Yes   Lifestyle     Physical activity:     Days per week: None     Minutes per session: None     Stress: None   Relationships     Social connections:     Talks on phone: None     Gets together: None     Attends Yazdanism service: None     Active member of club or organization: None     Attends meetings of clubs or organizations: None     Relationship status: None     Intimate partner violence:     Fear of current or ex partner: None     Emotionally abused: None     Physically abused: None     Forced sexual activity: None   Other Topics Concern     Parent/sibling w/ CABG, MI or angioplasty before 65F 55M? Not Asked      Service Not Asked     Blood Transfusions Not Asked     Caffeine Concern No     Comment: 3 cups in am     Occupational Exposure Not Asked     Hobby Hazards Not Asked     Sleep Concern Not Asked     Stress Concern Not Asked     Weight Concern No     Special Diet No     Back Care Not Asked     Exercise Yes     Comment: 1-2 x week     Bike Helmet Not Asked     Seat Belt Yes     Self-Exams Not Asked   Social History Narrative     None       Review of Systems:  Skin:  Negative     Eyes:  Negative    ENT:  Positive for hearing loss  Respiratory:  Positive for cough  Cardiovascular:    Positive for;chest pain;edema;fatigue  Gastroenterology: Negative    Genitourinary:  Negative    Musculoskeletal:  Negative    Neurologic:  Negative    Psychiatric:  Negative    Heme/Lymph/Imm:  Positive for allergies  Endocrine:  Negative      Physical Exam:  Vitals: /72 (BP Location: Right arm, Patient Position: Sitting, Cuff Size: Adult Regular)   Pulse 74   Ht 1.88 m (6' 2\")   Wt 92.2 kg (203 lb 4.8 oz)   BMI 26.10 kg/m      Constitutional:           Skin:             Head:           Eyes:           Lymph:      ENT:           Neck:           Respiratory:            Cardiac:                                                           GI:       "     Extremities and Muscular Skeletal:                 Neurological:           Psych:         Recent Lab Results:  LIPID RESULTS:  Lab Results   Component Value Date    CHOL 160 11/01/2019    HDL 51 11/01/2019    LDL 87 11/01/2019    TRIG 108 11/01/2019    CHOLHDLRATIO 3.1 10/29/2015       LIVER ENZYME RESULTS:  Lab Results   Component Value Date    AST 35 11/01/2019    ALT 43 11/01/2019       CBC RESULTS:  Lab Results   Component Value Date    WBC 4.6 11/01/2019    RBC 4.47 11/01/2019    HGB 14.3 11/01/2019    HCT 43.1 11/01/2019    MCV 96 11/01/2019    MCH 32.0 11/01/2019    MCHC 33.2 11/01/2019    RDW 12.8 11/01/2019     11/01/2019       BMP RESULTS:  Lab Results   Component Value Date     11/01/2019    POTASSIUM 4.1 11/01/2019    CHLORIDE 107 11/01/2019    CO2 28 11/01/2019    ANIONGAP 4 11/01/2019     (H) 11/01/2019    BUN 18 11/01/2019    CR 0.96 11/01/2019    GFRESTIMATED 81 01/09/2020    GFRESTBLACK >90 01/09/2020    DALIA 8.8 11/01/2019        A1C RESULTS:  No results found for: A1C    INR RESULTS:  Lab Results   Component Value Date    INR 0.96 10/24/2017    INR 0.98 06/23/2010           CC  Migue Ramos APRN CNP  [address]  [place] [address]

## 2020-01-15 NOTE — LETTER
1/15/2020    Renan Henry MD  [address] [address]    RE: Oliver Baires       Dear Colleague,    I had the pleasure of seeing Oliver Baires in the [facility].    HPI and Plan:   See dictation    Orders Placed This Encounter   Procedures     Follow-Up with Cardiac Advanced Practice Provider     Holter Monitor 24 hour Adult Pediatric     Orders Placed This Encounter   Medications     metoprolol succinate ER (TOPROL-XL) 25 MG 24 hr tablet     Sig: Take 0.5 tablets (12.5 mg) by mouth daily     Dispense:  50 tablet     Refill:  3     There are no discontinued medications.      Encounter Diagnoses   Name Primary?     Aortic root dilation (H)      Persistent atrial fibrillation Yes       CURRENT MEDICATIONS:  Current Outpatient Medications   Medication Sig Dispense Refill     atorvastatin (LIPITOR) 80 MG tablet Take 1 tablet (80 mg) by mouth daily 90 tablet 3     Cholecalciferol (VITAMIN D PO) Take 1,000 Units by mouth daily        lisinopril (PRINIVIL/ZESTRIL) 5 MG tablet Take 1 tablet (5 mg) by mouth daily 90 tablet 3     metoprolol succinate ER (TOPROL-XL) 25 MG 24 hr tablet Take 0.5 tablets (12.5 mg) by mouth daily 50 tablet 3     nitroGLYcerin (NITROSTAT) 0.4 MG sublingual tablet Place 1 tablet (0.4 mg) under the tongue every 5 minutes as needed for chest pain up to 3 tablets per episode. 60 tablet 1     XARELTO ANTICOAGULANT 20 MG TABS tablet TAKE 1 TABLET (20 MG) BY MOUTH DAILY (WITH DINNER) 90 tablet 3       ALLERGIES     Allergies   Allergen Reactions     Metoprolol      Symptomatic bradycardia       PAST MEDICAL HISTORY:  Past Medical History:   Diagnosis Date     Aortic root dilatation (H) 12/31/2019    chest CTA     Arthritis     Left knee-L knee injury     AV block      Bradycardia     not on bb due to low HR     CAD (coronary artery disease)     Cath 6/2010- angioplasty & Xience 3.0x15mm stent to prox LAD; STEMI 10/2004- 3.5 x 33mm Cypher ALEJANDRA to distal  RCA, angioplasty to VINCENT, Lcx25%, Lma 25%     ED (erectile dysfunction)      GERD (gastroesophageal reflux disease)      Hyperlipidemia      Hypertension      IBS (irritable bowel syndrome)      Impaired fasting glucose      Liver cyst 2017    right lower lobe     MI (myocardial infarction) (H)     STEMI 10/2004     Mild ascending aorta dilatation (H) 2019    chest CTA     Personal history of colonic polyps     Tubular adenomas excised ,      Pneumonia 2004    pleural effusion     Pulmonary emboli (H) 2017    recurrent PE when off anticoag-now lifelong anticoag (unprovoked after car ride)- right leg dvt     Skin cancer      Squamous cell carcinoma        PAST SURGICAL HISTORY:  Past Surgical History:   Procedure Laterality Date     CHOLECYSTECTOMY       HC COLONOSCOPY THRU STOMA, DIAGNOSTIC      Single tubular adenoma excised     HC COLONOSCOPY THRU STOMA, DIAGNOSTIC      Tubular adenoma, hepatic flexure.     HC REMOVAL GALLBLADDER       HEART CATH, ANGIOPLASTY  2010    Angioplasty & Xience 3.0x15mm stent to prox LAD     HEART CATH, ANGIOPLASTY  10/30/2004    3.5x33mm Cypher stent to distal RCA; angioplasty to VINCENT     SURGICAL HISTORY OF -       Angioplasty/stenting of RCA       FAMILY HISTORY:  Family History   Problem Relation Age of Onset     Cancer Father         Pancreatic CA,  age 82     Cardiovascular Mother          of ruptured AAA age 92     Cancer Brother         liver ca       SOCIAL HISTORY:  Social History     Socioeconomic History     Marital status:      Spouse name: None     Number of children: 2     Years of education: None     Highest education level: None   Occupational History     Employer: RETIRED   Social Needs     Financial resource strain: None     Food insecurity:     Worry: None     Inability: None     Transportation needs:     Medical: None     Non-medical: None   Tobacco Use     Smoking status: Former Smoker     Packs/day: 0.00     Last  "attempt to quit: 1980     Years since quittin.4     Smokeless tobacco: Never Used   Substance and Sexual Activity     Alcohol use: Not Currently     Alcohol/week: 0.0 standard drinks     Comment: 2/day normally - no alcohol recently     Drug use: No     Sexual activity: Yes   Lifestyle     Physical activity:     Days per week: None     Minutes per session: None     Stress: None   Relationships     Social connections:     Talks on phone: None     Gets together: None     Attends Mandaeism service: None     Active member of club or organization: None     Attends meetings of clubs or organizations: None     Relationship status: None     Intimate partner violence:     Fear of current or ex partner: None     Emotionally abused: None     Physically abused: None     Forced sexual activity: None   Other Topics Concern     Parent/sibling w/ CABG, MI or angioplasty before 65F 55M? Not Asked      Service Not Asked     Blood Transfusions Not Asked     Caffeine Concern No     Comment: 3 cups in am     Occupational Exposure Not Asked     Hobby Hazards Not Asked     Sleep Concern Not Asked     Stress Concern Not Asked     Weight Concern No     Special Diet No     Back Care Not Asked     Exercise Yes     Comment: 1-2 x week     Bike Helmet Not Asked     Seat Belt Yes     Self-Exams Not Asked   Social History Narrative     None       Review of Systems:  Skin:  Negative     Eyes:  Negative    ENT:  Positive for hearing loss  Respiratory:  Positive for cough  Cardiovascular:    Positive for;chest pain;edema;fatigue  Gastroenterology: Negative    Genitourinary:  Negative    Musculoskeletal:  Negative    Neurologic:  Negative    Psychiatric:  Negative    Heme/Lymph/Imm:  Positive for allergies  Endocrine:  Negative      Physical Exam:  Vitals: /72 (BP Location: Right arm, Patient Position: Sitting, Cuff Size: Adult Regular)   Pulse 74   Ht 1.88 m (6' 2\")   Wt 92.2 kg (203 lb 4.8 oz)   BMI 26.10 kg/m   "     Constitutional:           Skin:             Head:           Eyes:           Lymph:      ENT:           Neck:           Respiratory:            Cardiac:                                                           GI:           Extremities and Muscular Skeletal:                 Neurological:           Psych:         Recent Lab Results:  LIPID RESULTS:  Lab Results   Component Value Date    CHOL 160 11/01/2019    HDL 51 11/01/2019    LDL 87 11/01/2019    TRIG 108 11/01/2019    CHOLHDLRATIO 3.1 10/29/2015       LIVER ENZYME RESULTS:  Lab Results   Component Value Date    AST 35 11/01/2019    ALT 43 11/01/2019       CBC RESULTS:  Lab Results   Component Value Date    WBC 4.6 11/01/2019    RBC 4.47 11/01/2019    HGB 14.3 11/01/2019    HCT 43.1 11/01/2019    MCV 96 11/01/2019    MCH 32.0 11/01/2019    MCHC 33.2 11/01/2019    RDW 12.8 11/01/2019     11/01/2019       BMP RESULTS:  Lab Results   Component Value Date     11/01/2019    POTASSIUM 4.1 11/01/2019    CHLORIDE 107 11/01/2019    CO2 28 11/01/2019    ANIONGAP 4 11/01/2019     (H) 11/01/2019    BUN 18 11/01/2019    CR 0.96 11/01/2019    GFRESTIMATED 81 01/09/2020    GFRESTBLACK >90 01/09/2020    DALIA 8.8 11/01/2019        A1C RESULTS:  No results found for: A1C    INR RESULTS:  Lab Results   Component Value Date    INR 0.96 10/24/2017    INR 0.98 06/23/2010           CC  TIBURCIO Nielsen CNP  [address]  [place] [address]    Thank you for allowing me to participate in the care of your patient.      Sincerely,     Tommy Villarreal MD     [facility]    cc:   TIBURCIO Nielsen CNP  [address]  [place] [address]

## 2020-01-15 NOTE — LETTER
1/15/2020      Renan Henry MD  [address] [place] [id number]      RE: Oliver Baires       Dear Colleague,    I had the pleasure of seeing Oliver Baires in the [facility].    Service Date: 01/15/2020      PRIMARY CARE PHYSICIAN:  MICKY Henry MD      HISTORY OF PRESENT ILLNESS:  I had the pleasure of following up on our mutual patient, Oliver Baires.  He is accompanied by his daughter and wife.  Today's visit ended up being a 45-minute visit, but it was all investigation.  The patient, as you know, has coronary artery disease.  In 2004, he presented with an inferior myocardial infarction.  He had stent to the right coronary artery and PTCA to the PDA.  In 2010, he had an acute coronary syndrome.  The right coronary was open with no more than 30%-40% narrowing.  He developed a new lesion in the LAD, which was stented.  The circumflex had 25% narrowing.  We did do a nuclear stress test now.  It shows inferior infarct and very mild bang-infarction ischemia versus artifact and a relatively well-preserved ejection fraction.  The real issue is his aorta.  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  The other thing is he had a history of sinus bradycardia years ago, and we took him off his beta blocker.  In 2010, the Holter monitor showed an average heart rate of 55 and a low of 42.  He is now in atrial fib.  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  What is unusual is his heart rate at rest in AFib is in the 70s and he is on no AV kendy blocking medicine, so this suggests multilevel conduction disturbance.      I did check his heart rate today and again it is about 70-74 sitting and after having him walk in the reed, it went to 106.  So at this point he does not need a pacemaker, but these are patients who may need pacemakers later in life.   The other thing it tells us is again he is not on any beta blocker medicine and his heart rate is controlled, which is a sign of multivessel disease.  More importantly, however, is his aorta, and this is where the confusion came in.  This took me about 45 minutes to go through.  I reviewed our 2020  CAT scan and echo, our 2019 CAT scan and echo and the [facility] CAT scan and echo from 2017.  The issue is at [facility] in [place], they were focusing on his DVT and pulmonary emboli and so the aorta studies were not complete.  The echo stated that the aorta was 33 mm - we assume this was the ascending aorta - and the concern is his aorta is now 48, but this is apples and oranges.  It turns out that same time in [facility] in 2017 that the echo said the aorta was 33 mm, the CAT scan at the ascending was 40 mm, so obviously it did not grow 7 mm on the same day.  In addition, some of the CAT scans are referring to the ascending aorta and some are referring to the sinus of Valsalva.  What is clear to me is that from our studies in 2019, the sinus of Valsalva was 48 mm and the ascending aorta was 41, and we got the exact same numbers in 2020 on our CAT scan.  The arch itself is normal at 30 mm.  So in truth, there has been no change in the aortic dimension since 2019 and even at [facility] in 2017, they report the ascending aorta at 40 and we are measuring 41.  We do not have a sinus of Valsalva measurement from [facility].      Overall then, I think he is actually stable.  He does not meet criteria at this point for aortic surgery.  It is not a rapidly expanding aorta.  In fact, it may not be expanding much at all and he does not have any of the other criteria such as a bicuspid valve, etc., that might move us forward.  The one thing though is his mother apparently had an aortic aneurysm, so we certainly would not want to wait until 60 mm, but we might want to consider surgery in the 50-55 mm range.  I think he is  good for 1 year, so I am going to have him come back.  I have not scheduled it yet, but he will come back in a year for another CAT scan.  If the CAT scan is stable, we can alternate it with echo to keep the radiation dose down.  I am going to try very low dose beta blocker since the beta blockers have the best data for preventing aortic enlargement.  Whereas before when he was in sinus rhythm, he was bradycardic.  We took him off the beta blocker and as you saw above, his heart rate is 70s at rest and 106 walking in the reed.      I am going to start him on Toprol-XL 12.5 mg a day.  In 5-7 days after he is on the beta blocker, we will have him wear a 24-hour Holter monitor and then we will see him back and review.  If there is a bradycardia, we will stop the beta blocker and if the heart rate is for the most part controlled, we will keep him on this very low-dose beta blocker and then we will see him back in a year for a CAT scan of the aorta.  I think the family was very appreciative when I went through the data and explained where the issues were coming from, that we are comparing apples and oranges with the [facility] studies, etc.  They now fully understand.      Today's visit was 45 minutes, greater than 50% counseling.      Aaron Strange MD      cc:   MICKY Henry MD   [address] [address]         AARON STRANGE MD             D: 01/15/2020   T: 01/15/2020   MT: al      Name:     DIANA ZHAO   MRN:      [id number]        Account:      [id number]   :      1940           Service Date: 01/15/2020      Document: [id number]         Outpatient Encounter Medications as of 1/15/2020   Medication Sig Dispense Refill     atorvastatin (LIPITOR) 80 MG tablet Take 1 tablet (80 mg) by mouth daily 90 tablet 3     Cholecalciferol (VITAMIN D PO) Take 1,000 Units by mouth daily        lisinopril (PRINIVIL/ZESTRIL) 5 MG tablet Take 1 tablet (5 mg) by mouth daily  90 tablet 3     metoprolol succinate ER (TOPROL-XL) 25 MG 24 hr tablet Take 0.5 tablets (12.5 mg) by mouth daily 50 tablet 3     nitroGLYcerin (NITROSTAT) 0.4 MG sublingual tablet Place 1 tablet (0.4 mg) under the tongue every 5 minutes as needed for chest pain up to 3 tablets per episode. 60 tablet 1     XARELTO ANTICOAGULANT 20 MG TABS tablet TAKE 1 TABLET (20 MG) BY MOUTH DAILY (WITH DINNER) 90 tablet 3     No facility-administered encounter medications on file as of 1/15/2020.        Again, thank you for allowing me to participate in the care of your patient.      Sincerely,    Tommy Villarreal MD     [facility]

## 2020-01-20 ENCOUNTER — TELEPHONE (OUTPATIENT)
Dept: CARDIOLOGY | Facility: CLINIC | Age: 80
End: 2020-01-20

## 2020-01-20 NOTE — TELEPHONE ENCOUNTER
Daughter calling in asking what testing she should have regarding possible familial for Dilated aortic root, and ascending aortic dilatation. She had thought DR Villarreal said echo, but PMD offering US. LATOSHA Vasquez RN

## 2020-01-22 ENCOUNTER — HOSPITAL ENCOUNTER (OUTPATIENT)
Dept: CARDIOLOGY | Facility: CLINIC | Age: 80
Discharge: HOME | End: 2020-01-22
Attending: INTERNAL MEDICINE | Admitting: INTERNAL MEDICINE
Payer: COMMERCIAL

## 2020-01-22 DIAGNOSIS — I48.19 PERSISTENT ATRIAL FIBRILLATION (H): ICD-10-CM

## 2020-01-22 DIAGNOSIS — I77.810 AORTIC ROOT DILATION (H): ICD-10-CM

## 2020-01-22 PROCEDURE — 93227 XTRNL ECG REC<48 HR R&I: CPT | Performed by: INTERNAL MEDICINE

## 2020-01-22 PROCEDURE — 93226 XTRNL ECG REC<48 HR SCAN A/R: CPT

## 2020-01-31 ENCOUNTER — OFFICE VISIT (OUTPATIENT)
Dept: CARDIOLOGY | Facility: CLINIC | Age: 80
End: 2020-01-31
Attending: INTERNAL MEDICINE
Payer: COMMERCIAL

## 2020-01-31 VITALS
HEIGHT: 74 IN | HEART RATE: 57 BPM | SYSTOLIC BLOOD PRESSURE: 104 MMHG | BODY MASS INDEX: 26.18 KG/M2 | WEIGHT: 204 LBS | DIASTOLIC BLOOD PRESSURE: 62 MMHG | OXYGEN SATURATION: 97 %

## 2020-01-31 DIAGNOSIS — R06.83 SNORING: ICD-10-CM

## 2020-01-31 DIAGNOSIS — I25.10 CORONARY ARTERY DISEASE INVOLVING NATIVE CORONARY ARTERY OF NATIVE HEART WITHOUT ANGINA PECTORIS: ICD-10-CM

## 2020-01-31 DIAGNOSIS — I77.810 AORTIC ROOT DILATION (H): Primary | ICD-10-CM

## 2020-01-31 DIAGNOSIS — I48.19 PERSISTENT ATRIAL FIBRILLATION (H): ICD-10-CM

## 2020-01-31 DIAGNOSIS — E78.5 HYPERLIPIDEMIA WITH TARGET LDL LESS THAN 100: ICD-10-CM

## 2020-01-31 PROCEDURE — 99214 OFFICE O/P EST MOD 30 MIN: CPT | Performed by: NURSE PRACTITIONER

## 2020-01-31 ASSESSMENT — MIFFLIN-ST. JEOR: SCORE: 1710.34

## 2020-01-31 NOTE — PATIENT INSTRUCTIONS
1. No medication changes, continue metoprolol   2. Please call with any questions/concerns [phone]  3. Sleep medicine referral placed today, they should be call, otherwise give them a call.

## 2020-01-31 NOTE — PROGRESS NOTES
Cardiology Clinic Progress Note  Oliver Baires MRN# [id number]   YOB: 1940 Age: 79 year old   Primary Cardiologist: Dr. 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 Patient here today for cardiology follow up after holter monitor.      1. Coronary artery disease -  inferior MI in 2004 and underwent successful stenting of his RCA and PTCA to the PDA. In 2010 patient presented with acute coronary syndrome and underwent successful stenting of his LAD, the right coronary artery was open with no more than 30-40% stenosis and circumflex had 25% stenosis. Nuclear stress test in January 2020 showed inferior infarct and very mild bang-infarction ischemia versus artifact and a relatively well-preserved ejection fraction. Stable, no signs/symptoms of ischemia.   - Continue metoprolol and atorvastatin   - Counseled patient on lifestyle modifications    2. Atrial fibrillation- newly diagnosed in September, was already on anticoagulation for his history of DVT. Stable, asymptomatic, rate controlled. History of bradycardia and recently resumed on low dose betablocker therapy for management of his dilated aorta. Holter monitor placed after resuming BB therapy which shows overall controlled heart rate, one episode of bradycardia at night but question if secondary to underlying sleep apnea (see below).    - Holter monitor showed predominant rhythm is atrial fibrillation, average HR was 61 bpm, minimum HR of 35 bpm and a maximum HR of 123 bpm. Maximum R to R interval of 2.804 seconds at 1:12:22. No symptoms were identified during the study.    - Sleep medicine referral placed see below, may be etiology of bradycardia at night.    - Continue  metoprolol XL 12.5mg daily   - Continue xarelto 20mg daily for thromboembolic prophylaxis    3. Dilated aortic root and dilated ascending aorta - CAT of aorta 1/2020 showed aortic root dilation 4.8cm and ascending thoracic aortic dilation 4.1cm. Dr. Villarreal extensively reviewed prior records no change in aortic dimension when compared to 2019 and 2017 imaging. Stable. Plan for continued annual monitoring.    - Continue metoprolol XL 12.5mg daily   - Repeat CAT scan in 1 year in coordination with cardiology follow up.     4. Snoring, atrial fibrillation, bradycardia at night and morning fatigue - reviewed consideration for sleep medicine evaluation.   - sleep medicine referral placed today.     5. Dyslipidemia - lipid panel 11/2019 demonstrated total cholesterol 160, HDL 51, LDL 87 and triglycerides 108.         Changes today: none    Follow up plan:     Sleep medicine referral    Follow up with Dr. 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    Patient follows with Dr. Villarreal for his coronary artery disease. Patient presented with an inferior MI in 2004 and underwent successful stenting of his RCA and PTCA to the PDA. In 2010 patient presented with acute coronary syndrome and underwent successful stenting of his LAD, the right coronary artery was open with no more than 30-40% stenosis and circumflex had 25% stenosis. He was most recently seen in January 2020. Nuclear stress test was completed prior to his visit which showed inferior infarct and very mild bang-infarction ischemia versus artifact and a relatively well-preserved ejection fraction. Patient was also recently noted to be in atrial fibrillation with controlled  "ventricular rate. CAT of aorta 1/2020 showed aortic root dilation 4.8cm and ascending thoracic aortic dilation 4.1cm. Dr. Villarreal extensively reviewed prior records no change in aortic dimension when compared to 2019 and 2017 imaging. During visit with Dr. Villarreal patient was restarted on low dose beta blocker therapy as best data for preventing aortic enlargement. Patients betablocker had been stopped in the past due to bradycardia. Plan for holter monitor 5-7 days after initiation of BB therapy.     Holter monitor results showed predominant rhythm is atrial fibrillation, average HR was 61 bpm, minimum HR of 35 bpm and a maximum HR of 123 bpm. Maximum R to R interval of 2.804 seconds at 1:12:22. No symptoms were identified during the study.     Patient is here today for cardiology follow up, review holter monitor results.     Patient reports feeling good. Patient denies chest pain or chest tightness. Denies shortness of breath at rest. Denies exertional dyspnea. Denies dizziness, lightheadedness or other presyncopal symptoms. Denies tachycardia. Will occasionally feel a \"skipped beat\". Reports snoring in the morning, also wakes up not feeling rested. Denies orthopnea or PND. Denies episodes of bleeding.     Labs from 1/9/20 showed stable kidney fuction. Blood pressure 104/62 and HR 57 in clinic today.    Appetite good. Eating 2-3 meals per week out. Otherwise eating meals at home. Going to the track an walking 30 mins, a few days a week. Does do some strength/streching exercises in the bed each morning.   Working on cutting down on alcohol, down to 1/2 drink per day. Denies tobacco use.         Recent Hospitalizations   none        Social History    , 2 children, lives independently with his wife.    Social History     Socioeconomic History     Marital status:      Spouse name: Not on file     Number of children: 2     Years of education: Not on file     Highest education level: Not on file "   Occupational History     Employer: RETIRED   Social Needs     Financial resource strain: Not on file     Food insecurity:     Worry: Not on file     Inability: Not on file     Transportation needs:     Medical: Not on file     Non-medical: Not on file   Tobacco Use     Smoking status: Former Smoker     Packs/day: 0.00     Last attempt to quit: 1980     Years since quittin.4     Smokeless tobacco: Never Used   Substance and Sexual Activity     Alcohol use: Not Currently     Alcohol/week: 0.0 standard drinks     Comment: 2/day normally - no alcohol recently     Drug use: No     Sexual activity: Yes   Lifestyle     Physical activity:     Days per week: Not on file     Minutes per session: Not on file     Stress: Not on file   Relationships     Social connections:     Talks on phone: Not on file     Gets together: Not on file     Attends [employer] service: Not on file     Active member of club or organization: Not on file     Attends meetings of clubs or organizations: Not on file     Relationship status: Not on file     Intimate partner violence:     Fear of current or ex partner: Not on file     Emotionally abused: Not on file     Physically abused: Not on file     Forced sexual activity: Not on file   Other Topics Concern     Parent/sibling w/ CABG, MI or angioplasty before 65F 55M? Not Asked      Service Not Asked     Blood Transfusions Not Asked     Caffeine Concern No     Comment: 3 cups in am     Occupational Exposure Not Asked     Hobby Hazards Not Asked     Sleep Concern Not Asked     Stress Concern Not Asked     Weight Concern No     Special Diet No     Back Care Not Asked     Exercise Yes     Comment: 1-2 x week     Bike Helmet Not Asked     Seat Belt Yes     Self-Exams Not Asked   Social History Narrative     Not on file            Review of Systems:   Skin:  Negative     Eyes:  Negative    ENT:  Positive for hearing loss  Respiratory:  Positive for cough  Cardiovascular:  Negative  "for;palpitations;chest pain Positive for;edema  Gastroenterology: Negative    Genitourinary:  Negative    Musculoskeletal:  Negative    Neurologic:  Negative    Psychiatric:  Negative    Heme/Lymph/Imm:  Positive for allergies  Endocrine:  Negative           Physical Exam:   Vitals: /62 (BP Location: Right arm, Patient Position: Sitting, Cuff Size: Adult Regular)   Pulse 57   Ht 1.88 m (6' 2.02\")   Wt 92.5 kg (204 lb)   SpO2 97%   BMI 26.18 kg/m     Wt Readings from Last 4 Encounters:   01/31/20 92.5 kg (204 lb)   01/15/20 92.2 kg (203 lb 4.8 oz)   12/31/19 92.7 kg (204 lb 6.4 oz)   10/31/19 90.4 kg (199 lb 3.2 oz)     GEN: well nourished, in no acute distress.  HEENT:  Pupils equal, round. Sclerae nonicteric.   NECK: Supple, no masses appreciated.   C/V:  Irregularly irregular rate and rhythm, no murmur, rub or gallop.    RESP: Respirations are unlabored. Clear to auscultation bilaterally without wheezing, rales, or rhonchi.  GI: Abdomen soft, nontender.  EXTREM: Trace edema in right LE edema.  NEURO: Alert and oriented, cooperative.  SKIN: Warm and dry.        Data:   ECHO 11/2019  1. Moderately dilated aortic root of 4.8 cm. 3.7 cm in last study.  2. Mildly dilated ascending aorta 4.1 cm. Not reported on last study.  3. Trileaflet aortic valve sclerosis without significant stenosis, trace  regurgitation.  4. Normal left ventricular size and systolic function. LVEF 55-60%.  5. No regional wall motion abnormalities.     Compared to previous study dated 7/30/2018, there has been interval  progression in aortic root enlargement.    Nuclear stress test 1/10/20     The nuclear stress test is probably abnormal.     There is a small area of nontransmural infarction in the basal inferior and inferoseptal segment(s) of the left ventricle associated with a mild degree of bang-infarct ischemia. (Diaphragm attenuation and visceral artifact may be contributing to this defect)     Left ventricular function is " normal.     The left ventricular ejection fraction at stress is 72%.     A prior study was conducted on 11/3/2014.  This study has changes noted when compared with the prior study.  similar defect seen( and felt to be artifact)  but no reversibility previously.    CTA Chest 1/9/20  1. Moderate aortic root dilatation with a maximum diameter of 4.8 cm.  2. Mild ascending thoracic aortic dilatation with a maximum diameter  of 4.1 cm.    LIPID RESULTS:  Lab Results   Component Value Date    CHOL 160 11/01/2019    HDL 51 11/01/2019    LDL 87 11/01/2019    TRIG 108 11/01/2019    CHOLHDLRATIO 3.1 10/29/2015     LIVER ENZYME RESULTS:  Lab Results   Component Value Date    AST 35 11/01/2019    ALT 43 11/01/2019     CBC RESULTS:  Lab Results   Component Value Date    WBC 4.6 11/01/2019    RBC 4.47 11/01/2019    HGB 14.3 11/01/2019    HCT 43.1 11/01/2019    MCV 96 11/01/2019    MCH 32.0 11/01/2019    MCHC 33.2 11/01/2019    RDW 12.8 11/01/2019     11/01/2019     BMP RESULTS:  Lab Results   Component Value Date     11/01/2019    POTASSIUM 4.1 11/01/2019    CHLORIDE 107 11/01/2019    CO2 28 11/01/2019    ANIONGAP 4 11/01/2019     (H) 11/01/2019    BUN 18 11/01/2019    CR 0.96 11/01/2019    GFRESTIMATED 81 01/09/2020    GFRESTBLACK >90 01/09/2020    DALIA 8.8 11/01/2019      INR RESULTS:  Lab Results   Component Value Date    INR 0.96 10/24/2017    INR 0.98 06/23/2010            Medications     Current Outpatient Medications   Medication Sig Dispense Refill     atorvastatin (LIPITOR) 80 MG tablet Take 1 tablet (80 mg) by mouth daily 90 tablet 3     Cholecalciferol (VITAMIN D PO) Take 1,000 Units by mouth daily        lisinopril (PRINIVIL/ZESTRIL) 5 MG tablet Take 1 tablet (5 mg) by mouth daily 90 tablet 3     metoprolol succinate ER (TOPROL-XL) 25 MG 24 hr tablet Take 0.5 tablets (12.5 mg) by mouth daily 50 tablet 3     XARELTO ANTICOAGULANT 20 MG TABS tablet TAKE 1 TABLET (20 MG) BY MOUTH DAILY (WITH DINNER) 90  tablet 3     nitroGLYcerin (NITROSTAT) 0.4 MG sublingual tablet Place 1 tablet (0.4 mg) under the tongue every 5 minutes as needed for chest pain up to 3 tablets per episode. (Patient not taking: Reported on 2020) 60 tablet 1          Past Medical History     Past Medical History:   Diagnosis Date     Aortic root dilatation (H) 2019    chest CTA     Arthritis     Left knee-L knee injury     AV block      Bradycardia     not on bb due to low HR     CAD (coronary artery disease)     Cath 2010- angioplasty & Xience 3.0x15mm stent to prox LAD; STEMI 10/2004- 3.5 x 33mm Cypher ALEJANDRA to distal RCA, angioplasty to VINCENT, Lcx25%, Lma 25%     ED (erectile dysfunction)      GERD (gastroesophageal reflux disease)      Hyperlipidemia      Hypertension      IBS (irritable bowel syndrome)      Impaired fasting glucose      Liver cyst 2017    right lower lobe     MI (myocardial infarction) (H)     STEMI 10/2004     Mild ascending aorta dilatation (H) 2019    chest CTA     Personal history of colonic polyps     Tubular adenomas excised ,      Pneumonia     pleural effusion     Pulmonary emboli (H) 2017    recurrent PE when off anticoag-now lifelong anticoag (unprovoked after car ride)- right leg dvt     Skin cancer      Squamous cell carcinoma      Past Surgical History:   Procedure Laterality Date     CHOLECYSTECTOMY       HC COLONOSCOPY THRU STOMA, DIAGNOSTIC      Single tubular adenoma excised     HC COLONOSCOPY THRU STOMA, DIAGNOSTIC      Tubular adenoma, hepatic flexure.     HC REMOVAL GALLBLADDER       HEART CATH, ANGIOPLASTY  2010    Angioplasty & Xience 3.0x15mm stent to prox LAD     HEART CATH, ANGIOPLASTY  10/30/2004    3.5x33mm Cypher stent to distal RCA; angioplasty to VINCENT     SURGICAL HISTORY OF -       Angioplasty/stenting of RCA     Family History   Problem Relation Age of Onset     Cancer Father         Pancreatic CA,  age 82     Cardiovascular Mother          of  ruptured AAA age 92     Cancer Brother         liver ca            Allergies   Metoprolol        [facility]  Pager: [phone]

## 2020-01-31 NOTE — LETTER
1/31/2020    Renan Henry MD  [address] [place] [id number]    RE: Oliver Baires       Dear Colleague,    I had the pleasure of seeing Oliver Baires in the [facility].    Cardiology Clinic Progress Note  Oliver Baires MRN# [id number]   YOB: 1940 Age: 79 year old   Primary Cardiologist: Dr. 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 Patient here today for cardiology follow up after holter monitor.      1. Coronary artery disease -  inferior MI in 2004 and underwent successful stenting of his RCA and PTCA to the PDA. In 2010 patient presented with acute coronary syndrome and underwent successful stenting of his LAD, the right coronary artery was open with no more than 30-40% stenosis and circumflex had 25% stenosis. Nuclear stress test in January 2020 showed inferior infarct and very mild bang-infarction ischemia versus artifact and a relatively well-preserved ejection fraction. Stable, no signs/symptoms of ischemia.   - Continue metoprolol and atorvastatin   - Counseled patient on lifestyle modifications    2. Atrial fibrillation- newly diagnosed in September, was already on anticoagulation for his history of DVT. Stable, asymptomatic, rate controlled. History of bradycardia and recently resumed on low dose betablocker therapy for management of his dilated aorta. Holter monitor placed after resuming BB therapy which shows overall controlled heart rate, one episode of bradycardia at night but question if secondary to underlying sleep apnea (see below).    - Holter monitor showed predominant rhythm is atrial fibrillation, average HR was 61 bpm, minimum HR of 35 bpm and a maximum  HR of 123 bpm. Maximum R to R interval of 2.804 seconds at 1:12:22. No symptoms were identified during the study.    - Sleep medicine referral placed see below, may be etiology of bradycardia at night.    - Continue metoprolol XL 12.5mg daily   - Continue xarelto 20mg daily for thromboembolic prophylaxis    3. Dilated aortic root and dilated ascending aorta - CAT of aorta 1/2020 showed aortic root dilation 4.8cm and ascending thoracic aortic dilation 4.1cm. Dr. Villarreal extensively reviewed prior records no change in aortic dimension when compared to 2019 and 2017 imaging. Stable. Plan for continued annual monitoring.    - Continue metoprolol XL 12.5mg daily   - Repeat CAT scan in 1 year in coordination with cardiology follow up.     4. Snoring, atrial fibrillation, bradycardia at night and morning fatigue - reviewed consideration for sleep medicine evaluation.   - sleep medicine referral placed today.     5. Dyslipidemia - lipid panel 11/2019 demonstrated total cholesterol 160, HDL 51, LDL 87 and triglycerides 108.         Changes today: none    Follow up plan:     Sleep medicine referral    Follow up with Dr. 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    Patient follows with Dr. Villarreal for his coronary artery disease. Patient presented with an inferior MI in 2004 and underwent successful stenting of his RCA and PTCA to the PDA. In 2010 patient presented with acute coronary syndrome and underwent successful stenting of his LAD, the right coronary artery was open with no more than 30-40% stenosis and circumflex had 25% stenosis. He was most recently seen in January 2020. Nuclear stress test was completed prior to his visit which  "showed inferior infarct and very mild bang-infarction ischemia versus artifact and a relatively well-preserved ejection fraction. Patient was also recently noted to be in atrial fibrillation with controlled ventricular rate. CAT of aorta 1/2020 showed aortic root dilation 4.8cm and ascending thoracic aortic dilation 4.1cm. Dr. Villarreal extensively reviewed prior records no change in aortic dimension when compared to 2019 and 2017 imaging. During visit with Dr. Villarreal patient was restarted on low dose beta blocker therapy as best data for preventing aortic enlargement. Patients betablocker had been stopped in the past due to bradycardia. Plan for holter monitor 5-7 days after initiation of BB therapy.     Holter monitor results showed predominant rhythm is atrial fibrillation, average HR was 61 bpm, minimum HR of 35 bpm and a maximum HR of 123 bpm. Maximum R to R interval of 2.804 seconds at 1:12:22. No symptoms were identified during the study.     Patient is here today for cardiology follow up, review holter monitor results.     Patient reports feeling good. Patient denies chest pain or chest tightness. Denies shortness of breath at rest. Denies exertional dyspnea. Denies dizziness, lightheadedness or other presyncopal symptoms. Denies tachycardia. Will occasionally feel a \"skipped beat\". Reports snoring in the morning, also wakes up not feeling rested. Denies orthopnea or PND. Denies episodes of bleeding.     Labs from 1/9/20 showed stable kidney fuction. Blood pressure 104/62 and HR 57 in clinic today.    Appetite good. Eating 2-3 meals per week out. Otherwise eating meals at home. Going to the track an walking 30 mins, a few days a week. Does do some strength/streching exercises in the bed each morning.   Working on cutting down on alcohol, down to 1/2 drink per day. Denies tobacco use.         Recent Hospitalizations   none        Social History    , 2 children, lives independently with his wife.  "   Social History     Socioeconomic History     Marital status:      Spouse name: Not on file     Number of children: 2     Years of education: Not on file     Highest education level: Not on file   Occupational History     Employer: RETIRED   Social Needs     Financial resource strain: Not on file     Food insecurity:     Worry: Not on file     Inability: Not on file     Transportation needs:     Medical: Not on file     Non-medical: Not on file   Tobacco Use     Smoking status: Former Smoker     Packs/day: 0.00     Last attempt to quit: 1980     Years since quittin.4     Smokeless tobacco: Never Used   Substance and Sexual Activity     Alcohol use: Not Currently     Alcohol/week: 0.0 standard drinks     Comment: 2/day normally - no alcohol recently     Drug use: No     Sexual activity: Yes   Lifestyle     Physical activity:     Days per week: Not on file     Minutes per session: Not on file     Stress: Not on file   Relationships     Social connections:     Talks on phone: Not on file     Gets together: Not on file     Attends Muslim service: Not on file     Active member of club or organization: Not on file     Attends meetings of clubs or organizations: Not on file     Relationship status: Not on file     Intimate partner violence:     Fear of current or ex partner: Not on file     Emotionally abused: Not on file     Physically abused: Not on file     Forced sexual activity: Not on file   Other Topics Concern     Parent/sibling w/ CABG, MI or angioplasty before 65F 55M? Not Asked      Service Not Asked     Blood Transfusions Not Asked     Caffeine Concern No     Comment: 3 cups in am     Occupational Exposure Not Asked     Hobby Hazards Not Asked     Sleep Concern Not Asked     Stress Concern Not Asked     Weight Concern No     Special Diet No     Back Care Not Asked     Exercise Yes     Comment: 1-2 x week     Bike Helmet Not Asked     Seat Belt Yes     Self-Exams Not Asked   Social  "History Narrative     Not on file            Review of Systems:   Skin:  Negative     Eyes:  Negative    ENT:  Positive for hearing loss  Respiratory:  Positive for cough  Cardiovascular:  Negative for;palpitations;chest pain Positive for;edema  Gastroenterology: Negative    Genitourinary:  Negative    Musculoskeletal:  Negative    Neurologic:  Negative    Psychiatric:  Negative    Heme/Lymph/Imm:  Positive for allergies  Endocrine:  Negative           Physical Exam:   Vitals: /62 (BP Location: Right arm, Patient Position: Sitting, Cuff Size: Adult Regular)   Pulse 57   Ht 1.88 m (6' 2.02\")   Wt 92.5 kg (204 lb)   SpO2 97%   BMI 26.18 kg/m      Wt Readings from Last 4 Encounters:   01/31/20 92.5 kg (204 lb)   01/15/20 92.2 kg (203 lb 4.8 oz)   12/31/19 92.7 kg (204 lb 6.4 oz)   10/31/19 90.4 kg (199 lb 3.2 oz)     GEN: well nourished, in no acute distress.  HEENT:  Pupils equal, round. Sclerae nonicteric.   NECK: Supple, no masses appreciated.   C/V:  Irregularly irregular rate and rhythm, no murmur, rub or gallop.    RESP: Respirations are unlabored. Clear to auscultation bilaterally without wheezing, rales, or rhonchi.  GI: Abdomen soft, nontender.  EXTREM: Trace edema in right LE edema.  NEURO: Alert and oriented, cooperative.  SKIN: Warm and dry.        Data:   ECHO 11/2019  1. Moderately dilated aortic root of 4.8 cm. 3.7 cm in last study.  2. Mildly dilated ascending aorta 4.1 cm. Not reported on last study.  3. Trileaflet aortic valve sclerosis without significant stenosis, trace  regurgitation.  4. Normal left ventricular size and systolic function. LVEF 55-60%.  5. No regional wall motion abnormalities.     Compared to previous study dated 7/30/2018, there has been interval  progression in aortic root enlargement.    Nuclear stress test 1/10/20     The nuclear stress test is probably abnormal.     There is a small area of nontransmural infarction in the basal inferior and inferoseptal segment(s) " of the left ventricle associated with a mild degree of bang-infarct ischemia. (Diaphragm attenuation and visceral artifact may be contributing to this defect)     Left ventricular function is normal.     The left ventricular ejection fraction at stress is 72%.     A prior study was conducted on 11/3/2014.  This study has changes noted when compared with the prior study.  similar defect seen( and felt to be artifact)  but no reversibility previously.    CTA Chest 1/9/20  1. Moderate aortic root dilatation with a maximum diameter of 4.8 cm.  2. Mild ascending thoracic aortic dilatation with a maximum diameter  of 4.1 cm.    LIPID RESULTS:  Lab Results   Component Value Date    CHOL 160 11/01/2019    HDL 51 11/01/2019    LDL 87 11/01/2019    TRIG 108 11/01/2019    CHOLHDLRATIO 3.1 10/29/2015     LIVER ENZYME RESULTS:  Lab Results   Component Value Date    AST 35 11/01/2019    ALT 43 11/01/2019     CBC RESULTS:  Lab Results   Component Value Date    WBC 4.6 11/01/2019    RBC 4.47 11/01/2019    HGB 14.3 11/01/2019    HCT 43.1 11/01/2019    MCV 96 11/01/2019    MCH 32.0 11/01/2019    MCHC 33.2 11/01/2019    RDW 12.8 11/01/2019     11/01/2019     BMP RESULTS:  Lab Results   Component Value Date     11/01/2019    POTASSIUM 4.1 11/01/2019    CHLORIDE 107 11/01/2019    CO2 28 11/01/2019    ANIONGAP 4 11/01/2019     (H) 11/01/2019    BUN 18 11/01/2019    CR 0.96 11/01/2019    GFRESTIMATED 81 01/09/2020    GFRESTBLACK >90 01/09/2020    DALIA 8.8 11/01/2019      INR RESULTS:  Lab Results   Component Value Date    INR 0.96 10/24/2017    INR 0.98 06/23/2010            Medications     Current Outpatient Medications   Medication Sig Dispense Refill     atorvastatin (LIPITOR) 80 MG tablet Take 1 tablet (80 mg) by mouth daily 90 tablet 3     Cholecalciferol (VITAMIN D PO) Take 1,000 Units by mouth daily        lisinopril (PRINIVIL/ZESTRIL) 5 MG tablet Take 1 tablet (5 mg) by mouth daily 90 tablet 3     metoprolol  succinate ER (TOPROL-XL) 25 MG 24 hr tablet Take 0.5 tablets (12.5 mg) by mouth daily 50 tablet 3     XARELTO ANTICOAGULANT 20 MG TABS tablet TAKE 1 TABLET (20 MG) BY MOUTH DAILY (WITH DINNER) 90 tablet 3     nitroGLYcerin (NITROSTAT) 0.4 MG sublingual tablet Place 1 tablet (0.4 mg) under the tongue every 5 minutes as needed for chest pain up to 3 tablets per episode. (Patient not taking: Reported on 1/31/2020) 60 tablet 1          Past Medical History     Past Medical History:   Diagnosis Date     Aortic root dilatation (H) 12/31/2019    chest CTA     Arthritis     Left knee-L knee injury     AV block      Bradycardia     not on bb due to low HR     CAD (coronary artery disease)     Cath 6/2010- angioplasty & Xience 3.0x15mm stent to prox LAD; STEMI 10/2004- 3.5 x 33mm Cypher ALEJANDRA to distal RCA, angioplasty to VINCENT, Lcx25%, Lma 25%     ED (erectile dysfunction)      GERD (gastroesophageal reflux disease)      Hyperlipidemia      Hypertension      IBS (irritable bowel syndrome)      Impaired fasting glucose      Liver cyst 2017    right lower lobe     MI (myocardial infarction) (H)     STEMI 10/2004     Mild ascending aorta dilatation (H) 12/31/2019    chest CTA     Personal history of colonic polyps     Tubular adenomas excised 1999, 2002     Pneumonia 2004    pleural effusion     Pulmonary emboli (H) 2017    recurrent PE when off anticoag-now lifelong anticoag (unprovoked after car ride)- right leg dvt     Skin cancer      Squamous cell carcinoma      Past Surgical History:   Procedure Laterality Date     CHOLECYSTECTOMY       HC COLONOSCOPY THRU STOMA, DIAGNOSTIC  1999    Single tubular adenoma excised     HC COLONOSCOPY THRU STOMA, DIAGNOSTIC  2002    Tubular adenoma, hepatic flexure.     HC REMOVAL GALLBLADDER       HEART CATH, ANGIOPLASTY  6/23/2010    Angioplasty & Xience 3.0x15mm stent to prox LAD     HEART CATH, ANGIOPLASTY  10/30/2004    3.5x33mm Cypher stent to distal RCA; angioplasty to VINCENT     SURGICAL  HISTORY OF -       Angioplasty/stenting of RCA     Family History   Problem Relation Age of Onset     Cancer Father         Pancreatic CA,  age 82     Cardiovascular Mother          of ruptured AAA age 92     Cancer Brother         liver ca            Allergies   Metoprolol        TIBURCIO John CNP  [facility]  Pager: [phone]      Thank you for allowing me to participate in the care of your patient.    Sincerely,     TIBURCIO John CNP     [facility]

## 2020-02-03 ENCOUNTER — HOSPITAL ENCOUNTER (EMERGENCY)
Facility: CLINIC | Age: 80
Discharge: HOME | End: 2020-02-03
Attending: EMERGENCY MEDICINE | Admitting: EMERGENCY MEDICINE
Payer: COMMERCIAL

## 2020-02-03 VITALS
WEIGHT: 205 LBS | DIASTOLIC BLOOD PRESSURE: 84 MMHG | HEART RATE: 68 BPM | BODY MASS INDEX: 26.31 KG/M2 | HEIGHT: 74 IN | RESPIRATION RATE: 16 BRPM | SYSTOLIC BLOOD PRESSURE: 138 MMHG | OXYGEN SATURATION: 97 % | TEMPERATURE: 97.5 F

## 2020-02-03 DIAGNOSIS — M54.50 CHRONIC BILATERAL LOW BACK PAIN WITHOUT SCIATICA: ICD-10-CM

## 2020-02-03 DIAGNOSIS — G89.29 CHRONIC BILATERAL LOW BACK PAIN WITHOUT SCIATICA: ICD-10-CM

## 2020-02-03 DIAGNOSIS — I71.20 THORACIC AORTIC ANEURYSM WITHOUT RUPTURE (H): ICD-10-CM

## 2020-02-03 LAB
ALBUMIN SERPL-MCNC: 3.5 G/DL (ref 3.4–5)
ALBUMIN UR-MCNC: NEGATIVE MG/DL
ALP SERPL-CCNC: 61 U/L (ref 40–150)
ALT SERPL W P-5'-P-CCNC: 40 U/L (ref 0–70)
ANION GAP SERPL CALCULATED.3IONS-SCNC: 2 MMOL/L (ref 3–14)
APPEARANCE UR: CLEAR
AST SERPL W P-5'-P-CCNC: 34 U/L (ref 0–45)
BASOPHILS # BLD AUTO: 0 10E9/L (ref 0–0.2)
BASOPHILS NFR BLD AUTO: 0.1 %
BILIRUB SERPL-MCNC: 0.5 MG/DL (ref 0.2–1.3)
BILIRUB UR QL STRIP: NEGATIVE
BUN SERPL-MCNC: 15 MG/DL (ref 7–30)
CALCIUM SERPL-MCNC: 9 MG/DL (ref 8.5–10.1)
CHLORIDE SERPL-SCNC: 108 MMOL/L (ref 94–109)
CO2 SERPL-SCNC: 27 MMOL/L (ref 20–32)
COLOR UR AUTO: YELLOW
CREAT SERPL-MCNC: 0.87 MG/DL (ref 0.66–1.25)
DIFFERENTIAL METHOD BLD: ABNORMAL
EOSINOPHIL # BLD AUTO: 0.2 10E9/L (ref 0–0.7)
EOSINOPHIL NFR BLD AUTO: 1.6 %
ERYTHROCYTE [DISTWIDTH] IN BLOOD BY AUTOMATED COUNT: 13.1 % (ref 10–15)
GFR SERPL CREATININE-BSD FRML MDRD: 81 ML/MIN/{1.73_M2}
GLUCOSE SERPL-MCNC: 80 MG/DL (ref 70–99)
GLUCOSE UR STRIP-MCNC: 30 MG/DL
HCT VFR BLD AUTO: 40.6 % (ref 40–53)
HGB BLD-MCNC: 13.7 G/DL (ref 13.3–17.7)
HGB UR QL STRIP: NEGATIVE
IMM GRANULOCYTES # BLD: 0 10E9/L (ref 0–0.4)
IMM GRANULOCYTES NFR BLD: 0.2 %
INTERPRETATION ECG - MUSE: NORMAL
KETONES UR STRIP-MCNC: NEGATIVE MG/DL
LEUKOCYTE ESTERASE UR QL STRIP: NEGATIVE
LYMPHOCYTES # BLD AUTO: 0.9 10E9/L (ref 0.8–5.3)
LYMPHOCYTES NFR BLD AUTO: 9.6 %
MCH RBC QN AUTO: 32.4 PG (ref 26.5–33)
MCHC RBC AUTO-ENTMCNC: 33.7 G/DL (ref 31.5–36.5)
MCV RBC AUTO: 96 FL (ref 78–100)
MONOCYTES # BLD AUTO: 0.8 10E9/L (ref 0–1.3)
MONOCYTES NFR BLD AUTO: 8.8 %
MUCOUS THREADS #/AREA URNS LPF: PRESENT /LPF
NEUTROPHILS # BLD AUTO: 7.4 10E9/L (ref 1.6–8.3)
NEUTROPHILS NFR BLD AUTO: 79.7 %
NITRATE UR QL: NEGATIVE
NRBC # BLD AUTO: 0 10*3/UL
NRBC BLD AUTO-RTO: 0 /100
PH UR STRIP: 5 PH (ref 5–7)
PLATELET # BLD AUTO: 191 10E9/L (ref 150–450)
POTASSIUM SERPL-SCNC: 3.5 MMOL/L (ref 3.4–5.3)
PROT SERPL-MCNC: 7.2 G/DL (ref 6.8–8.8)
RBC # BLD AUTO: 4.23 10E12/L (ref 4.4–5.9)
RBC #/AREA URNS AUTO: 0 /HPF (ref 0–2)
SODIUM SERPL-SCNC: 137 MMOL/L (ref 133–144)
SOURCE: ABNORMAL
SP GR UR STRIP: 1.01 (ref 1–1.03)
TROPONIN I SERPL-MCNC: <0.015 UG/L (ref 0–0.04)
UROBILINOGEN UR STRIP-MCNC: NORMAL MG/DL (ref 0–2)
WBC # BLD AUTO: 9.3 10E9/L (ref 4–11)
WBC #/AREA URNS AUTO: 1 /HPF (ref 0–5)

## 2020-02-03 PROCEDURE — 93005 ELECTROCARDIOGRAM TRACING: CPT

## 2020-02-03 PROCEDURE — 85025 COMPLETE CBC W/AUTO DIFF WBC: CPT | Performed by: NURSE PRACTITIONER

## 2020-02-03 PROCEDURE — 99284 EMERGENCY DEPT VISIT MOD MDM: CPT

## 2020-02-03 PROCEDURE — 80053 COMPREHEN METABOLIC PANEL: CPT | Performed by: NURSE PRACTITIONER

## 2020-02-03 PROCEDURE — 84484 ASSAY OF TROPONIN QUANT: CPT | Performed by: NURSE PRACTITIONER

## 2020-02-03 PROCEDURE — 81001 URINALYSIS AUTO W/SCOPE: CPT | Performed by: NURSE PRACTITIONER

## 2020-02-03 ASSESSMENT — ENCOUNTER SYMPTOMS
FACIAL ASYMMETRY: 0
COUGH: 0
PALPITATIONS: 0
ABDOMINAL PAIN: 0
DIZZINESS: 0
WEAKNESS: 0
DIFFICULTY URINATING: 0
FEVER: 0
SHORTNESS OF BREATH: 0
BACK PAIN: 1
HEADACHES: 0

## 2020-02-03 ASSESSMENT — MIFFLIN-ST. JEOR: SCORE: 1714.62

## 2020-02-03 NOTE — ED TRIAGE NOTES
Chills and shaking today after riding exercise bicycle.  CT scan 1/9/2020. Bilateral lumbar back pain after hitting golf balls.

## 2020-02-03 NOTE — DISCHARGE INSTRUCTIONS
Follow-up with your primary doctor as needed.  Please return to emergency department for any increased or new concerning symptoms as discussed.    Discharge Instructions  Back Pain  You were seen today for back pain. Back pain can have many causes, but most will get better without surgery or other specific treatment. Sometimes there is a herniated ( slipped ) disc. We do not usually do MRI scans to look for these right away, since most herniated discs will get better on their own with time.  Today, we did not find any evidence that your back pain was caused by a serious condition. However, sometimes symptoms develop over time and cannot be found during an emergency visit, so it is very important that you follow up with your primary provider.  Generally, every Emergency Department visit should have a follow-up clinic visit with either a primary or a specialty clinic/provider. Please follow-up as instructed by your emergency provider today.    Return to the Emergency Department if:  You develop a fever with your back pain.   You have weakness or change in sensation in one or both legs.  You lose control of your bowels or bladder, or cannot empty your bladder (cannot pee).  Your pain gets much worse.     Follow-up with your provider:  Unless your pain has completely gone away, please make an appointment with your provider within one week. Most of the routine care for back pain is available in a clinic and not the Emergency Department. You may need further management of your back pain, such as more pain medication, imaging such as an X-ray or MRI, or physical therapy.    What can I do to help myself?  Remain Active -- People are often afraid that they will hurt their back further or delay recovery by remaining active, but this is one of the best things you can do for your back. In fact, staying in bed for a long time to rest is not recommended. Studies have shown that people with low back pain recover faster when they  remain active. Movement helps to bring blood flow to the muscles and relieve muscle spasms as well as preventing loss of muscle strength.  Heat -- Using a heating pad can help with low back pain during the first few weeks. Do not sleep with a heating pad, as you can be burned.   Pain medications - You may take a pain medication such as Tylenol  (acetaminophen), Advil , Motrin  (ibuprofen) or Aleve  (naproxen).  If you were given a prescription for medicine here today, be sure to read all of the information (including the package insert) that comes with your prescription.  This will include important information about the medicine, its side effects, and any warnings that you need to know about.  The pharmacist who fills the prescription can provide more information and answer questions you may have about the medicine.  If you have questions or concerns that the pharmacist cannot address, please call or return to the Emergency Department.   Remember that you can always come back to the Emergency Department if you are not able to see your regular provider in the amount of time listed above, if you get any new symptoms, or if there is anything that worries you.

## 2020-02-03 NOTE — ED AVS SNAPSHOT
Emergency Department  [address] [facility] [address]  Phone:  [phone]  Fax:  [phone]                                    Oliver Baires   MRN: [id number]    Department:   Emergency Department   Date of Visit:  2/3/2020           After Visit Summary Signature Page    I have received my discharge instructions, and my questions have been answered. I have discussed any challenges I see with this plan with the nurse or doctor.    ..........................................................................................................................................  Patient/Patient Representative Signature      ..........................................................................................................................................  Patient Representative Print Name and Relationship to Patient    ..................................................               ................................................  Date                                   Time    ..........................................................................................................................................  Reviewed by Signature/Title    ...................................................              ..............................................  Date                                               Time          22EPIC Rev 08/18

## 2020-02-03 NOTE — ED PROVIDER NOTES
History     Chief Complaint:  Back Pain      HPI   Oliver Baires is a 79 year old male with a history of PE, A. fib, who presents with lower back pain since 1/09/2020 and an episode of shaking and chills this morning.  Patient had a chest CT angiogram scan with contrast to evaluate existing aortic dilatation and now patient is very worried about his kidney function.  Denies any fever, urinary symptoms, chest pain, shortness of breath, or abdominal pain.  Patient states this morning after riding an exercise bike he had an episode of diaphoresis, chills, and shaking.  Denies any chest pain at that time.      Allergies:  Metoprolol     Medications:    Aspirin  Prinvil  Toprol  Nitrostat  Xarelto     Past Medical History:    Aortic root dilatation   Arthritis   AV block   Bradycardia   CAD   ED   GERD    Hyperlipidemia   Hypertension   IBS   Impaired fasting glucose   Liver cyst   MI   Mild ascending aorta dilatation    Personal history of colonic polyps   Pneumonia   Pulmonary emboli   Skin cancer   Squamous cell carcinoma  AV block     Past Surgical History:    Cholecystectomy   Colonoscopy   Heart cath angioplasty    Family History:    Pancreatic cancer  AAA  Liver Cancer    Social History:  Smoking status: former   Alcohol use: not currently   Drug use: no   PCP: Renan Henry  Marital Status:        Review of Systems   Constitutional: Negative for fever.   Respiratory: Negative for cough and shortness of breath.    Cardiovascular: Negative for chest pain, palpitations and leg swelling.   Gastrointestinal: Negative for abdominal pain.   Genitourinary: Negative for difficulty urinating.   Musculoskeletal: Positive for back pain.   Neurological: Negative for dizziness, facial asymmetry, weakness and headaches.   All other systems reviewed and are negative.        Physical Exam     Patient Vitals for the past 24 hrs:   BP Temp Temp src Pulse Heart Rate Resp SpO2 Height Weight   02/03/20 1515 138/84 -- --  "68 67 16 -- -- --   02/03/20 1419 (!) 150/87 97.5  F (36.4  C) Oral 78 -- 16 97 % 1.88 m (6' 2\") 93 kg (205 lb)        Physical Exam  Vitals signs and nursing note reviewed.   Constitutional:       General: He is not in acute distress.     Appearance: Normal appearance. He is not ill-appearing.   HENT:      Head: Atraumatic.      Nose: Nose normal.      Mouth/Throat:      Mouth: Mucous membranes are moist.   Neck:      Musculoskeletal: Normal range of motion.   Cardiovascular:      Rate and Rhythm: Normal rate. Rhythm irregular.      Pulses: Normal pulses.   Pulmonary:      Effort: Pulmonary effort is normal. No respiratory distress.      Breath sounds: Normal breath sounds. No wheezing.   Chest:      Chest wall: No tenderness.   Abdominal:      Palpations: Abdomen is soft.      Tenderness: There is no right CVA tenderness or left CVA tenderness.   Musculoskeletal: Normal range of motion.         General: Tenderness present.      Right lower leg: No edema.      Left lower leg: No edema.      Comments: Bilateral lower back     Skin:     General: Skin is warm and dry.      Capillary Refill: Capillary refill takes less than 2 seconds.   Neurological:      General: No focal deficit present.      Mental Status: He is alert and oriented to person, place, and time.   Psychiatric:         Mood and Affect: Mood normal.         Behavior: Behavior normal.         Thought Content: Thought content normal.         Judgment: Judgment normal.         Emergency Department Course     ECG (15:13:02):  Rate 73 bpm. VA interval *. QRS duration 90. QT/QTc 408/449. P-R-T axes * 40 48. Atrial fibrillation. Abnormal ECG. No significant change compared to EKG dated 10/31/19.   Interpreted at 1518 by Evelio Cuellar MD.     Laboratory:  Labs Ordered and Resulted from Time of ED Arrival Up to the Time of Departure from the ED   CBC WITH PLATELETS DIFFERENTIAL - Abnormal; Notable for the following components:       Result Value    RBC Count " 4.23 (*)     All other components within normal limits   COMPREHENSIVE METABOLIC PANEL - Abnormal; Notable for the following components:    Anion Gap 2 (*)     All other components within normal limits   ROUTINE UA WITH MICROSCOPIC - Abnormal; Notable for the following components:    Glucose Urine 30 (*)     Mucous Urine Present (*)     All other components within normal limits   TROPONIN I   PERIPHERAL IV CATHETER      Interventions:  Medications   0.9% sodium chloride BOLUS (has no administration in time range)        Emergency Department Course:  1438: Nursing notes and vitals reviewed. I performed an exam of the patient as documented above.     IV inserted. Blood drawn. This was sent to the lab for further testing, results above.    1545: I rechecked the patient and discussed the results of his workup thus far.     Findings and plan explained to the Patient.  Dr. Cuellar discussed pain in chest and abdominal CT scan to rule out dissection, but patient is refusing at this time.  He will return for any increased symptoms as discussed.    Impression & Plan      Medical Decision Makin-year-old patient presents emergency department for evaluation of lower back pain.  Differential diagnosis may include pyelonephritis, aortic dissection, or kidney stone.  CT was offered to patient to evaluate for aortic dissection due to history of aortic dilitation and at this time he declines.   We discussed the risks of possible dissection and need for CT, patient verbalized understanding and will return if symptoms increase as discussed.  At this time there are no red flags such as chest pain, shortness of breath, effusion, saddle anesthesia or weakness.  Questions were answered prior to discharge by Dr. Cuellar.  Patient and family verbalized understanding.              Diagnosis:    ICD-10-CM    1. Chronic bilateral low back pain without sciatica M54.5     G89.29    2. Thoracic aortic aneurysm without rupture (H) I71.2         Disposition:  discharged to home    I, Debo Whitney, am serving as a scribe at 2:38 PM on 2/3/2020 to document services personally performed by Lisandra Vizcaino NP based on my observations and the provider's statements to me.       Debo Whitney  2/3/2020    EMERGENCY DEPARTMENT      Emergency Department Attending Supervision Note  2/3/2020  3:35 PM      I evaluated this patient in conjunction with an Advanced Practice Clinician:    APC: Sam    Briefly, the patient presented with a spell that happened this morning.  The patient was on his stationary bike for the third time over the last few weeks doing a mild amount of exercise.  He is only been on the fit bike for about 15 minutes with a pretty low intensity.  This was the third time that he had done this, he is not using it regularly.  He did not have the resistance cranked up and said that overall the resistance was quite low.  He felt a twinge in his back.  He has been having back pain as he started golfing again and hitting balls over the winter and he is felt some discomfort in his low back and attributed it to that initially.  He then got a little sweaty.  He felt a bit flushed and warm.  He then became concerned about possible renal impairment from a recent CT scan with IV dye for his aortic root dilation.  The patient has no history of kidney stone.  He originally had pain in the bilateral mid back area that is not present at this time.  He still is having some mild low back pain in the area of the erector muscles in the paralumbar area.  He does not complain of any midline pain.  He has no history of abdominal aortic aneurysm per his report.  Patient does have a history of atrial fibrillation and he is good control with ventricular response with low-dose metoprolol.  He does have a history of myocardial infarction and 2 coronary stents, this was approximately 15 years ago.  He has had no additional recent problems.    A review of the patient's  CTA of the chest reveals a thoracic aortic root dilation with a maximum of 48 mm.  The patient has had an ultrasound of the abdomen when the last few years and there was no evidence of abdominal aortic aneurysm.    Examination:   General: Resting comfortably on the gurney  Head:  The scalp, face, and head appear normal  Eyes:  The pupils are normal    Conjunctivae and sclera appear normal  ENT:    The nose is normal    Ears/pinnae are normal  Lungs: Clear  CV:  Atrial fibrillation with controlled ventricular response in the 70s  GI:  There is no abdominal pain or pulsatile mass  Neck:  Normal range of motion  MS:  Back: The lumbar spine in the midline is nontender.  The bilateral paravertebral muscles in   the lumbar spine are mildly tender.  There is no CVA tenderness.  There is no upper or   mid back pain currently.  Skin:  No rash or lesions noted.  Neuro: Speech is normal and fluent  Psych:  Awake. Alert.  Normal affect.      Appropriate interactions        Decision making:  This patient presents with an episode that occurred this morning associated with some mild low back pain and transient bilateral flank soreness associated with a sweating spell.  He has a history of coronary artery disease and atrial fibrillation.  He is being followed for thoracic aorta dilation and recently had a CT scan.  They are not planning on any surgical intervention until this gets to 55 mm per his report.  Patient was concerned about the possibility of renal injury from recent CTA with IV dye.  That was ruled out with blood testing in fact his renal function looks excellent.  He has no symptoms in the emergency department.  There is no chest pain, shortness of breath, back pain, or neurologic features in the arms or legs.  Laboratory assessment is grossly normal.  His troponin is negative there is no indication of myocardial infarction.  I talked with the patient about the possibility of thoracic dissection and/or hemorrhage in or  around the aneurysmal dilatation.  I advised that we would need to do a repeat CTA of the chest to fully evaluate these possibilities and that if we found something that this would be surgical.  Patient does not wish to have this evaluated further at this time.  His family was present for the conversation.  He understands the magnitude of the situation if something were not to be diagnosed.  At this juncture the patient would like to go home and rest.  I have told him to avoid the stationary bike for now and also take a few days off of his golf swinging.  Patient was invited to return if he has any redevelopment of chest or back symptoms and at that point the patient should get a repeat CT scan of the chest.  His family understands this as well.        My impression/diagnosis is:    Myofascial lumbar back pain, chronic  Diaphoresis spell this morning, transient, no clear explanation  Known thoracic aortic aneurysmal dilatation with a maximum of 48 mm  History of coronary artery disease and stenting    MD Alyssa Walker Rachael, NP  02/03/20 1912

## 2020-06-12 ENCOUNTER — VIRTUAL VISIT (OUTPATIENT)
Dept: FAMILY MEDICINE | Facility: OTHER | Age: 80
End: 2020-06-12

## 2020-06-12 NOTE — PROGRESS NOTES
"Date: 2020 15:39:59  Clinician: Coral Virk  Clinician NPI: [id number]  Patient: Oliver Baires  Patient : 1940  Patient Address: [address] [place] [id number]  Patient Phone: [phone]  Visit Protocol: URI  Patient Summary:  Oliver is a 80 year old ( : 1940 ) male who initiated a Visit for COVID-19 (Coronavirus) evaluation and screening. When asked the question \"Please sign me up to receive news, health information and promotions from [employer].\", Oliver responded \"No\".    Oliver does not have any symptoms. Oliver denies having wheezing, nausea, teeth pain, ageusia, diarrhea, sore throat, enlarged lymph nodes, malaise, myalgias, anosmia, facial pain or pressure, fever, cough, nasal congestion, vomiting, rhinitis, ear pain, headache, and chills. He also denies having recent facial or sinus surgery in the past 60 days and taking antibiotic medication for the symptoms. He is not experiencing dyspnea.    Pertinent COVID-19 (Coronavirus) information  In the past 14 days, Oliver has not worked in a congregate living setting.   He does not work or volunteer as healthcare worker or a  and does not work or volunteer in a healthcare facility.   Oliver also has not lived in a congregate living setting in the past 14 days. He does not live with a healthcare worker.   Oliver has not had a close contact with a laboratory-confirmed COVID-19 patient within 14 days of symptom onset.   Pertinent medical history  Oliver does not need a return to work/school note.   Weight: 200 lbs   Oliver does not smoke or use smokeless tobacco.   Additional information as reported by the patient (free text): Close contact with 2 year old, touched his drinking class. Yesterday   He seemed normal   Weight: 200 lbs    MEDICATIONS: Xarelto oral, ALLERGIES: metoprolol  Clinician Response:  Dear Oliver,   Based on the details you've shared, you do not seem to be having any symptoms of coronavirus (COVID-19) or mention " any exposures to Lab confirmed + coronavirus.&nbsp; So, you do not need to be tested at this time.&nbsp; If you have been exposed, but are not having symptoms, we are not able to test you through [facility] at this time, but you could check the [place] Department of Health website&nbsp;https://[place].gov/covid19/for[place]/if-sick/testing-locations/index.jsp&nbsp;&nbsp;for other facilities that might be able to test you.     What are the symptoms of COVID-19?  The most common symptoms are cough, fever and trouble breathing. Less common symptoms include headache, body aches, fatigue (feeling very tired), chills, sore throat, stuffy or runny nose, diarrhea (loose poop), loss of taste or smell, belly pain, and nausea or vomiting (feeling sick to your stomach or throwing up).  After 14 days, if you have still don't have symptoms, you likely don't have this virus.  If you develop symptoms, follow these guidelines.  If you're normally healthy: Please start another [facility] visit to report your symptoms. Go to [email or web address].  If you have a serious health problem (like cancer, heart failure, an organ transplant or kidney disease): Call your specialty clinic. Let them know that you might have COVID-19.  Where can I get more information?   [facility] -- About COVID-19: [email or web address]  CDC -- What to Do If You're Sick: www.cdc.gov/coronavirus/2019-ncov/about/steps-when-sick.html  CDC -- Ending Home Isolation: www.cdc.gov/coronavirus/2019-ncov/hcp/disposition-in-home-patients.html  CDC -- Caring for Someone: www.cdc.gov/coronavirus/2019-ncov/if-you-are-sick/care-for-someone.html  [facility] -- Interim Guidance for Hospital Discharge to Home: www.health.[facility].[place]/diseases/coronavirus/hcp/hospdischarge.pdf  [facility] clinical trials (COVID-19 research studies): clinicalaffairs.[facility]/umn-clinical-trials  Below are the COVID-19 hotlines at the [place] Department of Health ([facility]). Interpreters are  available.   For health questions: Call 646-773-8806 or 1-124.404.6820 (7 a.m. to 7 p.m.) For questions about schools and childcare: Call 548-945-0855 or 1-491.234.8150 (7 a.m. to 7 p.m.)     .      COVID-19 (Coronavirus) General Information  Because there is currently no vaccine to prevent infection, the best way to protect yourself is to avoid being exposed to this virus. Common symptoms of COVID-19 include but are not limited to fever, cough, and shortness of breath. These symptoms appear 2-14 days after you are exposed to the virus that causes COVID-19. Click here for more information from the CDC on how to protect yourself.  If you are sick with COVID-19 or suspect you are infected with the virus that causes COVID-19, follow the steps here from the CDC to help prevent the disease from spreading to people in your home and community.  Click here for general information from the CDC on testing.  If you develop any of these emergency warning signs for COVID-19, get medical attention immediately:     Trouble breathing    Persistent pain or pressure in the chest    New confusion or inability to arouse    Bluish lips or face      Call your doctor or clinic before going in. Call [phone] if you have a medical emergency and notify the  you have or think you may have COVID-19.  For more detailed and up to date information on COVID-19 (Coronavirus), please visit the CDC website.   Diagnosis: Worries  Diagnosis ICD: R45.82

## 2020-07-10 ENCOUNTER — VIRTUAL VISIT (OUTPATIENT)
Dept: FAMILY MEDICINE | Facility: OTHER | Age: 80
End: 2020-07-10

## 2020-07-10 NOTE — PROGRESS NOTES
"Date: 07/10/2020 17:37:25  Clinician: Carla Clements  Clinician NPI: [id number]  Patient: Oliver Baires  Patient : 1940  Patient Address: [address] [place] [id number]  Patient Phone: [phone]  Visit Protocol: URI  Patient Summary:  Oliver is a 80 year old ( : 1940 ) male who initiated a Visit for COVID-19 (Coronavirus) evaluation and screening. When asked the question \"Please sign me up to receive news, health information and promotions from [employer].\", Oliver responded \"No\".    Oliver states his symptoms started suddenly 3-4 days ago.   His symptoms consist of ear pain, a headache, and a sore throat.   Symptom details     Sore throat: Oliver reports having moderate throat pain (4-6 on a 10 point pain scale), does not have exudate on his tonsils, and can swallow liquids. He is not sure if the lymph nodes in his neck are enlarged. A rash has not appeared on the skin since the sore throat started.     Headache: He states the headache is mild (1-3 on a 10 point pain scale).      Oliver denies having wheezing, nausea, teeth pain, ageusia, diarrhea, vomiting, rhinitis, malaise, chills, myalgias, anosmia, facial pain or pressure, fever, cough, and nasal congestion. He also denies having recent facial or sinus surgery in the past 60 days, taking antibiotic medication in the past month, and double sickening (worsening symptoms after initial improvement). He is not experiencing dyspnea.   Precipitating events  Within the past week, Oliver has not been exposed to someone with strep throat.   Pertinent COVID-19 (Coronavirus) information  In the past 14 days, Oliver has not worked in a congregate living setting.   He does not work or volunteer as healthcare worker or a  and does not work or volunteer in a healthcare facility.   Oliver also has not lived in a congregate living setting in the past 14 days. He does not live with a healthcare worker.   Oliver has not had a close contact with a " laboratory-confirmed COVID-19 patient within 14 days of symptom onset.   Pertinent medical history  Oliver does not need a return to work/school note.   Weight: 205 lbs   Oliver does not smoke or use smokeless tobacco.   Weight: 205 lbs    MEDICATIONS: metoprolol tartrate-hydrochlorothiazide oral, lisinopril-hydrochlorothiazide oral, atorvastatin oral, Vital-D Rx oral, Xarelto oral, ALLERGIES: metoprolol  Clinician Response:  Dear Oliver,   Your symptoms show that you may have coronavirus (COVID-19). This illness can cause fever, cough and trouble breathing. Many people get a mild case and get better on their own. Some people can get very sick.&nbsp; Your symptoms could also be due to strep throat, so if your covid test is negative and your symptoms continue, consider talking with your primary doctor about getting a strep test.  What should I do?  We would like to test you for this virus.   1. Please call [phone] to schedule your visit. Explain that you were referred by [facility] to have a COVID-19 test. Be ready to share your [facility] visit ID number.  The following will serve as your written order for this COVID Test, ordered by me, for the indication of suspected COVID [Z20.828]: The test will be ordered in [facility], our electronic health record, after you are scheduled. It will show as ordered and authorized by Curtis Casillas MD.  Order: COVID-19 (Coronavirus) PCR for SYMPTOMATIC testing from [facility].      2. When it's time for your COVID test:  Stay at least 6 feet away from others. (If someone will drive you to your test, stay in the backseat, as far away from the  as you can.)   Cover your mouth and nose with a mask, tissue or washcloth.  Go straight to the testing site. Don't make any stops on the way there or back.      3.Starting now: Stay home and away from others (self-isolate) until:   You've had no fever---and no medicine that reduces fever---for 3 full days (72 hours). And...   Your other symptoms have  "gotten better. For example, your cough or breathing has improved. And...   At least 10 days have passed since your symptoms started.       During this time, don't leave the house except for testing or medical care.   Stay in your own room, even for meals. Use your own bathroom if you can.   Stay away from others in your home. No hugging, kissing or shaking hands. No visitors.  Don't go to work, school or anywhere else.    Clean \"high touch\" surfaces often (doorknobs, counters, handles, etc.). Use a household cleaning spray or wipes. You'll find a full list of  on the EPA website: www.epa.gov/pesticide-registration/list-n-disinfectants-use-against-sars-cov-2.   Cover your mouth and nose with a mask, tissue or washcloth to avoid spreading germs.  Wash your hands and face often. Use soap and water.  Caregivers in these groups are at risk for severe illness due to COVID-19:  o People 65 years and older  o People who live in a nursing home or long-term care facility  o People with chronic disease (lung, heart, cancer, diabetes, kidney, liver, immunologic)  o People who have a weakened immune system, including those who:   Are in cancer treatment  Take medicine that weakens the immune system, such as corticosteroids  Had a bone marrow or organ transplant  Have an immune deficiency  Have poorly controlled HIV or AIDS  Are obese (body mass index of 40 or higher)  Smoke regularly   o Caregivers should wear gloves while washing dishes, handling laundry and cleaning bedrooms and bathrooms.  o Use caution when washing and drying laundry: Don't shake dirty laundry, and use the warmest water setting that you can.  o For more tips, go to www.cdc.gov/coronavirus/2019-ncov/downloads/10Things.pdf.       How can I take care of myself?   Get lots of rest. Drink extra fluids (unless a doctor has told you not to).   Take Tylenol (acetaminophen) for fever or pain. If you have liver or kidney problems, ask your family doctor if " it's okay to take Tylenol.   Adults can take either:    650 mg (two 325 mg pills) every 4 to 6 hours, or...   1,000 mg (two 500 mg pills) every 8 hours as needed.    Note: Don't take more than 3,000 mg in one day. Acetaminophen is found in many medicines (both prescribed and over-the-counter medicines). Read all labels to be sure you don't take too much.   For children, check the Tylenol bottle for the right dose. The dose is based on the child's age or weight.    If you have other health problems (like cancer, heart failure, an organ transplant or severe kidney disease): Call your specialty clinic if you don't feel better in the next 2 days.       Know when to call 911. Emergency warning signs include:    Trouble breathing or shortness of breath Pain or pressure in the chest that doesn't go away Feeling confused like you haven't felt before, or not being able to wake up Bluish-colored lips or face.  Where can I get more information?   [facility] -- About COVID-19: [email or web address]   CDC -- What to Do If You're Sick: www.cdc.gov/coronavirus/2019-ncov/about/steps-when-sick.html   CDC -- Ending Home Isolation: www.cdc.gov/coronavirus/2019-ncov/hcp/disposition-in-home-patients.html   [facility] -- Caring for Someone: www.cdc.gov/coronavirus/2019-ncov/if-you-are-sick/care-for-someone.html   [facility] -- Interim Guidance for Hospital Discharge to Home: www.health.[facility].mn.us/diseases/coronavirus/hcp/hospdischarge.pdf   [facility] clinical trials (COVID-19 research studies): clinicalaffairs.[facility].edu/um-clinical-trials    Below are the COVID-19 hotlines at the [place] Department of [facility] ([facility]). Interpreters are available.    For health questions: Call 200-588-9575 or 1-287.144.6484 (7 a.m. to 7 p.m.) For questions about schools and childcare: Call 420-919-2686 or 1-469.823.2688 (7 a.m. to 7 p.m.)    Diagnosis: Acute pharyngitis, unspecified  Diagnosis ICD: J02.9

## 2020-07-11 DIAGNOSIS — Z20.822 SUSPECTED COVID-19 VIRUS INFECTION: Primary | ICD-10-CM

## 2020-07-11 PROCEDURE — U0003 INFECTIOUS AGENT DETECTION BY NUCLEIC ACID (DNA OR RNA); SEVERE ACUTE RESPIRATORY SYNDROME CORONAVIRUS 2 (SARS-COV-2) (CORONAVIRUS DISEASE [COVID-19]), AMPLIFIED PROBE TECHNIQUE, MAKING USE OF HIGH THROUGHPUT TECHNOLOGIES AS DESCRIBED BY CMS-2020-01-R: HCPCS | Performed by: FAMILY MEDICINE

## 2020-07-13 LAB
SARS-COV-2 RNA SPEC QL NAA+PROBE: NOT DETECTED
SPECIMEN SOURCE: NORMAL

## 2020-07-15 ENCOUNTER — TELEPHONE (OUTPATIENT)
Dept: URGENT CARE | Facility: URGENT CARE | Age: 80
End: 2020-07-15

## 2020-07-15 NOTE — LETTER
July 15, 2020        Oliver Baires  [address] [facility] [address]    This letter provides a written record that you were tested for COVID-19 on 7/11/20.       Your result was negative. This means that we didn t find the virus that causes COVID-19 in your sample. A test may show negative when you do actually have the virus. This can happen when the virus is in the early stages of infection, before you feel illness symptoms.    If you have symptoms   Stay home and away from others (self-isolate) until you meet ALL of the guidelines below:    You ve had no fever--and no medicine that reduces fever--for 3 full days (72 hours). And      Your other symptoms have gotten better. For example, your cough or breathing has improved. And     At least 10 days have passed since your symptoms started.    During this time:    Stay home. Don t go to work, school or anywhere else.     Stay in your own room, including for meals. Use your own bathroom if you can.    Stay away from others in your home. No hugging, kissing or shaking hands. No visitors.    Clean  high touch  surfaces often (doorknobs, counters, handles, etc.). Use a household cleaning spray or wipes. You can find a full list on the EPA website at www.epa.gov/pesticide-registration/list-n-disinfectants-use-against-sars-cov-2.    Cover your mouth and nose with a mask, tissue or washcloth to avoid spreading germs.    Wash your hands and face often with soap and water.    Going back to work  Check with your employer for any guidelines to follow for going back to work.    Employers: This document serves as formal notice that your employee tested negative for COVID-19, as of the testing date shown above.

## 2020-07-15 NOTE — TELEPHONE ENCOUNTER
Coronavirus (COVID-19) Notification    Lab Result   Lab test 2019-nCoV rRt-PCR OR SARS-COV-2 PCR    Nasopharyngeal AND/OR Oropharyngeal swab is NEGATIVE for 2019-nCoV RNA [OR] SARS-COV-2 RNA (COVID-19) RNA    Your result was negative. This means that we didn't find the virus that causes COVID-19 in your sample. A test may show negative when you do actually have the virus. This can happen when the virus is in the early stages of infection, before you feel illness symptoms.    If you have symptoms   Stay home and away from others (self-isolate) until you meet ALL of the guidelines below:    You've had no fever--and no medicine that reduces fever--for 3 full days (72 hours). And      Your other symptoms have gotten better. For example, your cough or breathing has improved. And     At least 10 days have passed since your symptoms started.    During this time:    Stay home. Don't go to work, school or anywhere else.     Stay in your own room, including for meals. Use your own bathroom if you can.    Stay away from others in your home. No hugging, kissing or shaking hands. No visitors.    Clean  high touch  surfaces often (doorknobs, counters, handles, etc.). Use a household cleaning spray or wipes. You can find a full list on the EPA website at www.epa.gov/pesticide-registration/list-n-disinfectants-use-against-sars-cov-2.    Cover your mouth and nose with a mask, tissue or washcloth to avoid spreading germs.    Wash your hands and face often with soap and water.    Going back to work  Check with your employer for any guidelines to follow for going back to work.  You are sent a letter for your Employer which will serve as formal document notice that you, the employee, tested negative for COVID-19, as of the testing date shown above.    If your symptoms worsen or other concerning symptoms, contact PCP, oncare or consider returning to Emergency Dept.    Where can I get more information?    [facility]:  [email or web address]    Coronavirus Basics: www.health.[facility]./diseases/coronavirus/basics.html    [facility] Hotline ([phone])  Letter Sent  {Name]  Katharine Millan LPN

## 2020-09-21 DIAGNOSIS — I26.99 RECURRENT PULMONARY EMBOLISM (H): ICD-10-CM

## 2020-09-22 NOTE — TELEPHONE ENCOUNTER
Routing refill request to provider for review/approval because:  Labs not current:  Cr Cl, Serum Cr  Patient needs to be seen because:  Due for 6 month follow-up

## 2020-10-06 ENCOUNTER — OFFICE VISIT (OUTPATIENT)
Dept: DERMATOLOGY | Facility: CLINIC | Age: 80
End: 2020-10-06
Payer: COMMERCIAL

## 2020-10-06 VITALS — SYSTOLIC BLOOD PRESSURE: 109 MMHG | DIASTOLIC BLOOD PRESSURE: 71 MMHG | HEART RATE: 83 BPM | OXYGEN SATURATION: 98 %

## 2020-10-06 DIAGNOSIS — D48.5 NEOPLASM OF UNCERTAIN BEHAVIOR OF SKIN: Primary | ICD-10-CM

## 2020-10-06 DIAGNOSIS — L81.4 LENTIGO: ICD-10-CM

## 2020-10-06 DIAGNOSIS — D22.9 NEVUS: ICD-10-CM

## 2020-10-06 DIAGNOSIS — D18.01 ANGIOMA OF SKIN: ICD-10-CM

## 2020-10-06 DIAGNOSIS — L57.0 ACTINIC KERATOSIS: ICD-10-CM

## 2020-10-06 DIAGNOSIS — L82.1 SEBORRHEIC KERATOSIS: ICD-10-CM

## 2020-10-06 PROCEDURE — 17003 DESTRUCT PREMALG LES 2-14: CPT | Mod: 59 | Performed by: PHYSICIAN ASSISTANT

## 2020-10-06 PROCEDURE — 88305 TISSUE EXAM BY PATHOLOGIST: CPT | Performed by: DERMATOLOGY

## 2020-10-06 PROCEDURE — 17000 DESTRUCT PREMALG LESION: CPT | Mod: 59 | Performed by: PHYSICIAN ASSISTANT

## 2020-10-06 PROCEDURE — 99214 OFFICE O/P EST MOD 30 MIN: CPT | Mod: 25 | Performed by: PHYSICIAN ASSISTANT

## 2020-10-06 PROCEDURE — 11102 TANGNTL BX SKIN SINGLE LES: CPT | Performed by: PHYSICIAN ASSISTANT

## 2020-10-06 NOTE — PATIENT INSTRUCTIONS
Wound Care Instructions     FOR SUPERFICIAL WOUNDS     L sternum    [facility] [phone]    [facility] [phone]                       AFTER 24 HOURS YOU SHOULD REMOVE THE BANDAGE AND BEGIN DAILY DRESSING CHANGES AS FOLLOWS:     1) Remove Dressing.     2) Clean and dry the area with tap water using a Q-tip or sterile gauze pad.     3) Apply Vaseline, Aquaphor, Polysporin ointment or Bacitracin ointment over entire wound.  Do NOT use Neosporin ointment.     4) Cover the wound with a band-aid, or a sterile non-stick gauze pad and micropore paper tape      REPEAT THESE INSTRUCTIONS AT LEAST ONCE A DAY UNTIL THE WOUND HAS COMPLETELY HEALED.    It is an old wives tale that a wound heals better when it is exposed to air and allowed to dry out. The wound will heal faster with a better cosmetic result if it is kept moist with ointment and covered with a bandage.    **Do not let the wound dry out.**      Supplies Needed:      *Cotton tipped applicators (Q-tips)    *Polysporin Ointment or Bacitracin Ointment (NOT NEOSPORIN)    *Band-aids or non-stick gauze pads and micropore paper tape.      PATIENT INFORMATION:    During the healing process you will notice a number of changes. All wounds develop a small halo of redness surrounding the wound.  This means healing is occurring. Severe itching with extensive redness usually indicates sensitivity to the ointment or bandage tape used to dress the wound.  You should call our office if this develops.      Swelling  and/or discoloration around your surgical site is common, particularly when performed around the eye.    All wounds normally drain.  The larger the wound the more drainage there will be.  After 7-10 days, you will notice the wound beginning to shrink and new skin will begin to grow.  The wound is healed when you can see skin has formed over the entire area.  A healed wound has a healthy, shiny look to the surface and is red to dark  pink in color to normalize.  Wounds may take approximately 4-6 weeks to heal.  Larger wounds may take 6-8 weeks.  After the wound is healed you may discontinue dressing changes.    You may experience a sensation of tightness as your wound heals. This is normal and will gradually subside.    Your healed wound may be sensitive to temperature changes. This sensitivity improves with time, but if you re having a lot of discomfort, try to avoid temperature extremes.    Patients frequently experience itching after their wound appears to have healed because of the continue healing under the skin.  Plain Vaseline will help relieve the itching.        POSSIBLE COMPLICATIONS    BLEEDIN. Leave the bandage in place.  2. Use tightly rolled up gauze or a cloth to apply direct pressure over the bandage for 30  minutes.  3. Reapply pressure for an additional 30 minutes if necessary  4. Use additional gauze and tape to maintain pressure once the bleeding has stopped.        WOUND CARE INSTRUCTIONS   FOR CRYOSURGERY   Forehead and left ear    This area treated with liquid nitrogen should form a blister (areas treated may or may not blister-skin may just turn dark and slough off). You do not need to bandage the area unless a blister forms and breaks (which may be a few days). When the blister breaks, begin daily dressing changes as follows:  1) Clean and dry the area with tap water using clean Q-tip or sterile gauze pad.   2) Apply Polysporin ointment or Bacitracin ointment over entire wound. Do NOT use Neosporin ointment.   3) Cover the wound with a band-aid or sterile non-stick gauze pad and micropore paper tape.   REPEAT THESE INSTRUCTIONS AT LEAST ONCE A DAY UNTIL THE WOUND HAS COMPLETELY HEALED.   It is an old wives tale that a wound heals better when it is exposed to air and allowed to dry out. The wound will heal faster with a better cosmetic result if it is kept moist with ointment and covered with a bandage.   Do not  let the wound dry out.   IMPORTANT INFORMATION ON REVERSE SIDE   Supplies Needed:   *Cotton tipped applicators (Q-tips)   *Polysporin ointment or Bacitracin ointment (NOT NEOSPORIN)   *Band-aids, or non stick gauze pads and micropore paper tape   PATIENT INFORMATION   During the healing process you will notice a number of changes. All wounds develop a small halo of redness surrounding the wound. This means healing is occurring. Severe itching with extensive redness usually indicates sensitivity to the ointment or bandage tape used to dress the wound. You should call our office if this develops.   Swelling and/or discoloration around your surgical site is common, particularly when performed around the eye.   All wounds normally drain. The larger the wound the more drainage there will be. After 7-10 days, you will notice the wound beginning to shrink and new skin will begin to grow. The wound is healed when you can see skin has formed over the entire area. A healed wound has a healthy, shiny look to the surface and is red to dark pink in color to normalize. Wounds may take approximately 4-6 weeks to heal. Larger wounds may take 6-8 weeks. After the wound is healed you may discontinue dressing changes.   You may experience a sensation of tightness as your wound heals. This is normal and will gradually subside.   Your healed wound may be sensitive to temperature changes. This sensitivity improves with time, but if you re having a lot of discomfort, try to avoid temperature extremes.   Patients frequently experience itching after their wound appears to have healed because of the continue healing under the skin. Plain Vaseline will help relieve the itching.

## 2020-10-06 NOTE — PROGRESS NOTES
HPI:   Chief complaint: Oliver Baires is a 80 year old male who presents for Full skin cancer screening to rule out skin cancer.  Last Skin Exam: 1 year ago     1st Baseline: no  Personal HX of Skin Cancer: SCC right lower leg 2018   Personal HX of Malignant Melanoma: none   Family HX of Skin Cancer / Malignant Melanoma: none  Personal HX of Atypical Moles: none  Risk factors: sun exposure   New / Changing lesions: yes, spot on the chest that is new.   Social History: he enjoys gardening. Wife has dementia and is starting to decline.  On review of systems, there are no further skin complaints, patient is feeling otherwise well.  See patient intake sheet.  ROS of the following were done and are negative: Constitutional, Eyes, Ears, Nose,   Mouth, Throat, Cardiovascular, Respiratory, GI, Genitourinary, Musculoskeletal,   Psychiatric, Endocrine, Allergic/Immunologic.    HPI, past medical history, social history, allergies, medications reviewed as of October 6, 2020    PHYSICAL EXAM:   /71   Pulse 83   SpO2 98%   Skin exam performed as follows: Type 2 skin. Mood appropriate  Alert and Oriented X 3. Well developed, well nourished in no distress.  General appearance: Normal  Head including face: Normal  Eyes: conjunctiva and lids: Normal  Mouth: Lips, teeth, gums: Normal  Neck: Normal  Chest-breast/axillae: Normal  Back: Normal  Spleen and liver: Normal  Cardiovascular: Exam of peripheral vascular system by observation for swelling, varicosities, edema: Normal  Genitalia: groin, buttocks: Normal  Extremities: digits/nails (clubbing): Normal  Eccrine and Apocrine glands: Normal  Right upper extremity: Normal  Left upper extremity: Normal  Right lower extremity: Normal  Left lower extremity: Normal  Skin: Scalp and body hair: See below    Pt deferred exam of breasts, groin, buttocks: No    Other physical findings:  1. Multiple pigmented macules on extremities and trunk  2. Multiple pigmented macules on face, trunk  and extremities  3. Multiple vascular papules on trunk, arms and legs  4. Multiple scattered keratotic plaques  5. Pink scaly papules on the left helix x 1, right forehead x 3.  6. 5 mm pink papule on the left sternum      Except as noted above, no other signs of skin cancer or melanoma.     ASSESSMENT/PLAN:   Benign Full skin cancer screening today.     Patient with history of SCC  Advised on monthly self exams and 1 year  Patient Education: Appropriate brochures given.    Multiple benign appearing nevi on arms, legs and trunk. Discussed ABCDEs of melanoma and sunscreen.   Multiple lentigos on arms, legs and trunk. Advised benign, no treatment needed.  Multiple scattered angiomas. Advised benign, no treatment needed.   Seborrheic keratosis on arms, legs and trunk. Advised benign, no treatment needed.  Actinic keratosis on the left helix x 1, right forehead x 3.  As precancerous, cryosurgery performed. Advised on blistering and post-op care. Advised if not resolved in 1-2 months to return for evaluation  R/o SCC on the left sternum. Shave biopsy performed.  Area cleaned and anesthetized with 1% lidocaine with epinephrine.  Dermablade used to remove the lesion and sent to pathology. Bleeding was cauterized. Pt tolerated procedure well with no complications.       Follow-up: yearly FSE/PRN sooner    1.) Patient was asked about new and changing moles. YES  2.) Patient received a complete physical skin examination: YES  3.) Patient was counseled to perform a monthly self skin examination: YES  Scribed By: Michelle Major, MS, P[place]

## 2020-10-06 NOTE — LETTER
10/6/2020         RE: Oliver Baires  [address]  [facility] [address]        Dear Colleague,    Thank you for referring your patient, Oliver Baires, to the [facility]. Please see a copy of my visit note below.    HPI:   Chief complaint: Oliver Baires is a 80 year old male who presents for Full skin cancer screening to rule out skin cancer.  Last Skin Exam: 1 year ago     1st Baseline: no  Personal HX of Skin Cancer: SCC right lower leg 2018   Personal HX of Malignant Melanoma: none   Family HX of Skin Cancer / Malignant Melanoma: none  Personal HX of Atypical Moles: none  Risk factors: sun exposure   New / Changing lesions: yes, spot on the chest that is new.   Social History: he enjoys gardening. Wife has dementia and is starting to decline.  On review of systems, there are no further skin complaints, patient is feeling otherwise well.  See patient intake sheet.  ROS of the following were done and are negative: Constitutional, Eyes, Ears, Nose,   Mouth, Throat, Cardiovascular, Respiratory, GI, Genitourinary, Musculoskeletal,   Psychiatric, Endocrine, Allergic/Immunologic.    HPI, past medical history, social history, allergies, medications reviewed as of October 6, 2020    PHYSICAL EXAM:   /71   Pulse 83   SpO2 98%   Skin exam performed as follows: Type 2 skin. Mood appropriate  Alert and Oriented X 3. Well developed, well nourished in no distress.  General appearance: Normal  Head including face: Normal  Eyes: conjunctiva and lids: Normal  Mouth: Lips, teeth, gums: Normal  Neck: Normal  Chest-breast/axillae: Normal  Back: Normal  Spleen and liver: Normal  Cardiovascular: Exam of peripheral vascular system by observation for swelling, varicosities, edema: Normal  Genitalia: groin, buttocks: Normal  Extremities: digits/nails (clubbing): Normal  Eccrine and Apocrine glands: Normal  Right upper extremity: Normal  Left upper extremity: Normal  Right lower extremity:  Normal  Left lower extremity: Normal  Skin: Scalp and body hair: See below    Pt deferred exam of breasts, groin, buttocks: No    Other physical findings:  1. Multiple pigmented macules on extremities and trunk  2. Multiple pigmented macules on face, trunk and extremities  3. Multiple vascular papules on trunk, arms and legs  4. Multiple scattered keratotic plaques  5. Pink scaly papules on the left helix x 1, right forehead x 3.  6. 5 mm pink papule on the left sternum      Except as noted above, no other signs of skin cancer or melanoma.     ASSESSMENT/PLAN:   Benign Full skin cancer screening today.     Patient with history of SCC  Advised on monthly self exams and 1 year  Patient Education: Appropriate brochures given.    Multiple benign appearing nevi on arms, legs and trunk. Discussed ABCDEs of melanoma and sunscreen.   Multiple lentigos on arms, legs and trunk. Advised benign, no treatment needed.  Multiple scattered angiomas. Advised benign, no treatment needed.   Seborrheic keratosis on arms, legs and trunk. Advised benign, no treatment needed.  Actinic keratosis on the left helix x 1, right forehead x 3.  As precancerous, cryosurgery performed. Advised on blistering and post-op care. Advised if not resolved in 1-2 months to return for evaluation  R/o SCC on the left sternum. Shave biopsy performed.  Area cleaned and anesthetized with 1% lidocaine with epinephrine.  Dermablade used to remove the lesion and sent to pathology. Bleeding was cauterized. Pt tolerated procedure well with no complications.       Follow-up: yearly FSE/PRN sooner    1.) Patient was asked about new and changing moles. YES  2.) Patient received a complete physical skin examination: YES  3.) Patient was counseled to perform a monthly self skin examination: YES  Scribed By: Michelle Major, MS, CHRIS        Again, thank you for allowing me to participate in the care of your patient.        Sincerely,        Michelle Major PA-C

## 2020-10-09 ENCOUNTER — TELEPHONE (OUTPATIENT)
Dept: DERMATOLOGY | Facility: CLINIC | Age: 80
End: 2020-10-09

## 2020-10-09 LAB — COPATH REPORT: NORMAL

## 2020-10-09 NOTE — TELEPHONE ENCOUNTER
----- Message from Michelle Major PA-C sent at 10/9/2020  1:58 PM CDT -----  Left sternum SCC please schedule for excision

## 2020-10-09 NOTE — LETTER
[facility]  [address]  [address]  [phone]    10/9/2020       Oliver Baires  [address]  [facility] [address]      Dear Oliver:    You are scheduled for Mohs Surgery on: 10/29/20 @10:30am.    Please check in at [facility], [address].     You don't need to arrive more than 5-10 minutes prior to your appointment time.     Be sure to eat a good breakfast and bathe and wash your hair prior to surgery.     If you are taking any anti-coagulants that are prescribed by your Doctor (such as Coumadin/Warfarin, Plavix, Aspirin, Ibuprofen), please continue taking them.     However, if you are taking anti-coagulants over the counter without a Doctor's order for a medical condition, please discontinue them 10 days prior to surgery.     Please wear loose comfortable clothing as it could possibly be 4-6 hours until your surgery is completed depending upon how many layers of tissue need to be removed.      Thank you,    MARILEE Linares MD

## 2020-10-09 NOTE — TELEPHONE ENCOUNTER
Called and spoke to patient.    Educated patient on biopsy results- SCC.     Educated patient on SCC, mohs, scheduled mohs, and letter/packet sent.    Patient voiced understanding.    Rosana RN-BSN-PHN  [facility]  [phone]

## 2020-10-14 DIAGNOSIS — I10 ESSENTIAL HYPERTENSION: ICD-10-CM

## 2020-10-15 RX ORDER — LISINOPRIL 5 MG/1
5 TABLET ORAL DAILY
Qty: 90 TABLET | Refills: 3 | Status: SHIPPED | OUTPATIENT
Start: 2020-10-15 | End: 2021-12-28

## 2020-10-29 ENCOUNTER — OFFICE VISIT (OUTPATIENT)
Dept: DERMATOLOGY | Facility: CLINIC | Age: 80
End: 2020-10-29
Payer: COMMERCIAL

## 2020-10-29 VITALS — SYSTOLIC BLOOD PRESSURE: 110 MMHG | DIASTOLIC BLOOD PRESSURE: 66 MMHG | HEART RATE: 74 BPM | OXYGEN SATURATION: 97 %

## 2020-10-29 DIAGNOSIS — C44.529 SQUAMOUS CELL CARCINOMA OF STERNUM: Primary | ICD-10-CM

## 2020-10-29 PROCEDURE — 17313 MOHS 1 STAGE T/A/L: CPT | Performed by: DERMATOLOGY

## 2020-10-29 PROCEDURE — 99207 PR NO CHARGE LOS: CPT | Performed by: DERMATOLOGY

## 2020-10-29 NOTE — PROGRESS NOTES
Surgical Office Location:  [facility]  [address] [place] [id number]

## 2020-10-29 NOTE — PATIENT INSTRUCTIONS
Open Wound Care     for ______________        ? No strenuous activity for 48 hours    ? Take Tylenol as needed for discomfort.                                                .         ? Do not drink alcoholic beverages for 48 hours.    ? Keep the pressure bandage in place for 24 hours. If the bandage becomes blood tinged or loose, reinforce it with gauze and tape.        (Refer to the reverse side of this page for management of bleeding).    ? Remove bandage in 24 hours and begin wound care as follows:     1. Clean area with tap water using a Q tip or gauze pad, (shower / bathe normally)  2. Dry wound with Q tip or gauze pad  3. Apply Aquaphor, Vaseline, Polysporin or Bacitracin Ointment with a Q tip    Do NOT use Neosporin Ointment *  4. Cover the wound with a band-aid or nonstick gauze pad and paper tape.  5. Repeat wound care once a day until wound is completely healed.    It is an old wives tale that a wound heals better when it is exposed to air and allowed to dry out. The wound will heal faster with a better cosmetic result if it is kept moist with ointment and covered with a bandage.  Do not let the wound dry out.      Supplies Needed:                Qtips or gauze pads                Polysporin or Bacitracin Ointment                Bandaids or nonstick gauze pads and paper tape    Wound care kits and brown paper tape are available for purchase at   the pharmacy.    BLEEDIN. Use tightly rolled up gauze or cloth to apply direct pressure over the bandage for 20   minutes.  2. Reapply pressure for an additional 20 minutes if necessary  3. Call the office or go to the nearest emergency room if pressure fails to stop the bleeding.  4. Use additional gauze and tape to maintain pressure once the bleeding has stopped.  5. Begin wound care 24 hours after surgery as directed.                  WOUND HEALING    1. One week after surgery a pink / red halo will form around the outside of the wound.   This is new  skin.  2. The center of the wound will appear yellowish white and produce some drainage.  3. The pink halo will slowly migrate in toward the center of the wound until the wound is covered with new shiny pink skin.  4. There will be no more drainage when the wound is completely healed.  5. It will take six months to one year for the redness to fade.  6. The scar may be itchy, tight and sensitive to extreme temperatures for a year after the surgery.  7. Massaging the area several times a day for several minutes after the wound is completely healed will help the scar soften and normalize faster. Begin massage only after healing is complete.      In case of emergency call: Dr Linares: [phone]     [facility]: [phone]

## 2020-10-29 NOTE — PROGRESS NOTES
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  Patient has no other skin complaints today.  Remainder of the HPI, Meds, PMH, Allergies, FH, and SH was reviewed in chart.      Past Medical History:   Diagnosis Date     Aortic root dilatation (H) 2019    chest CTA     Arthritis     Left knee-L knee injury     AV block      Bradycardia     not on bb due to low HR     CAD (coronary artery disease)     Cath 2010- angioplasty & Xience 3.0x15mm stent to prox LAD; STEMI 10/2004- 3.5 x 33mm Cypher ALEJANDRA to distal RCA, angioplasty to VINCENT, Lcx25%, Lma 25%     ED (erectile dysfunction)      GERD (gastroesophageal reflux disease)      Hyperlipidemia      Hypertension      IBS (irritable bowel syndrome)      Impaired fasting glucose      Liver cyst 2017    right lower lobe     MI (myocardial infarction) (H)     STEMI 10/2004     Mild ascending aorta dilatation (H) 2019    chest CTA     Personal history of colonic polyps     Tubular adenomas excised ,      Pneumonia 2004    pleural effusion     Pulmonary emboli (H) 2017    recurrent PE when off anticoag-now lifelong anticoag (unprovoked after car ride)- right leg dvt     Skin cancer      Squamous cell carcinoma        Past Surgical History:   Procedure Laterality Date     CHOLECYSTECTOMY       HC COLONOSCOPY THRU STOMA, DIAGNOSTIC      Single tubular adenoma excised     HC COLONOSCOPY THRU STOMA, DIAGNOSTIC      Tubular adenoma, hepatic flexure.     HC REMOVAL GALLBLADDER       HEART CATH, ANGIOPLASTY  2010    Angioplasty & Xience 3.0x15mm stent to prox LAD     HEART CATH, ANGIOPLASTY  10/30/2004    3.5x33mm Cypher stent to distal RCA; angioplasty to VINCENT     SURGICAL HISTORY OF -       Angioplasty/stenting of RCA        Family History   Problem Relation Age of Onset     Cancer Father         Pancreatic CA,  age 82     Cardiovascular Mother          of  ruptured AAA age 92     Cancer Brother         liver ca       Social History     Socioeconomic History     Marital status:      Spouse name: Not on file     Number of children: 2     Years of education: Not on file     Highest education level: Not on file   Occupational History     Employer: RETIRED   Social Needs     Financial resource strain: Not on file     Food insecurity     Worry: Not on file     Inability: Not on file     Transportation needs     Medical: Not on file     Non-medical: Not on file   Tobacco Use     Smoking status: Former Smoker     Packs/day: 0.00     Quit date: 1980     Years since quittin.2     Smokeless tobacco: Never Used   Substance and Sexual Activity     Alcohol use: Not Currently     Alcohol/week: 0.0 standard drinks     Comment: 2/day normally - no alcohol recently     Drug use: No     Sexual activity: Yes   Lifestyle     Physical activity     Days per week: Not on file     Minutes per session: Not on file     Stress: Not on file   Relationships     Social connections     Talks on phone: Not on file     Gets together: Not on file     Attends Jehovah's witness service: Not on file     Active member of club or organization: Not on file     Attends meetings of clubs or organizations: Not on file     Relationship status: Not on file     Intimate partner violence     Fear of current or ex partner: Not on file     Emotionally abused: Not on file     Physically abused: Not on file     Forced sexual activity: Not on file   Other Topics Concern     Parent/sibling w/ CABG, MI or angioplasty before 65F 55M? Not Asked      Service Not Asked     Blood Transfusions Not Asked     Caffeine Concern No     Comment: 3 cups in am     Occupational Exposure Not Asked     Hobby Hazards Not Asked     Sleep Concern Not Asked     Stress Concern Not Asked     Weight Concern No     Special Diet No     Back Care Not Asked     Exercise Yes     Comment: 1-2 x week     Bike Helmet Not Asked     Seat  Belt Yes     Self-Exams Not Asked   Social History Narrative     Not on file       Outpatient Encounter Medications as of 10/29/2020   Medication Sig Dispense Refill     atorvastatin (LIPITOR) 80 MG tablet Take 1 tablet (80 mg) by mouth daily 90 tablet 3     Cholecalciferol (VITAMIN D PO) Take 1,000 Units by mouth daily        lisinopril (ZESTRIL) 5 MG tablet Take 1 tablet (5 mg) by mouth daily 90 tablet 3     metoprolol succinate ER (TOPROL-XL) 25 MG 24 hr tablet Take 0.5 tablets (12.5 mg) by mouth daily 50 tablet 3     nitroGLYcerin (NITROSTAT) 0.4 MG sublingual tablet Place 1 tablet (0.4 mg) under the tongue every 5 minutes as needed for chest pain up to 3 tablets per episode. 60 tablet 1     rivaroxaban ANTICOAGULANT (XARELTO ANTICOAGULANT) 20 MG TABS tablet TAKE 1 TABLET (20 MG) BY MOUTH DAILY (WITH DINNER) 90 tablet 3     No facility-administered encounter medications on file as of 10/29/2020.              Review Of Systems  Skin: As above  Eyes: negative  Ears/Nose/Throat: negative  Respiratory: No shortness of breath, dyspnea on exertion, cough, or hemoptysis  Cardiovascular: negative  Gastrointestinal: negative  Genitourinary: negative  Musculoskeletal: negative  Neurologic: negative  Psychiatric: negative  Hematologic/Lymphatic/Immunologic: negative  Endocrine: negative      O:   NAD, WDWN, Alert & Oriented, Mood & Affect wnl, Vitals stable   Here today alone   /66   Pulse 74   SpO2 97%    General appearance normal   Vitals stable   Alert, oriented and in no acute distress      Following lymph nodes palpated: Occipital, Cervical, Supraclavicular no lad   Mid superior sternum ill-defined red scaly 1.1 cm plaque      Eyes: Conjunctivae/lids:Normal     ENT: Lips, buccal mucosa, tongue: normal    MSK:Normal    Cardiovascular: peripheral edema none    Pulm: Breathing Normal    Lymph Nodes: No Head and Neck Lymphadenopathy     Neuro/Psych: Orientation:Alert and Orientedx3 ; Mood/Affect:normal        A/P:  1. Mid sternum squamous cell carcinoma   MOHS:   Ill-defined margins    The rationale for Mohs surgery was discussed with the patient and consent was obtained.  The risks and benefits as well as alternatives to therapy were discussed, in detail.  Specifically, the risks of infection, scarring, bleeding, prolonged wound healing, incomplete removal, allergy to anesthesia, nerve injury and recurrence were addressed.  Indication for Mohs was Ill-defined margins. Prior to the procedure, the treatment site was clearly identified and, if available, confirmed with previous photos and confirmed by the patient   All components of the Universal Protocol/PAUSE rule were completed.  The Mohs surgeon operated in two distinct and integrated capacities as the surgeon and pathologist.      The area was prepped with Betasept.  A rim of normal appearing skin was marked circumferentially around the lesion.  The area was infiltrated with local anesthesia.  The tumor was first debulked to remove all clinically apparent tumor.  An incision following the standard Mohs approach was done and the specimen was oriented,mapped and placed in 1 block(s).  Each specimen was then chromacoded and processed in the Mohs laboratory using standard Mohs technique and submitted for frozen section histology.  Frozen section analysis showed no residual tumor but CLEAR MARGINS.      The tumor was excised using standard Mohs technique in 1 stages(s).  CLEAR MARGINS OBTAINED and Final defect size was 1.7 x 1.5 cm.     We discussed the options for wound management in full with the patient including risks/benefits/ possible outcomes.        REPAIR SECOND INTENT: We discussed the options for wound management in full with the patient including risks/benefits/possible outcomes. Decision made to allow the wound to heal by second intention. EBL minimal; complications none; wound care routine.  The patient was discharged in good condition and will return  in one month or prn for wound evaluation.  BENIGN LESIONS DISCUSSED WITH PATIENT:  I discussed the specifics of tumor, prognosis, and genetics of benign lesions.  I explained that treatment of these lesions would be purely cosmetic and not medically neccessary.  I discussed with patient different removal options including excision, cautery and /or laser.      Nature and genetics of benign skin lesions dicussed with patient.  Signs and Symptoms of skin cancer discussed with patient.  Patient encouraged to perform monthly skin exams.  UV precautions reviewed with patient.  Skin care regimen reviewed with patient: Eliminate harsh soaps, i.e. Dial, zest, irsih spring; Mild soaps such as Cetaphil or Dove sensitive skin, avoid hot or cold showers, aggressive use of emollients including vanicream, cetaphil or cerave discussed with patient.    Risks of non-melanoma skin cancer discussed with patient   Return to clinic 6 months

## 2020-10-29 NOTE — NURSING NOTE
"Initial /66   Pulse 74   SpO2 97%  Estimated body mass index is 26.32 kg/m  as calculated from the following:    Height as of 2/3/20: 1.88 m (6' 2\").    Weight as of 2/3/20: 93 kg (205 lb). .    SHARLENE Wayne-BSN-N  [facility]  [phone]  "

## 2020-10-29 NOTE — LETTER
10/29/2020         RE: Oliver Baires  [address]  [facility] [address]        Dear Colleague,    Thank you for referring your patient, Oliver Baires, to the [facility]. Please see a copy of my visit note below.    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  Patient has no other skin complaints today.  Remainder of the HPI, Meds, PMH, Allergies, FH, and SH was reviewed in chart.      Past Medical History:   Diagnosis Date     Aortic root dilatation (H) 12/31/2019    chest CTA     Arthritis     Left knee-L knee injury     AV block      Bradycardia     not on bb due to low HR     CAD (coronary artery disease)     Cath 6/2010- angioplasty & Xience 3.0x15mm stent to prox LAD; STEMI 10/2004- 3.5 x 33mm Cypher ALEJANDRA to distal RCA, angioplasty to VINCENT, Lcx25%, Lma 25%     ED (erectile dysfunction)      GERD (gastroesophageal reflux disease)      Hyperlipidemia      Hypertension      IBS (irritable bowel syndrome)      Impaired fasting glucose      Liver cyst 2017    right lower lobe     MI (myocardial infarction) (H)     STEMI 10/2004     Mild ascending aorta dilatation (H) 12/31/2019    chest CTA     Personal history of colonic polyps     Tubular adenomas excised 1999, 2002     Pneumonia 2004    pleural effusion     Pulmonary emboli (H) 2017    recurrent PE when off anticoag-now lifelong anticoag (unprovoked after car ride)- right leg dvt     Skin cancer      Squamous cell carcinoma        Past Surgical History:   Procedure Laterality Date     CHOLECYSTECTOMY       HC COLONOSCOPY THRU STOMA, DIAGNOSTIC  1999    Single tubular adenoma excised     HC COLONOSCOPY THRU STOMA, DIAGNOSTIC  2002    Tubular adenoma, hepatic flexure.     HC REMOVAL GALLBLADDER       HEART CATH, ANGIOPLASTY  6/23/2010     Angioplasty & Xience 3.0x15mm stent to prox LAD     HEART CATH, ANGIOPLASTY  10/30/2004    3.5x33mm Cypher stent to distal RCA; angioplasty to VINCENT     SURGICAL HISTORY OF -       Angioplasty/stenting of RCA        Family History   Problem Relation Age of Onset     Cancer Father         Pancreatic CA,  age 82     Cardiovascular Mother          of ruptured AAA age 92     Cancer Brother         liver ca       Social History     Socioeconomic History     Marital status:      Spouse name: Not on file     Number of children: 2     Years of education: Not on file     Highest education level: Not on file   Occupational History     Employer: RETIRED   Social Needs     Financial resource strain: Not on file     Food insecurity     Worry: Not on file     Inability: Not on file     Transportation needs     Medical: Not on file     Non-medical: Not on file   Tobacco Use     Smoking status: Former Smoker     Packs/day: 0.00     Quit date: 1980     Years since quittin.2     Smokeless tobacco: Never Used   Substance and Sexual Activity     Alcohol use: Not Currently     Alcohol/week: 0.0 standard drinks     Comment: 2/day normally - no alcohol recently     Drug use: No     Sexual activity: Yes   Lifestyle     Physical activity     Days per week: Not on file     Minutes per session: Not on file     Stress: Not on file   Relationships     Social connections     Talks on phone: Not on file     Gets together: Not on file     Attends Muslim service: Not on file     Active member of club or organization: Not on file     Attends meetings of clubs or organizations: Not on file     Relationship status: Not on file     Intimate partner violence     Fear of current or ex partner: Not on file     Emotionally abused: Not on file     Physically abused: Not on file     Forced sexual activity: Not on file   Other Topics Concern     Parent/sibling w/ CABG, MI or angioplasty before 65F 55M? Not Asked       Service Not Asked     Blood Transfusions Not Asked     Caffeine Concern No     Comment: 3 cups in am     Occupational Exposure Not Asked     Hobby Hazards Not Asked     Sleep Concern Not Asked     Stress Concern Not Asked     Weight Concern No     Special Diet No     Back Care Not Asked     Exercise Yes     Comment: 1-2 x week     Bike Helmet Not Asked     Seat Belt Yes     Self-Exams Not Asked   Social History Narrative     Not on file       Outpatient Encounter Medications as of 10/29/2020   Medication Sig Dispense Refill     atorvastatin (LIPITOR) 80 MG tablet Take 1 tablet (80 mg) by mouth daily 90 tablet 3     Cholecalciferol (VITAMIN D PO) Take 1,000 Units by mouth daily        lisinopril (ZESTRIL) 5 MG tablet Take 1 tablet (5 mg) by mouth daily 90 tablet 3     metoprolol succinate ER (TOPROL-XL) 25 MG 24 hr tablet Take 0.5 tablets (12.5 mg) by mouth daily 50 tablet 3     nitroGLYcerin (NITROSTAT) 0.4 MG sublingual tablet Place 1 tablet (0.4 mg) under the tongue every 5 minutes as needed for chest pain up to 3 tablets per episode. 60 tablet 1     rivaroxaban ANTICOAGULANT (XARELTO ANTICOAGULANT) 20 MG TABS tablet TAKE 1 TABLET (20 MG) BY MOUTH DAILY (WITH DINNER) 90 tablet 3     No facility-administered encounter medications on file as of 10/29/2020.              Review Of Systems  Skin: As above  Eyes: negative  Ears/Nose/Throat: negative  Respiratory: No shortness of breath, dyspnea on exertion, cough, or hemoptysis  Cardiovascular: negative  Gastrointestinal: negative  Genitourinary: negative  Musculoskeletal: negative  Neurologic: negative  Psychiatric: negative  Hematologic/Lymphatic/Immunologic: negative  Endocrine: negative      O:   NAD, WDWN, Alert & Oriented, Mood & Affect wnl, Vitals stable   Here today alone   /66   Pulse 74   SpO2 97%    General appearance normal   Vitals stable   Alert, oriented and in no acute distress      Following lymph nodes palpated: Occipital, Cervical,  Supraclavicular no lad   Mid superior sternum ill-defined red scaly 1.1 cm plaque      Eyes: Conjunctivae/lids:Normal     ENT: Lips, buccal mucosa, tongue: normal    MSK:Normal    Cardiovascular: peripheral edema none    Pulm: Breathing Normal    Lymph Nodes: No Head and Neck Lymphadenopathy     Neuro/Psych: Orientation:Alert and Orientedx3 ; Mood/Affect:normal       A/P:  1. Mid sternum squamous cell carcinoma   MOHS:   Ill-defined margins    The rationale for Mohs surgery was discussed with the patient and consent was obtained.  The risks and benefits as well as alternatives to therapy were discussed, in detail.  Specifically, the risks of infection, scarring, bleeding, prolonged wound healing, incomplete removal, allergy to anesthesia, nerve injury and recurrence were addressed.  Indication for Mohs was Ill-defined margins. Prior to the procedure, the treatment site was clearly identified and, if available, confirmed with previous photos and confirmed by the patient   All components of the Universal Protocol/PAUSE rule were completed.  The Mohs surgeon operated in two distinct and integrated capacities as the surgeon and pathologist.      The area was prepped with Betasept.  A rim of normal appearing skin was marked circumferentially around the lesion.  The area was infiltrated with local anesthesia.  The tumor was first debulked to remove all clinically apparent tumor.  An incision following the standard Mohs approach was done and the specimen was oriented,mapped and placed in 1 block(s).  Each specimen was then chromacoded and processed in the Mohs laboratory using standard Mohs technique and submitted for frozen section histology.  Frozen section analysis showed no residual tumor but CLEAR MARGINS.      The tumor was excised using standard Mohs technique in 1 stages(s).  CLEAR MARGINS OBTAINED and Final defect size was 1.7 x 1.5 cm.     We discussed the options for wound management in full with the patient  including risks/benefits/ possible outcomes.        REPAIR SECOND INTENT: We discussed the options for wound management in full with the patient including risks/benefits/possible outcomes. Decision made to allow the wound to heal by second intention. EBL minimal; complications none; wound care routine.  The patient was discharged in good condition and will return in one month or prn for wound evaluation.  BENIGN LESIONS DISCUSSED WITH PATIENT:  I discussed the specifics of tumor, prognosis, and genetics of benign lesions.  I explained that treatment of these lesions would be purely cosmetic and not medically neccessary.  I discussed with patient different removal options including excision, cautery and /or laser.      Nature and genetics of benign skin lesions dicussed with patient.  Signs and Symptoms of skin cancer discussed with patient.  Patient encouraged to perform monthly skin exams.  UV precautions reviewed with patient.  Skin care regimen reviewed with patient: Eliminate harsh soaps, i.e. Dial, zest, irsih spring; Mild soaps such as Cetaphil or Dove sensitive skin, avoid hot or cold showers, aggressive use of emollients including vanicream, cetaphil or cerave discussed with patient.    Risks of non-melanoma skin cancer discussed with patient   Return to clinic 6 months        Again, thank you for allowing me to participate in the care of your patient.        Sincerely,        Evelio Linares MD

## 2020-11-01 ENCOUNTER — OFFICE VISIT (OUTPATIENT)
Dept: URGENT CARE | Facility: URGENT CARE | Age: 80
End: 2020-11-01
Payer: COMMERCIAL

## 2020-11-01 VITALS
DIASTOLIC BLOOD PRESSURE: 70 MMHG | HEART RATE: 81 BPM | SYSTOLIC BLOOD PRESSURE: 108 MMHG | BODY MASS INDEX: 26.31 KG/M2 | HEIGHT: 74 IN | TEMPERATURE: 97.7 F | OXYGEN SATURATION: 98 % | WEIGHT: 205 LBS

## 2020-11-01 DIAGNOSIS — S61.209A FINGER WOUND, SIMPLE, OPEN, INITIAL ENCOUNTER: Primary | ICD-10-CM

## 2020-11-01 PROCEDURE — 90715 TDAP VACCINE 7 YRS/> IM: CPT | Performed by: FAMILY MEDICINE

## 2020-11-01 PROCEDURE — 99213 OFFICE O/P EST LOW 20 MIN: CPT | Mod: 25 | Performed by: FAMILY MEDICINE

## 2020-11-01 PROCEDURE — 90471 IMMUNIZATION ADMIN: CPT | Performed by: FAMILY MEDICINE

## 2020-11-01 RX ORDER — CEPHALEXIN 500 MG/1
500 CAPSULE ORAL 2 TIMES DAILY
Qty: 14 CAPSULE | Refills: 0 | Status: SHIPPED | OUTPATIENT
Start: 2020-11-01 | End: 2021-06-14

## 2020-11-01 ASSESSMENT — MIFFLIN-ST. JEOR: SCORE: 1709.62

## 2020-11-01 NOTE — PROGRESS NOTES
SUBJECTIVE:   Oliver Baires is a 80 year old male presenting with a chief complaint of   Chief Complaint   Patient presents with     Puncture Wound   He was turning over his compost pile and had a tonia nail actually go through the right thumb.  It went in perpendicular through and through on the palmar side of the thumb.     He is having no problems with function of the thumb.  He said he had minimal bleeding.  There is some swelling between the MCP and the PIP    He is an established patient of [facility].        Review of Systems   Skin:        Puncture wound right thumb.  Through and through   All other systems reviewed and are negative.      Past Medical History:   Diagnosis Date     Aortic root dilatation (H) 2019    chest CTA     Arthritis     Left knee-L knee injury     AV block      Bradycardia     not on bb due to low HR     CAD (coronary artery disease)     Cath 2010- angioplasty & Xience 3.0x15mm stent to prox LAD; STEMI 10/2004- 3.5 x 33mm Cypher ALEJANDRA to distal RCA, angioplasty to VINCENT, Lcx25%, Lma 25%     ED (erectile dysfunction)      GERD (gastroesophageal reflux disease)      Hyperlipidemia      Hypertension      IBS (irritable bowel syndrome)      Impaired fasting glucose      Liver cyst 2017    right lower lobe     MI (myocardial infarction) (H)     STEMI 10/2004     Mild ascending aorta dilatation (H) 2019    chest CTA     Personal history of colonic polyps     Tubular adenomas excised ,      Pneumonia 2004    pleural effusion     Pulmonary emboli (H) 2017    recurrent PE when off anticoag-now lifelong anticoag (unprovoked after car ride)- right leg dvt     Skin cancer      Squamous cell carcinoma      Family History   Problem Relation Age of Onset     Cancer Father         Pancreatic CA,  age 82     Cardiovascular Mother          of ruptured AAA age 92     Cancer Brother         liver ca     Current Outpatient Medications   Medication Sig Dispense Refill     atorvastatin  "(LIPITOR) 80 MG tablet Take 1 tablet (80 mg) by mouth daily 90 tablet 3     cephALEXin (KEFLEX) 500 MG capsule Take 1 capsule (500 mg) by mouth 2 times daily 14 capsule 0     Cholecalciferol (VITAMIN D PO) Take 1,000 Units by mouth daily        lisinopril (ZESTRIL) 5 MG tablet Take 1 tablet (5 mg) by mouth daily 90 tablet 3     metoprolol succinate ER (TOPROL-XL) 25 MG 24 hr tablet Take 0.5 tablets (12.5 mg) by mouth daily 50 tablet 3     nitroGLYcerin (NITROSTAT) 0.4 MG sublingual tablet Place 1 tablet (0.4 mg) under the tongue every 5 minutes as needed for chest pain up to 3 tablets per episode. 60 tablet 1     rivaroxaban ANTICOAGULANT (XARELTO ANTICOAGULANT) 20 MG TABS tablet TAKE 1 TABLET (20 MG) BY MOUTH DAILY (WITH DINNER) 90 tablet 3     Social History     Tobacco Use     Smoking status: Former Smoker     Packs/day: 0.00     Quit date: 1980     Years since quittin.2     Smokeless tobacco: Never Used   Substance Use Topics     Alcohol use: Not Currently     Alcohol/week: 0.0 standard drinks     Comment: 2/day normally - no alcohol recently       OBJECTIVE  /70   Pulse 81   Temp 97.7  F (36.5  C) (Oral)   Ht 1.88 m (6' 2\")   Wt 93 kg (205 lb)   SpO2 98%   BMI 26.32 kg/m      Physical Exam  Vitals signs and nursing note reviewed.   HENT:      Head: Normocephalic.      Nose: Nose normal.   Eyes:      Pupils: Pupils are equal, round, and reactive to light.   Neck:      Musculoskeletal: Normal range of motion.   Cardiovascular:      Rate and Rhythm: Normal rate.      Pulses: Normal pulses.      Heart sounds: No murmur.   Pulmonary:      Effort: Pulmonary effort is normal.      Breath sounds: No wheezing.   Musculoskeletal: Normal range of motion.   Skin:     General: Skin is warm.      Comments: Right thumb swelling between the MCP and the PIP.  Entrance wound perpendicular to the thumb on the palmar side.  Also exit wound on the palmar side   Neurological:      General: No focal deficit " present.      Mental Status: He is alert.      Comments: He is has good range of motion of the thumb sensation is intact.    No obvious bleeding   Psychiatric:         Mood and Affect: Mood normal.         Labs:  No results found for this or any previous visit (from the past 24 hour(s)).    X-Ray was not done.    ASSESSMENT:      ICD-10-CM    1. Finger wound, simple, open, initial encounter  S61.209A cephALEXin (KEFLEX) 500 MG capsule        Medical Decision Making:    Differential Diagnosis:  Puncture wound, tetanus update, antibiotics for infection.     Serious Comorbid Conditions:  Adult:  None    PLAN:  1.  Tdap  2.  Keflex  3.  Daily dressing over the next several days      Followup:    If not improving or if condition worsens, follow up with your Primary Care Provider    There are no Patient Instructions on file for this visit.

## 2020-11-16 DIAGNOSIS — E78.5 HYPERLIPIDEMIA LDL GOAL <100: ICD-10-CM

## 2020-11-17 RX ORDER — ATORVASTATIN CALCIUM 80 MG/1
80 TABLET, FILM COATED ORAL DAILY
Qty: 90 TABLET | Refills: 3 | Status: SHIPPED | OUTPATIENT
Start: 2020-11-17 | End: 2021-11-16

## 2021-01-12 DIAGNOSIS — I77.810 AORTIC ROOT DILATION (H): ICD-10-CM

## 2021-01-12 RX ORDER — METOPROLOL SUCCINATE 25 MG/1
12.5 TABLET, EXTENDED RELEASE ORAL DAILY
Qty: 45 TABLET | Refills: 0 | Status: SHIPPED | OUTPATIENT
Start: 2021-01-12 | End: 2021-02-18

## 2021-01-21 ENCOUNTER — OFFICE VISIT (OUTPATIENT)
Dept: INTERNAL MEDICINE | Facility: CLINIC | Age: 81
End: 2021-01-21
Payer: COMMERCIAL

## 2021-01-21 VITALS
WEIGHT: 203.7 LBS | SYSTOLIC BLOOD PRESSURE: 126 MMHG | TEMPERATURE: 97.3 F | OXYGEN SATURATION: 98 % | BODY MASS INDEX: 26.15 KG/M2 | DIASTOLIC BLOOD PRESSURE: 84 MMHG | HEART RATE: 62 BPM

## 2021-01-21 DIAGNOSIS — R10.9 FLANK PAIN: Primary | ICD-10-CM

## 2021-01-21 LAB
ALBUMIN UR-MCNC: NEGATIVE MG/DL
APPEARANCE UR: CLEAR
BILIRUB UR QL STRIP: NEGATIVE
COLOR UR AUTO: YELLOW
GLUCOSE UR STRIP-MCNC: NEGATIVE MG/DL
HGB UR QL STRIP: NEGATIVE
KETONES UR STRIP-MCNC: NEGATIVE MG/DL
LEUKOCYTE ESTERASE UR QL STRIP: NEGATIVE
NITRATE UR QL: NEGATIVE
PH UR STRIP: 6 PH (ref 5–7)
RBC #/AREA URNS AUTO: NORMAL /HPF
SOURCE: NORMAL
SP GR UR STRIP: 1.02 (ref 1–1.03)
UROBILINOGEN UR STRIP-ACNC: 0.2 EU/DL (ref 0.2–1)
WBC #/AREA URNS AUTO: NORMAL /HPF

## 2021-01-21 PROCEDURE — 36415 COLL VENOUS BLD VENIPUNCTURE: CPT | Performed by: INTERNAL MEDICINE

## 2021-01-21 PROCEDURE — 81001 URINALYSIS AUTO W/SCOPE: CPT | Performed by: INTERNAL MEDICINE

## 2021-01-21 PROCEDURE — 80048 BASIC METABOLIC PNL TOTAL CA: CPT | Performed by: INTERNAL MEDICINE

## 2021-01-21 PROCEDURE — 99213 OFFICE O/P EST LOW 20 MIN: CPT | Performed by: INTERNAL MEDICINE

## 2021-01-21 PROCEDURE — 80076 HEPATIC FUNCTION PANEL: CPT | Performed by: INTERNAL MEDICINE

## 2021-01-21 NOTE — PROGRESS NOTES
"  Assessment & Plan     Flank pain    - Basic metabolic panel  (Ca, Cl, CO2, Creat, Gluc, K, Na, BUN)  - UA with Microscopic  - Hepatic panel (Albumin, ALT, AST, Bili, Alk Phos, TP)             BMI:   Estimated body mass index is 26.15 kg/m  as calculated from the following:    Height as of 11/1/20: 1.88 m (6' 2\").    Weight as of this encounter: 92.4 kg (203 lb 11.2 oz).       See Patient Instructions    Return in about 6 months (around 7/21/2021) for Preventive Visit.    Renan Henry MD  [facility] TRINA Boyd is a 80 year old who presents to clinic today for the following health issues     HPI       Pt has had intermitted left lower back pain for about 3 weeks rates it about a 3 on a scale of 1-10           Review of Systems   Constitutional, HEENT, cardiovascular, pulmonary, GI, , musculoskeletal, neuro, skin, endocrine and psych systems are negative, except as otherwise noted.      Objective    /84   Pulse 62   Temp 97.3  F (36.3  C) (Oral)   Wt 92.4 kg (203 lb 11.2 oz)   SpO2 98%   BMI 26.15 kg/m    Body mass index is 26.15 kg/m .  Physical Exam   GENERAL: healthy, alert and no distress  NECK: no adenopathy, no asymmetry, masses, or scars and thyroid normal to palpation  RESP: lungs clear to auscultation - no rales, rhonchi or wheezes  CV: regular rate and rhythm, normal S1 S2, no S3 or S4, no murmur, click or rub, no peripheral edema and peripheral pulses strong  ABDOMEN: soft, nontender, no hepatosplenomegaly, no masses and bowel sounds normal  MS: no gross musculoskeletal defects noted, no edema                "

## 2021-01-21 NOTE — LETTER
1/25/2021         Oliver Baires  [address] [facility] [address]            Dear Mr. Baires,    I am writing to inform you of the lab tests you had performed recently.      Your lab results are as follows:    Liver function: NORMAL  Kidney function: NORMAL  Urine: NORMAL    All of your lab work was perfectly normal.  I do not think any additional work-up is necessary, as long as the pain eventually resolves.  If it persists, let me know.    Thank you for allowing me to participate in your care.  If you have further questions, please contact us at [phone].      Sincerely,        MICKY Henry MD  Dept. of Internal Medicine  [facility]

## 2021-01-22 LAB
ALBUMIN SERPL-MCNC: 3.7 G/DL (ref 3.4–5)
ALP SERPL-CCNC: 69 U/L (ref 40–150)
ALT SERPL W P-5'-P-CCNC: 46 U/L (ref 0–70)
ANION GAP SERPL CALCULATED.3IONS-SCNC: 6 MMOL/L (ref 3–14)
AST SERPL W P-5'-P-CCNC: 42 U/L (ref 0–45)
BILIRUB DIRECT SERPL-MCNC: 0.2 MG/DL (ref 0–0.2)
BILIRUB SERPL-MCNC: 0.6 MG/DL (ref 0.2–1.3)
BUN SERPL-MCNC: 17 MG/DL (ref 7–30)
CALCIUM SERPL-MCNC: 9.5 MG/DL (ref 8.5–10.1)
CHLORIDE SERPL-SCNC: 108 MMOL/L (ref 94–109)
CO2 SERPL-SCNC: 26 MMOL/L (ref 20–32)
CREAT SERPL-MCNC: 0.96 MG/DL (ref 0.66–1.25)
GFR SERPL CREATININE-BSD FRML MDRD: 74 ML/MIN/{1.73_M2}
GLUCOSE SERPL-MCNC: 99 MG/DL (ref 70–99)
POTASSIUM SERPL-SCNC: 4.2 MMOL/L (ref 3.4–5.3)
PROT SERPL-MCNC: 7.7 G/DL (ref 6.8–8.8)
SODIUM SERPL-SCNC: 140 MMOL/L (ref 133–144)

## 2021-02-05 DIAGNOSIS — I25.2 HX OF MYOCARDIAL INFARCTION: Primary | ICD-10-CM

## 2021-02-12 ENCOUNTER — HOSPITAL ENCOUNTER (OUTPATIENT)
Dept: CARDIOLOGY | Facility: CLINIC | Age: 81
Discharge: HOME | End: 2021-02-12
Attending: INTERNAL MEDICINE | Admitting: INTERNAL MEDICINE
Payer: COMMERCIAL

## 2021-02-12 DIAGNOSIS — I25.10 CAD (CORONARY ARTERY DISEASE): Primary | ICD-10-CM

## 2021-02-12 DIAGNOSIS — I25.2 HX OF MYOCARDIAL INFARCTION: ICD-10-CM

## 2021-02-12 PROCEDURE — 999N000208 ECHOCARDIOGRAM COMPLETE

## 2021-02-12 PROCEDURE — 255N000002 HC RX 255 OP 636: Performed by: INTERNAL MEDICINE

## 2021-02-12 PROCEDURE — 93306 TTE W/DOPPLER COMPLETE: CPT | Mod: 26 | Performed by: INTERNAL MEDICINE

## 2021-02-12 RX ADMIN — HUMAN ALBUMIN MICROSPHERES AND PERFLUTREN 9 ML: 10; .22 INJECTION, SOLUTION INTRAVENOUS at 15:31

## 2021-02-13 ENCOUNTER — IMMUNIZATION (OUTPATIENT)
Dept: NURSING | Facility: CLINIC | Age: 81
End: 2021-02-13
Payer: COMMERCIAL

## 2021-02-13 PROCEDURE — 0001A PR COVID VAC PFIZER DIL RECON 30 MCG/0.3 ML IM: CPT

## 2021-02-13 PROCEDURE — 91300 PR COVID VAC PFIZER DIL RECON 30 MCG/0.3 ML IM: CPT

## 2021-02-15 PROBLEM — I48.91 A-FIB (H): Status: ACTIVE | Noted: 2021-02-15

## 2021-02-18 ENCOUNTER — MYC REFILL (OUTPATIENT)
Dept: CARDIOLOGY | Facility: CLINIC | Age: 81
End: 2021-02-18

## 2021-02-18 DIAGNOSIS — I77.810 AORTIC ROOT DILATION (H): ICD-10-CM

## 2021-02-19 RX ORDER — METOPROLOL SUCCINATE 25 MG/1
12.5 TABLET, EXTENDED RELEASE ORAL DAILY
Qty: 45 TABLET | Refills: 0 | Status: SHIPPED | OUTPATIENT
Start: 2021-02-19 | End: 2021-08-11

## 2021-03-01 ENCOUNTER — OFFICE VISIT (OUTPATIENT)
Dept: CARDIOLOGY | Facility: CLINIC | Age: 81
End: 2021-03-01
Payer: COMMERCIAL

## 2021-03-01 VITALS
HEIGHT: 74 IN | WEIGHT: 209.2 LBS | BODY MASS INDEX: 26.85 KG/M2 | SYSTOLIC BLOOD PRESSURE: 130 MMHG | DIASTOLIC BLOOD PRESSURE: 81 MMHG | HEART RATE: 76 BPM

## 2021-03-01 DIAGNOSIS — I48.21 PERMANENT ATRIAL FIBRILLATION (H): ICD-10-CM

## 2021-03-01 DIAGNOSIS — I25.10 CORONARY ARTERY DISEASE INVOLVING NATIVE CORONARY ARTERY OF NATIVE HEART WITHOUT ANGINA PECTORIS: Primary | ICD-10-CM

## 2021-03-01 DIAGNOSIS — I71.20 THORACIC AORTIC ANEURYSM WITHOUT RUPTURE (H): ICD-10-CM

## 2021-03-01 PROCEDURE — 99214 OFFICE O/P EST MOD 30 MIN: CPT | Performed by: INTERNAL MEDICINE

## 2021-03-01 ASSESSMENT — MIFFLIN-ST. JEOR: SCORE: 1723.67

## 2021-03-01 NOTE — PROGRESS NOTES
HPI and Plan:   See dictation    Orders Placed This Encounter   Procedures     Follow-Up with Cardiologist     Holter Monitor 24 hour Adult Pediatric     Echocardiogram Complete     No orders of the defined types were placed in this encounter.    There are no discontinued medications.      Encounter Diagnoses   Name Primary?     Coronary artery disease involving native coronary artery of native heart without angina pectoris Yes     Permanent atrial fibrillation (H)      Thoracic aortic aneurysm without rupture (H)        CURRENT MEDICATIONS:  Current Outpatient Medications   Medication Sig Dispense Refill     atorvastatin (LIPITOR) 80 MG tablet Take 1 tablet (80 mg) by mouth daily 90 tablet 3     Cholecalciferol (VITAMIN D PO) Take 1,000 Units by mouth daily        lisinopril (ZESTRIL) 5 MG tablet Take 1 tablet (5 mg) by mouth daily 90 tablet 3     metoprolol succinate ER (TOPROL-XL) 25 MG 24 hr tablet Take 0.5 tablets (12.5 mg) by mouth daily Needs an appointment for refills. 45 tablet 0     nitroGLYcerin (NITROSTAT) 0.4 MG sublingual tablet Place 1 tablet (0.4 mg) under the tongue every 5 minutes as needed for chest pain up to 3 tablets per episode. 60 tablet 1     rivaroxaban ANTICOAGULANT (XARELTO ANTICOAGULANT) 20 MG TABS tablet TAKE 1 TABLET (20 MG) BY MOUTH DAILY (WITH DINNER) 90 tablet 3     cephALEXin (KEFLEX) 500 MG capsule Take 1 capsule (500 mg) by mouth 2 times daily (Patient not taking: Reported on 3/1/2021) 14 capsule 0       ALLERGIES     Allergies   Allergen Reactions     Metoprolol      Symptomatic bradycardia       PAST MEDICAL HISTORY:  Past Medical History:   Diagnosis Date     Aortic root dilatation (H) 12/31/2019    chest CTA     Arthritis     Left knee-L knee injury     AV block      Bradycardia     not on bb due to low HR     CAD (coronary artery disease)     Cath 6/2010- angioplasty & Xience 3.0x15mm stent to prox LAD; STEMI 10/2004- 3.5 x 33mm Cypher ALEJANDRA to distal RCA, angioplasty to VINCENT,  Lcx25%, Lma 25%     DVT (deep venous thrombosis) (H)     on long term noac due to dvtx2 and now afib     ED (erectile dysfunction)      GERD (gastroesophageal reflux disease)      Hyperlipidemia      Hypertension      IBS (irritable bowel syndrome)      Impaired fasting glucose      Liver cyst 2017    right lower lobe     MI (myocardial infarction) (H)     STEMI 10/2004     Mild ascending aorta dilatation (H) 2019    chest CTA     Permanent atrial fibrillation (H)      Personal history of colonic polyps     Tubular adenomas excised ,      Pneumonia 2004    pleural effusion     Pulmonary emboli (H) 2017    recurrent PE when off anticoag-now lifelong anticoag (unprovoked after car ride)- right leg dvt     Skin cancer      Squamous cell carcinoma        PAST SURGICAL HISTORY:  Past Surgical History:   Procedure Laterality Date     CHOLECYSTECTOMY       HC COLONOSCOPY THRU STOMA, DIAGNOSTIC      Single tubular adenoma excised     HC COLONOSCOPY THRU STOMA, DIAGNOSTIC      Tubular adenoma, hepatic flexure.     HC REMOVAL GALLBLADDER       HEART CATH, ANGIOPLASTY  2010    Angioplasty & Xience 3.0x15mm stent to prox LAD     HEART CATH, ANGIOPLASTY  10/30/2004    3.5x33mm Cypher stent to distal RCA; angioplasty to VINCENT     SURGICAL HISTORY OF -       Angioplasty/stenting of RCA       FAMILY HISTORY:  Family History   Problem Relation Age of Onset     Cancer Father         Pancreatic CA,  age 82     Cardiovascular Mother          of ruptured AAA age 92     Cancer Brother         liver ca       SOCIAL HISTORY:  Social History     Socioeconomic History     Marital status:      Spouse name: None     Number of children: 2     Years of education: None     Highest education level: None   Occupational History     Employer: RETIRED   Social Needs     Financial resource strain: None     Food insecurity     Worry: None     Inability: None     Transportation needs     Medical: None      Non-medical: None   Tobacco Use     Smoking status: Former Smoker     Packs/day: 0.00     Quit date: 1980     Years since quittin.5     Smokeless tobacco: Never Used   Substance and Sexual Activity     Alcohol use: Yes     Alcohol/week: 0.0 standard drinks     Comment: 2 drinks day     Drug use: No     Sexual activity: Yes   Lifestyle     Physical activity     Days per week: None     Minutes per session: None     Stress: None   Relationships     Social connections     Talks on phone: None     Gets together: None     Attends Orthodox service: None     Active member of club or organization: None     Attends meetings of clubs or organizations: None     Relationship status: None     Intimate partner violence     Fear of current or ex partner: None     Emotionally abused: None     Physically abused: None     Forced sexual activity: None   Other Topics Concern     Parent/sibling w/ CABG, MI or angioplasty before 65F 55M? Not Asked      Service Not Asked     Blood Transfusions Not Asked     Caffeine Concern No     Comment: 3 cups in am     Occupational Exposure Not Asked     Hobby Hazards Not Asked     Sleep Concern Not Asked     Stress Concern Not Asked     Weight Concern No     Special Diet No     Back Care Not Asked     Exercise Yes     Comment: 1-2 x week     Bike Helmet Not Asked     Seat Belt Yes     Self-Exams Not Asked   Social History Narrative     None       Review of Systems:  Skin:  Negative     Eyes:  Positive for visual blurring  ENT:  Positive for hearing loss  Respiratory:  Negative    Cardiovascular:  Negative;chest pain;syncope or near-syncope;cyanosis;lightheadedness;dizziness;exercise intolerance palpitations;Positive for;edema;fatigue  Gastroenterology: Negative    Genitourinary:  Negative    Musculoskeletal:  Positive for joint pain  Neurologic:  Negative    Psychiatric:  Negative    Heme/Lymph/Imm:  Negative    Endocrine:  Negative      Physical Exam:  Vitals: /81 (BP  "Location: Right arm, Cuff Size: Adult Large)   Pulse 76   Ht 1.88 m (6' 2\")   Wt 94.9 kg (209 lb 3.2 oz)   BMI 26.86 kg/m      Constitutional:  cooperative, alert and oriented, well developed, well nourished, in no acute distress   tall but no other features of marfans    Skin:  warm and dry to the touch, no apparent skin lesions or masses noted          Head:  normocephalic, no masses or lesions        Eyes:  pupils equal and round, conjunctivae and lids unremarkable, sclera white, no xanthalasma, EOMS intact, no nystagmus        Lymph:No Cervical lymphadenopathy present;No thyromegaly     ENT:           Neck:  carotid pulses are full and equal bilaterally, JVP normal, no carotid bruit        Respiratory:  normal breath sounds, clear to auscultation, normal A-P diameter, normal symmetry, normal respiratory excursion, no use of accessory muscles         Cardiac:   irregularly irregular rhythm         early diastolic murmur;grade 1 HR after reed walk 104 afib  pulses full and equal, no bruits auscultated                                        GI:  abdomen soft, non-tender, BS normoactive, no mass, no HSM, no bruits        Extremities and Muscular Skeletal:  no deformities, clubbing, cyanosis, erythema observed;no edema              Neurological:  no gross motor deficits        Psych:  Alert and Oriented x 3      Recent Lab Results:  LIPID RESULTS:  Lab Results   Component Value Date    CHOL 160 11/01/2019    HDL 51 11/01/2019    LDL 87 11/01/2019    TRIG 108 11/01/2019    CHOLHDLRATIO 3.1 10/29/2015       LIVER ENZYME RESULTS:  Lab Results   Component Value Date    AST 42 01/21/2021    ALT 46 01/21/2021       CBC RESULTS:  Lab Results   Component Value Date    WBC 9.3 02/03/2020    RBC 4.23 (L) 02/03/2020    HGB 13.7 02/03/2020    HCT 40.6 02/03/2020    MCV 96 02/03/2020    MCH 32.4 02/03/2020    MCHC 33.7 02/03/2020    RDW 13.1 02/03/2020     02/03/2020       BMP RESULTS:  Lab Results   Component Value " Date     01/21/2021    POTASSIUM 4.2 01/21/2021    CHLORIDE 108 01/21/2021    CO2 26 01/21/2021    ANIONGAP 6 01/21/2021    GLC 99 01/21/2021    BUN 17 01/21/2021    CR 0.96 01/21/2021    GFRESTIMATED 74 01/21/2021    GFRESTBLACK 86 01/21/2021    DALIA 9.5 01/21/2021        A1C RESULTS:  No results found for: A1C    INR RESULTS:  Lab Results   Component Value Date    INR 0.96 10/24/2017    INR 0.98 06/23/2010           CC  No referring provider defined for this encounter.

## 2021-03-01 NOTE — LETTER
3/1/2021    Renan Henry MD  [address] [address]    RE: Oliver Baires       Dear Colleague,    I had the pleasure of seeing Oliver Baires in the [facility].    HPI and Plan:   See dictation    Orders Placed This Encounter   Procedures     Follow-Up with Cardiologist     Holter Monitor 24 hour Adult Pediatric     Echocardiogram Complete     No orders of the defined types were placed in this encounter.    There are no discontinued medications.      Encounter Diagnoses   Name Primary?     Coronary artery disease involving native coronary artery of native heart without angina pectoris Yes     Permanent atrial fibrillation (H)      Thoracic aortic aneurysm without rupture (H)        CURRENT MEDICATIONS:  Current Outpatient Medications   Medication Sig Dispense Refill     atorvastatin (LIPITOR) 80 MG tablet Take 1 tablet (80 mg) by mouth daily 90 tablet 3     Cholecalciferol (VITAMIN D PO) Take 1,000 Units by mouth daily        lisinopril (ZESTRIL) 5 MG tablet Take 1 tablet (5 mg) by mouth daily 90 tablet 3     metoprolol succinate ER (TOPROL-XL) 25 MG 24 hr tablet Take 0.5 tablets (12.5 mg) by mouth daily Needs an appointment for refills. 45 tablet 0     nitroGLYcerin (NITROSTAT) 0.4 MG sublingual tablet Place 1 tablet (0.4 mg) under the tongue every 5 minutes as needed for chest pain up to 3 tablets per episode. 60 tablet 1     rivaroxaban ANTICOAGULANT (XARELTO ANTICOAGULANT) 20 MG TABS tablet TAKE 1 TABLET (20 MG) BY MOUTH DAILY (WITH DINNER) 90 tablet 3     cephALEXin (KEFLEX) 500 MG capsule Take 1 capsule (500 mg) by mouth 2 times daily (Patient not taking: Reported on 3/1/2021) 14 capsule 0       ALLERGIES     Allergies   Allergen Reactions     Metoprolol      Symptomatic bradycardia       PAST MEDICAL HISTORY:  Past Medical History:   Diagnosis Date     Aortic root dilatation (H) 12/31/2019    chest CTA     Arthritis     Left knee-L  knee injury     AV block      Bradycardia     not on bb due to low HR     CAD (coronary artery disease)     Cath 2010- angioplasty & Xience 3.0x15mm stent to prox LAD; STEMI 10/2004- 3.5 x 33mm Cypher ALEJANDRA to distal RCA, angioplasty to VINCENT, Lcx25%, Lma 25%     DVT (deep venous thrombosis) (H)     on long term noac due to dvtx2 and now afib     ED (erectile dysfunction)      GERD (gastroesophageal reflux disease)      Hyperlipidemia      Hypertension      IBS (irritable bowel syndrome)      Impaired fasting glucose      Liver cyst 2017    right lower lobe     MI (myocardial infarction) (H)     STEMI 10/2004     Mild ascending aorta dilatation (H) 2019    chest CTA     Permanent atrial fibrillation (H)      Personal history of colonic polyps     Tubular adenomas excised ,      Pneumonia     pleural effusion     Pulmonary emboli (H) 2017    recurrent PE when off anticoag-now lifelong anticoag (unprovoked after car ride)- right leg dvt     Skin cancer      Squamous cell carcinoma        PAST SURGICAL HISTORY:  Past Surgical History:   Procedure Laterality Date     CHOLECYSTECTOMY       HC COLONOSCOPY THRU STOMA, DIAGNOSTIC      Single tubular adenoma excised     HC COLONOSCOPY THRU STOMA, DIAGNOSTIC      Tubular adenoma, hepatic flexure.     HC REMOVAL GALLBLADDER       HEART CATH, ANGIOPLASTY  2010    Angioplasty & Xience 3.0x15mm stent to prox LAD     HEART CATH, ANGIOPLASTY  10/30/2004    3.5x33mm Cypher stent to distal RCA; angioplasty to VINCENT     SURGICAL HISTORY OF -       Angioplasty/stenting of RCA       FAMILY HISTORY:  Family History   Problem Relation Age of Onset     Cancer Father         Pancreatic CA,  age 82     Cardiovascular Mother          of ruptured AAA age 92     Cancer Brother         liver ca       SOCIAL HISTORY:  Social History     Socioeconomic History     Marital status:      Spouse name: None     Number of children: 2     Years of education:  None     Highest education level: None   Occupational History     Employer: RETIRED   Social Needs     Financial resource strain: None     Food insecurity     Worry: None     Inability: None     Transportation needs     Medical: None     Non-medical: None   Tobacco Use     Smoking status: Former Smoker     Packs/day: 0.00     Quit date: 1980     Years since quittin.5     Smokeless tobacco: Never Used   Substance and Sexual Activity     Alcohol use: Yes     Alcohol/week: 0.0 standard drinks     Comment: 2 drinks day     Drug use: No     Sexual activity: Yes   Lifestyle     Physical activity     Days per week: None     Minutes per session: None     Stress: None   Relationships     Social connections     Talks on phone: None     Gets together: None     Attends Spiritism service: None     Active member of club or organization: None     Attends meetings of clubs or organizations: None     Relationship status: None     Intimate partner violence     Fear of current or ex partner: None     Emotionally abused: None     Physically abused: None     Forced sexual activity: None   Other Topics Concern     Parent/sibling w/ CABG, MI or angioplasty before 65F 55M? Not Asked      Service Not Asked     Blood Transfusions Not Asked     Caffeine Concern No     Comment: 3 cups in am     Occupational Exposure Not Asked     Hobby Hazards Not Asked     Sleep Concern Not Asked     Stress Concern Not Asked     Weight Concern No     Special Diet No     Back Care Not Asked     Exercise Yes     Comment: 1-2 x week     Bike Helmet Not Asked     Seat Belt Yes     Self-Exams Not Asked   Social History Narrative     None       Review of Systems:  Skin:  Negative     Eyes:  Positive for visual blurring  ENT:  Positive for hearing loss  Respiratory:  Negative    Cardiovascular:  Negative;chest pain;syncope or near-syncope;cyanosis;lightheadedness;dizziness;exercise intolerance palpitations;Positive  "for;edema;fatigue  Gastroenterology: Negative    Genitourinary:  Negative    Musculoskeletal:  Positive for joint pain  Neurologic:  Negative    Psychiatric:  Negative    Heme/Lymph/Imm:  Negative    Endocrine:  Negative      Physical Exam:  Vitals: /81 (BP Location: Right arm, Cuff Size: Adult Large)   Pulse 76   Ht 1.88 m (6' 2\")   Wt 94.9 kg (209 lb 3.2 oz)   BMI 26.86 kg/m      Constitutional:  cooperative, alert and oriented, well developed, well nourished, in no acute distress   tall but no other features of marfans    Skin:  warm and dry to the touch, no apparent skin lesions or masses noted          Head:  normocephalic, no masses or lesions        Eyes:  pupils equal and round, conjunctivae and lids unremarkable, sclera white, no xanthalasma, EOMS intact, no nystagmus        Lymph:No Cervical lymphadenopathy present;No thyromegaly     ENT:           Neck:  carotid pulses are full and equal bilaterally, JVP normal, no carotid bruit        Respiratory:  normal breath sounds, clear to auscultation, normal A-P diameter, normal symmetry, normal respiratory excursion, no use of accessory muscles         Cardiac:   irregularly irregular rhythm         early diastolic murmur;grade 1 HR after reed walk 104 afib  pulses full and equal, no bruits auscultated                                        GI:  abdomen soft, non-tender, BS normoactive, no mass, no HSM, no bruits        Extremities and Muscular Skeletal:  no deformities, clubbing, cyanosis, erythema observed;no edema              Neurological:  no gross motor deficits        Psych:  Alert and Oriented x 3      Recent Lab Results:  LIPID RESULTS:  Lab Results   Component Value Date    CHOL 160 11/01/2019    HDL 51 11/01/2019    LDL 87 11/01/2019    TRIG 108 11/01/2019    CHOLHDLRATIO 3.1 10/29/2015       LIVER ENZYME RESULTS:  Lab Results   Component Value Date    AST 42 01/21/2021    ALT 46 01/21/2021       CBC RESULTS:  Lab Results   Component Value " Date    WBC 9.3 02/03/2020    RBC 4.23 (L) 02/03/2020    HGB 13.7 02/03/2020    HCT 40.6 02/03/2020    MCV 96 02/03/2020    MCH 32.4 02/03/2020    MCHC 33.7 02/03/2020    RDW 13.1 02/03/2020     02/03/2020       BMP RESULTS:  Lab Results   Component Value Date     01/21/2021    POTASSIUM 4.2 01/21/2021    CHLORIDE 108 01/21/2021    CO2 26 01/21/2021    ANIONGAP 6 01/21/2021    GLC 99 01/21/2021    BUN 17 01/21/2021    CR 0.96 01/21/2021    GFRESTIMATED 74 01/21/2021    GFRESTBLACK 86 01/21/2021    DALIA 9.5 01/21/2021        A1C RESULTS:  No results found for: A1C    INR RESULTS:  Lab Results   Component Value Date    INR 0.96 10/24/2017    INR 0.98 06/23/2010     Thank you for allowing me to participate in the care of your patient.      Sincerely,     Tommy Villarreal MD     [facility]    cc:   No referring provider defined for this encounter.

## 2021-03-06 ENCOUNTER — IMMUNIZATION (OUTPATIENT)
Dept: NURSING | Facility: CLINIC | Age: 81
End: 2021-03-06
Attending: INTERNAL MEDICINE
Payer: COMMERCIAL

## 2021-03-06 PROCEDURE — 91300 PR COVID VAC PFIZER DIL RECON 30 MCG/0.3 ML IM: CPT

## 2021-03-06 PROCEDURE — 0002A PR COVID VAC PFIZER DIL RECON 30 MCG/0.3 ML IM: CPT

## 2021-03-10 ENCOUNTER — APPOINTMENT (OUTPATIENT)
Dept: CT IMAGING | Facility: CLINIC | Age: 81
End: 2021-03-10
Attending: EMERGENCY MEDICINE
Payer: COMMERCIAL

## 2021-03-10 ENCOUNTER — HOSPITAL ENCOUNTER (EMERGENCY)
Facility: CLINIC | Age: 81
Discharge: HOME | End: 2021-03-10
Attending: EMERGENCY MEDICINE | Admitting: EMERGENCY MEDICINE
Payer: COMMERCIAL

## 2021-03-10 VITALS
RESPIRATION RATE: 24 BRPM | DIASTOLIC BLOOD PRESSURE: 81 MMHG | TEMPERATURE: 97.6 F | OXYGEN SATURATION: 98 % | SYSTOLIC BLOOD PRESSURE: 153 MMHG | HEART RATE: 72 BPM

## 2021-03-10 DIAGNOSIS — R00.1 SYMPTOMATIC BRADYCARDIA: ICD-10-CM

## 2021-03-10 DIAGNOSIS — I48.91 ATRIAL FIBRILLATION WITH SLOW VENTRICULAR RESPONSE (H): ICD-10-CM

## 2021-03-10 LAB
ANION GAP SERPL CALCULATED.3IONS-SCNC: 2 MMOL/L (ref 3–14)
BASOPHILS # BLD AUTO: 0 10E9/L (ref 0–0.2)
BASOPHILS NFR BLD AUTO: 0.5 %
BUN SERPL-MCNC: 18 MG/DL (ref 7–30)
CALCIUM SERPL-MCNC: 9.1 MG/DL (ref 8.5–10.1)
CHLORIDE SERPL-SCNC: 108 MMOL/L (ref 94–109)
CO2 SERPL-SCNC: 29 MMOL/L (ref 20–32)
CREAT SERPL-MCNC: 0.9 MG/DL (ref 0.66–1.25)
DIFFERENTIAL METHOD BLD: ABNORMAL
EOSINOPHIL # BLD AUTO: 0.4 10E9/L (ref 0–0.7)
EOSINOPHIL NFR BLD AUTO: 6.6 %
ERYTHROCYTE [DISTWIDTH] IN BLOOD BY AUTOMATED COUNT: 13.2 % (ref 10–15)
GFR SERPL CREATININE-BSD FRML MDRD: 80 ML/MIN/{1.73_M2}
GLUCOSE SERPL-MCNC: 105 MG/DL (ref 70–99)
HCT VFR BLD AUTO: 39.6 % (ref 40–53)
HGB BLD-MCNC: 13 G/DL (ref 13.3–17.7)
IMM GRANULOCYTES # BLD: 0 10E9/L (ref 0–0.4)
IMM GRANULOCYTES NFR BLD: 0.2 %
INTERPRETATION ECG - MUSE: NORMAL
LACTATE BLD-SCNC: 0.8 MMOL/L (ref 0.7–2)
LYMPHOCYTES # BLD AUTO: 1.7 10E9/L (ref 0.8–5.3)
LYMPHOCYTES NFR BLD AUTO: 29.8 %
MAGNESIUM SERPL-MCNC: 2.1 MG/DL (ref 1.6–2.3)
MCH RBC QN AUTO: 32.5 PG (ref 26.5–33)
MCHC RBC AUTO-ENTMCNC: 32.8 G/DL (ref 31.5–36.5)
MCV RBC AUTO: 99 FL (ref 78–100)
MONOCYTES # BLD AUTO: 0.8 10E9/L (ref 0–1.3)
MONOCYTES NFR BLD AUTO: 14.6 %
NEUTROPHILS # BLD AUTO: 2.7 10E9/L (ref 1.6–8.3)
NEUTROPHILS NFR BLD AUTO: 48.3 %
NRBC # BLD AUTO: 0 10*3/UL
NRBC BLD AUTO-RTO: 0 /100
PLATELET # BLD AUTO: 185 10E9/L (ref 150–450)
POTASSIUM SERPL-SCNC: 4.3 MMOL/L (ref 3.4–5.3)
RBC # BLD AUTO: 4 10E12/L (ref 4.4–5.9)
SODIUM SERPL-SCNC: 139 MMOL/L (ref 133–144)
TROPONIN I SERPL-MCNC: <0.015 UG/L (ref 0–0.04)
TSH SERPL DL<=0.005 MIU/L-ACNC: 3.96 MU/L (ref 0.4–4)
WBC # BLD AUTO: 5.6 10E9/L (ref 4–11)

## 2021-03-10 PROCEDURE — 85025 COMPLETE CBC W/AUTO DIFF WBC: CPT | Performed by: EMERGENCY MEDICINE

## 2021-03-10 PROCEDURE — 84443 ASSAY THYROID STIM HORMONE: CPT | Performed by: EMERGENCY MEDICINE

## 2021-03-10 PROCEDURE — 83735 ASSAY OF MAGNESIUM: CPT | Performed by: EMERGENCY MEDICINE

## 2021-03-10 PROCEDURE — 80048 BASIC METABOLIC PNL TOTAL CA: CPT | Performed by: EMERGENCY MEDICINE

## 2021-03-10 PROCEDURE — 70450 CT HEAD/BRAIN W/O DYE: CPT

## 2021-03-10 PROCEDURE — 93005 ELECTROCARDIOGRAM TRACING: CPT

## 2021-03-10 PROCEDURE — 99285 EMERGENCY DEPT VISIT HI MDM: CPT | Mod: 25

## 2021-03-10 PROCEDURE — 84484 ASSAY OF TROPONIN QUANT: CPT | Performed by: EMERGENCY MEDICINE

## 2021-03-10 PROCEDURE — 96360 HYDRATION IV INFUSION INIT: CPT

## 2021-03-10 PROCEDURE — 258N000003 HC RX IP 258 OP 636: Performed by: EMERGENCY MEDICINE

## 2021-03-10 PROCEDURE — 83605 ASSAY OF LACTIC ACID: CPT | Performed by: EMERGENCY MEDICINE

## 2021-03-10 RX ADMIN — SODIUM CHLORIDE 1000 ML: 9 INJECTION, SOLUTION INTRAVENOUS at 03:38

## 2021-03-10 ASSESSMENT — ENCOUNTER SYMPTOMS
SHORTNESS OF BREATH: 0
LIGHT-HEADEDNESS: 1
WEAKNESS: 0
FACIAL ASYMMETRY: 0
NAUSEA: 0
NUMBNESS: 0
VOMITING: 0
DIZZINESS: 0

## 2021-03-10 NOTE — ED NOTES
"Pt ambulated in hallway with steady gait but reports \"some lightheadedness, but not like im going to fall\"  "

## 2021-03-10 NOTE — DISCHARGE INSTRUCTIONS
Stop metoprolol.  Talk with your primary care provider and your cardiologist about stopping the metoprolol and what the next step will be.  They can also recheck your heart rate to ensure it is normalizing.  Return immediately if you are having worsening lightheadedness, vision changes, speech changes, numbness, or any other new or concerning symptoms.  Continue Xarelto.

## 2021-03-10 NOTE — ED NOTES
Bed: ED25  Expected date: 3/10/21  Expected time: 3:00 AM  Means of arrival: Ambulance  Comments:  Cresencio 541 81M afib, dizzy

## 2021-03-10 NOTE — ED TRIAGE NOTES
"pt reports feeling \"tipsy and dizzy\" when waking up to use the bathroom this morning. hx of MI, Afib  "

## 2021-03-10 NOTE — ED PROVIDER NOTES
History   Chief Complaint:  Lightheadedness       The history is provided by the patient and the EMS personnel.      Oliver Baires is an 81 year old male with history of PE/DVT and atrial fibrillation on Xarelto, HTN, and CAD who presents via EMS for evaluation of lightheadedness. Tonight approximately an hour prior to arrival, Oliver got up from sleep to urinate and while walking to the bathroom he started to feel abnormally lightheaded and unsteady on his feet. He was worried he was going to fall. He denies any room spinning sensation. He was worried he may be having a stroke and was checking for weakness or facial droop but found none. His lightheadedness seems to worsen when standing and improve somewhat when sitting still. Oliver did have a few beers last night with his last drink approximately eight hours prior to arrival. He denies chest pain, shortness of breath, nausea, vomiting, or numbness or tingling.     Per EMS the patient had a heart rate in the 50s and a blood sugar of 110.     Review of Systems   Eyes: Negative for visual disturbance.   Respiratory: Negative for shortness of breath.    Cardiovascular: Negative for chest pain.   Gastrointestinal: Negative for nausea and vomiting.   Musculoskeletal: Positive for gait problem (felt unsteady).   Neurological: Positive for light-headedness. Negative for dizziness, facial asymmetry, weakness and numbness.   All other systems reviewed and are negative.    Allergies:  Metoprolol      Medications:  Lipitor  Lisinopril  Toprol XL   Nitroglycerin   Xarelto     Past Medical History:    Aortic root dilatation  Arthritis   AV block   CAD   Bradycardia  DVT   ED   GERD   Hypertension  Hyperlipidemia   IBS    Liver cyst  Myocardial infarction   Atrial fibrillation   Pulmonary embolism   Right ventricular dilation   Squamous cell carcinoma      Past Surgical History:    Cholecystectomy  Colonoscopy   Heart cath angioplasty      Family History:    Pancreatic cancer -  Father   Ruptured AAA - Mother   Liver cancer - Brother     Social History:  The patient presents to the ED via EMS.   Alcohol use: Positive, last drink eight hours ago   .     Physical Exam     Patient Vitals for the past 24 hrs:   BP Temp Temp src Pulse Resp SpO2   03/10/21 0442 -- -- -- 72 24 --   03/10/21 0440 -- -- -- 58 11 --   03/10/21 0435 -- -- -- 52 10 98 %   03/10/21 0431 -- -- -- 60 15 97 %   03/10/21 0430 (!) 153/81 -- -- 63 23 98 %   03/10/21 0425 134/83 -- -- 53 13 97 %   03/10/21 0424 -- -- -- 61 18 98 %   03/10/21 0415 -- -- -- (!) 47 -- --   03/10/21 0410 -- -- -- (!) 47 -- --   03/10/21 0350 130/78 -- -- 52 14 --   03/10/21 0340 -- -- -- (!) 42 9 --   03/10/21 0330 130/81 -- -- 50 16 --   03/10/21 0315 (!) 135/90 -- -- 51 14 --   03/10/21 0309 (!) 117/91 97.6  F (36.4  C) Oral 57 16 98 %     Orthostatics:   Lying Orthostatic BP: 130/79   Lying Orthostatic Pulse: 50 bpm     Sitting Orthostatic BP: 171/101   Sitting Orthostatic Pulse: 60 bpm     Standing Orthostatic BP: 134/83   Standing Orthostatic Pulse: 57 bpm    Physical Exam  General: Well-developed and well-nourished. Well appearing elderly  man. Cooperative.  Head:  Atraumatic.  Eyes:  Conjunctivae, lids, and sclerae are normal.  ENT:    Normal nose. Moist mucous membranes.  Neck:  Supple. Normal range of motion.  CV:  Bradycardic rate and irregularly irregular rhythm. Normal heart sounds with no murmurs, rubs, or gallops detected.  Resp:  No respiratory distress. Clear to auscultation bilaterally without decreased breath sounds, wheezing, rales, or rhonchi.  GI:  Soft. Non-distended. Non-tender.    MS:  Normal ROM. No bilateral lower extremity edema.  Skin:  Warm. Non-diaphoretic. No pallor.  Neuro: Awake. A&Ox3.     Strength 5/5 bilateral upper and lower extremities.    Sensation intact to light touch.    No dysarthria.    No aphasia.  Psych:  Normal mood and affect. Normal speech.  Vitals reviewed.    Emergency Department  "Course   EKG  Indication: Lightheadedness   Time: 3:06:53  Rate 52 bpm. QRS duration 90 ms. QT/QTc 426/396 ms.   Atrial fibrillation with slow ventricular response, Cannot rule out anterior infarct age undetermined, Abnormal ECG   No acute ST changes.  Heart rate has decreased as compared to prior, dated 2/3/2020.    Imaging:  CT Head w/o Contrast:  IMPRESSION:   1.  No CT evidence for acute intracranial process.   2.  Brain atrophy and presumed chronic microvascular ischemic changes as above.  Per radiology.      Laboratory:   CBC: WBC 5.6, HGB 13.0 low,   BMP: Anion gap 2 low, Glucose 105 high, o/w WNL (Creatinine 0.90)   Magnesium: 2.1   Lactic acid whole blood: 0.8   Troponin (Collected 0316): <0.015    TSH with free T4 reflex: 3.96      Emergency Department Course:  Reviewed:  I reviewed nursing notes, vitals, and past medical history.    Assessments:  0301: I obtained history and examined the patient as noted above.     0427: I updated and reassessed the patient. He was feeling improved.     0441: I updated and reassessed the patient.      Interventions:  0338 NS 1,000 mL IV     Disposition:  The patient was discharged home.     Impression & Plan   Medical Decision Making:  Oliver is an 81-year-old man with a history of PE/DVT and atrial fibrillation on Xarelto presenting with lightheadedness.  He was in his baseline state of health when he went to bed but when he got up to urinate about an hour prior to arrival he felt \"dizzy\" which he describes as a lightheaded sensation rather than room spinning.  He stumbled a couple of times and was worried this may represent weakness associated with the stroke which prompted call the paramedics.  Fortunately, Oliver appears quite well on exam.  The most concerning feature of his physical exam is a bradycardic rate to the 40s that is irregularly irregular.  EKG confirms atrial fibrillation with slow ventricular response without ischemic changes.  Laboratory studies " "are reassuring, however, without kidney injury, electrolyte derangements, leukocytosis, or troponin elevation.  There is no lactic acidosis and TSH is normal.  To practice with an abundance of caution because he is on Xarelto, I did perform head CT.  Fortunately, there is no intracranial hemorrhage and I highly doubt a neurologic etiology for his lightheadedness and favor a cardiac etiology.  He was given IV fluids and his heart rate remained in the 40s to 60s beats per minute.  Orthostatics were negative and he was asymptomatic with these.  He actually saw his cardiology just 9 days ago and had a heart rate of 104 while ambulating.  I suspect this bradycardia is new and is likely related to his use of metoprolol as he has had to be taken off of it in the past related to bradycardia.  It was resumed at a low dose due to his moderately dilated aortic root and ascending aortic.     On reevaluation Oliver is feeling improved.  He tells me he is a \"hypochondriac\" and may have overreacted.  I am somewhat concerned by lightheadedness and symptomatic bradycardia in this elderly man who is anticoagulated.  However, he ambulated here in the emergency department and did not feel that he would fall with only slight lightheadedness and no room spinning dizziness.  We discussed possible admission but he would prefer to be discharged as he is feeling improved and because his wife has dementia and he would like to care for her.  Because he has had bradycardia with metoprolol in the past this is likely the source for his symptomatic bradycardia today.  He understands he needs to stop the metoprolol altogether and contact his cardiologist about the next step.  There is mention of pacemaker in that recent visit and I note that Holter monitor was ordered and not yet completed.  Oliver also feels he can follow-up with his primary care provider about the metoprolol and his ER visit.  I recommended he continue Xarelto and return " immediately if he has any worsening symptoms or development of any new concerns.  All questions answered.  Prefers discharge to admission.    Diagnosis:    ICD-10-CM   1. Symptomatic bradycardia  R00.1   2. Atrial fibrillation with slow ventricular response (H)  I48.91         Scribe Disclosure:  I, Abhijeet Matamoros, am serving as a scribe at 3:01 AM on 3/10/2021 to document services personally performed by Michelle Vigil MD based on my observations and the provider's statements to me.             Michelle Vigil MD  03/11/21 0004

## 2021-03-21 ENCOUNTER — HEALTH MAINTENANCE LETTER (OUTPATIENT)
Age: 81
End: 2021-03-21

## 2021-06-03 ENCOUNTER — DOCUMENTATION ONLY (OUTPATIENT)
Dept: LAB | Facility: CLINIC | Age: 81
End: 2021-06-03

## 2021-06-03 DIAGNOSIS — E78.5 HYPERLIPIDEMIA WITH TARGET LDL LESS THAN 100: Primary | ICD-10-CM

## 2021-06-08 DIAGNOSIS — E78.5 HYPERLIPIDEMIA WITH TARGET LDL LESS THAN 100: ICD-10-CM

## 2021-06-08 LAB
ALBUMIN SERPL-MCNC: 3.5 G/DL (ref 3.4–5)
ALP SERPL-CCNC: 67 U/L (ref 40–150)
ALT SERPL W P-5'-P-CCNC: 41 U/L (ref 0–70)
ANION GAP SERPL CALCULATED.3IONS-SCNC: 3 MMOL/L (ref 3–14)
AST SERPL W P-5'-P-CCNC: 42 U/L (ref 0–45)
BILIRUB SERPL-MCNC: 0.9 MG/DL (ref 0.2–1.3)
BUN SERPL-MCNC: 14 MG/DL (ref 7–30)
CALCIUM SERPL-MCNC: 9.1 MG/DL (ref 8.5–10.1)
CHLORIDE SERPL-SCNC: 108 MMOL/L (ref 94–109)
CHOLEST SERPL-MCNC: 157 MG/DL
CO2 SERPL-SCNC: 28 MMOL/L (ref 20–32)
CREAT SERPL-MCNC: 0.88 MG/DL (ref 0.66–1.25)
GFR SERPL CREATININE-BSD FRML MDRD: 80 ML/MIN/{1.73_M2}
GLUCOSE SERPL-MCNC: 107 MG/DL (ref 70–99)
HDLC SERPL-MCNC: 62 MG/DL
LDLC SERPL CALC-MCNC: 83 MG/DL
NONHDLC SERPL-MCNC: 95 MG/DL
POTASSIUM SERPL-SCNC: 4.1 MMOL/L (ref 3.4–5.3)
PROT SERPL-MCNC: 7.3 G/DL (ref 6.8–8.8)
SODIUM SERPL-SCNC: 139 MMOL/L (ref 133–144)
TRIGL SERPL-MCNC: 62 MG/DL

## 2021-06-08 PROCEDURE — 80053 COMPREHEN METABOLIC PANEL: CPT | Performed by: INTERNAL MEDICINE

## 2021-06-08 PROCEDURE — 80061 LIPID PANEL: CPT | Performed by: INTERNAL MEDICINE

## 2021-06-08 PROCEDURE — 36415 COLL VENOUS BLD VENIPUNCTURE: CPT | Performed by: INTERNAL MEDICINE

## 2021-06-14 ENCOUNTER — OFFICE VISIT (OUTPATIENT)
Dept: INTERNAL MEDICINE | Facility: CLINIC | Age: 81
End: 2021-06-14
Payer: COMMERCIAL

## 2021-06-14 VITALS
WEIGHT: 204.1 LBS | TEMPERATURE: 96.9 F | OXYGEN SATURATION: 97 % | SYSTOLIC BLOOD PRESSURE: 100 MMHG | BODY MASS INDEX: 26.2 KG/M2 | RESPIRATION RATE: 16 BRPM | DIASTOLIC BLOOD PRESSURE: 70 MMHG | HEART RATE: 63 BPM

## 2021-06-14 DIAGNOSIS — I26.99 RECURRENT PULMONARY EMBOLISM (H): ICD-10-CM

## 2021-06-14 DIAGNOSIS — Z00.00 MEDICARE ANNUAL WELLNESS VISIT, SUBSEQUENT: Primary | ICD-10-CM

## 2021-06-14 DIAGNOSIS — E78.5 HYPERLIPIDEMIA WITH TARGET LDL LESS THAN 100: ICD-10-CM

## 2021-06-14 DIAGNOSIS — I25.10 CORONARY ARTERY DISEASE INVOLVING NATIVE CORONARY ARTERY OF NATIVE HEART WITHOUT ANGINA PECTORIS: ICD-10-CM

## 2021-06-14 PROCEDURE — 99214 OFFICE O/P EST MOD 30 MIN: CPT | Mod: 25 | Performed by: INTERNAL MEDICINE

## 2021-06-14 PROCEDURE — 99397 PER PM REEVAL EST PAT 65+ YR: CPT | Performed by: INTERNAL MEDICINE

## 2021-06-14 ASSESSMENT — ACTIVITIES OF DAILY LIVING (ADL): CURRENT_FUNCTION: NO ASSISTANCE NEEDED

## 2021-06-14 NOTE — PROGRESS NOTES
"SUBJECTIVE:   Oliver Baires is a 81 year old male who presents for Preventive Visit.      Patient has been advised of split billing requirements and indicates understanding: Yes   Are you in the first 12 months of your Medicare coverage?  No    Healthy Habits:     In general, how would you rate your overall health?  Fair    Frequency of exercise:  None    Do you usually eat at least 4 servings of fruit and vegetables a day, include whole grains    & fiber and avoid regularly eating high fat or \"junk\" foods?  No    Taking medications regularly:  Yes    Medication side effects:  None    Ability to successfully perform activities of daily living:  No assistance needed    Home Safety:  No safety concerns identified    Hearing Impairment:  Difficulty following a conversation in a noisy restaurant or crowded room, feel that people are mumbling or not speaking clearly, difficulty following dialogue in the theater, difficult to understand a speaker at a public meeting or [employer] service, need to ask people to speak up or repeat themselves and difficulty understanding soft or whispered speech    In the past 6 months, have you been bothered by leaking of urine?  No    In general, how would you rate your overall mental or emotional health?  Fair      PHQ-2 Total Score: 2    Additional concerns today:  No    Do you feel safe in your environment? Yes    Have you ever done Advance Care Planning? (For example, a Health Directive, POLST, or a discussion with a medical provider or your loved ones about your wishes): Yes, patient states has an Advance Care Planning document and will bring a copy to the clinic.       Fall risk  Fallen 2 or more times in the past year?: No  Any fall with injury in the past year?: No    Cognitive Screening   1) Repeat 3 items (Leader, Season, Table)    2) Clock draw: NORMAL  3) 3 item recall:  Recalls 2 objects   Results: NORMAL clock, 1-2 items recalled: COGNITIVE IMPAIRMENT LESS LIKELY    Mini-CogTM " Copyright BCEKIE Kumari. Licensed by the author for use in [facility]; reprinted with permission (kang@[facility]). All rights reserved.      Do you have sleep apnea, excessive snoring or daytime drowsiness?: no    Reviewed and updated as needed this visit by clinical staff  Tobacco  Allergies               Reviewed and updated as needed this visit by Provider                Social History     Tobacco Use     Smoking status: Former Smoker     Packs/day: 0.00     Quit date: 1980     Years since quittin.8     Smokeless tobacco: Never Used   Substance Use Topics     Alcohol use: Yes     Alcohol/week: 0.0 standard drinks     Comment: 2 drinks day     If you drink alcohol do you typically have >3 drinks per day or >7 drinks per week? Yes      Alcohol Use 2021   Prescreen: >3 drinks/day or >7 drinks/week? Yes   Prescreen: >3 drinks/day or >7 drinks/week? -   AUDIT SCORE  8               Current providers sharing in care for this patient include:   Patient Care Team:  Renan Henry MD as PCP - General  Renan Henry MD as Assigned PCP  Tommy Villarreal MD as Assigned Heart and Vascular Provider  Evelio Linares MD as Assigned Surgical Provider    The following health maintenance items are reviewed in Epic and correct as of today:  Health Maintenance Due   Topic Date Due     ANNUAL REVIEW OF HM ORDERS  Never done     MEDICARE ANNUAL WELLNESS VISIT  10/17/2019     FALL RISK ASSESSMENT  10/17/2019       Continues on medical therapy for hypertension and hyperlipidemia, in context of history of coronary artery disease.  Cholesterol is reasonably well controlled.  Also continues on chronic anticoagulation for history of recurrent pulmonary embolism.        Review of Systems  Constitutional, HEENT, cardiovascular, pulmonary, GI, , musculoskeletal, neuro, skin, endocrine and psych systems are negative, except as otherwise noted.    OBJECTIVE:   /70 (BP Location: Left arm,  "Patient Position: Chair, Cuff Size: Adult Large)   Pulse 63   Temp 96.9  F (36.1  C) (Temporal)   Resp 16   Wt 92.6 kg (204 lb 1.6 oz)   SpO2 97%   BMI 26.20 kg/m   Estimated body mass index is 26.2 kg/m  as calculated from the following:    Height as of 3/1/21: 1.88 m (6' 2\").    Weight as of this encounter: 92.6 kg (204 lb 1.6 oz).  Physical Exam  GENERAL: healthy, alert and no distress  NECK: no adenopathy, no asymmetry, masses, or scars and thyroid normal to palpation  RESP: lungs clear to auscultation - no rales, rhonchi or wheezes  CV: regular rate and rhythm, normal S1 S2, no S3 or S4, no murmur, click or rub, no peripheral edema and peripheral pulses strong  ABDOMEN: soft, nontender, no hepatosplenomegaly, no masses and bowel sounds normal  MS: no gross musculoskeletal defects noted, no edema        ASSESSMENT / PLAN:   1. Medicare annual wellness visit, subsequent      2. Recurrent pulmonary embolism (H)  Continue indefinite anticoagulation    3. Coronary artery disease involving native coronary artery of native heart without angina pectoris  Stable, continue RF modification    4. Hyperlipidemia with target LDL less than 100  Stable, well-controlled      Patient has been advised of split billing requirements and indicates understanding: Yes  COUNSELING:  Reviewed preventive health counseling, as reflected in patient instructions    Estimated body mass index is 26.2 kg/m  as calculated from the following:    Height as of 3/1/21: 1.88 m (6' 2\").    Weight as of this encounter: 92.6 kg (204 lb 1.6 oz).        He reports that he quit smoking about 40 years ago. He smoked 0.00 packs per day. He has never used smokeless tobacco.      Appropriate preventive services were discussed with this patient, including applicable screening as appropriate for cardiovascular disease, diabetes, osteopenia/osteoporosis, and glaucoma.  As appropriate for age/gender, discussed screening for colorectal cancer, prostate " cancer, breast cancer, and cervical cancer. Checklist reviewing preventive services available has been given to the patient.    Reviewed patients plan of care and provided an AVS. The Basic Care Plan (routine screening as documented in Health Maintenance) for Oliver meets the Care Plan requirement. This Care Plan has been established and reviewed with the Patient.    Counseling Resources:  ATP IV Guidelines  Pooled Cohorts Equation Calculator  Breast Cancer Risk Calculator  Breast Cancer: Medication to Reduce Risk  FRAX Risk Assessment  ICSI Preventive Guidelines  Dietary Guidelines for Americans, 2010  USDA's MyPlate  ASA Prophylaxis  Lung CA Screening    Renan Henry MD  [facility]    Identified Health Risks:

## 2021-08-11 DIAGNOSIS — I77.810 AORTIC ROOT DILATION (H): ICD-10-CM

## 2021-08-11 RX ORDER — METOPROLOL SUCCINATE 25 MG/1
12.5 TABLET, EXTENDED RELEASE ORAL DAILY
Qty: 45 TABLET | Refills: 2 | Status: SHIPPED | OUTPATIENT
Start: 2021-08-11 | End: 2022-05-05

## 2021-09-04 ENCOUNTER — HEALTH MAINTENANCE LETTER (OUTPATIENT)
Age: 81
End: 2021-09-04

## 2021-09-26 DIAGNOSIS — I26.99 RECURRENT PULMONARY EMBOLISM (H): ICD-10-CM

## 2021-09-28 RX ORDER — RIVAROXABAN 20 MG/1
TABLET, FILM COATED ORAL
Qty: 90 TABLET | Refills: 3 | Status: SHIPPED | OUTPATIENT
Start: 2021-09-28 | End: 2022-10-10

## 2021-09-28 NOTE — TELEPHONE ENCOUNTER
Routing refill request to provider for review/approval because:  Direct Oral Anticoagulant Agents [id number]/26/2021 12:18 AM   Creatinine Clearance greater than 50 ml/min on file in past 3 mos Protocol Details    Serum creatinine less than or equal to 1.4 on file in past 3 mos

## 2021-10-12 ENCOUNTER — E-VISIT (OUTPATIENT)
Dept: URGENT CARE | Facility: CLINIC | Age: 81
End: 2021-10-12
Payer: COMMERCIAL

## 2021-10-12 DIAGNOSIS — Z20.822 CLOSE EXPOSURE TO 2019 NOVEL CORONAVIRUS: Primary | ICD-10-CM

## 2021-10-12 PROCEDURE — 99421 OL DIG E/M SVC 5-10 MIN: CPT | Performed by: NURSE PRACTITIONER

## 2021-10-13 ENCOUNTER — LAB (OUTPATIENT)
Dept: URGENT CARE | Facility: URGENT CARE | Age: 81
End: 2021-10-13
Attending: NURSE PRACTITIONER
Payer: COMMERCIAL

## 2021-10-13 DIAGNOSIS — Z20.822 CLOSE EXPOSURE TO 2019 NOVEL CORONAVIRUS: ICD-10-CM

## 2021-10-13 LAB — SARS-COV-2 RNA RESP QL NAA+PROBE: NEGATIVE

## 2021-10-13 PROCEDURE — U0003 INFECTIOUS AGENT DETECTION BY NUCLEIC ACID (DNA OR RNA); SEVERE ACUTE RESPIRATORY SYNDROME CORONAVIRUS 2 (SARS-COV-2) (CORONAVIRUS DISEASE [COVID-19]), AMPLIFIED PROBE TECHNIQUE, MAKING USE OF HIGH THROUGHPUT TECHNOLOGIES AS DESCRIBED BY CMS-2020-01-R: HCPCS

## 2021-10-13 PROCEDURE — U0005 INFEC AGEN DETEC AMPLI PROBE: HCPCS

## 2021-10-13 NOTE — PATIENT INSTRUCTIONS
"  Dear Oliver Baires,    Based on your exposure to COVID-19 (coronavirus), we would like to test you for this virus. I have placed an order for this test.The best time for testing is 5-7 days after the exposure.    How to schedule:  Go to your [employer] home page and scroll down to the section that says  You have an appointment that needs to be scheduled  and click the large green button that says  Schedule Now  and follow the steps to find the next available opening.     If you are unable to complete these [employer] scheduling steps, please call [phone] to schedule your testing.     Return to work/school/ guidance:   For people with high risk exposures outside the home    Please let your workplace manager and staffing office know when your quarantine ends.     We can not give you an exact date as it depends on the information below. You can calculate this on your own or work with your manager/staffing office to calculate this. (For example if you were exposed on 10/4, you would have to quarantine for 14 full days. That would be through 10/18. You could return on 10/19.)    Quarantine Guidelines:  Patients (\"contacts\") who have been in close prolonged contact of an infected person(s) (within six feet for at least 15 minutes within a 24 hour period), and remain asymptomatic should enter quarantine based on the following options:    14-day quarantine period (this remains the CDC recommendation for the greatest protection against spread of COVID-19) OR    Minimum 7-day quarantine with negative RT-PCR test collected on day 5 or later OR    10-day quarantine with no test  Quarantine Guideline exceptions are as follows:    People who have been fully vaccinated do not need to quarantine if the exposure was at least 2 weeks after the last vaccination. This includes vaccinated health care workers.    Not fully vaccinated and unvaccinated Individuals who work in health care, congregate care, or congregate living should " be off work for 14 days from their last date of exposure. Community activities for this group can be resumed based on options above. Fully vaccinated individuals in this group do not need to quarantine from work after exposure.    Not fully vaccinated and unvaccinated people whose high-risk exposure was a household member should always quarantine for 14 days from their last date of exposure. Fully vaccinated people in this category do not need to quarantine.    Not fully vaccinated or unvaccinated residents of congregate care and congregate living settings should always quarantine for 14 days from their last date of exposure. Fully vaccinated residents do not need to quarantine.  Note: If you have ongoing exposure to the covid positive person, this quarantine period may be more than 14 days. (For example, if you are continued to be exposed to your child who tested positive and cannot isolate from them, then the quarantine of 7-14 days can't start until your child is no longer contagious. This is typically 10 days from onset of the child's symptoms. So the total duration may be 17-24 days in this case.)    You should continue symptom monitoring until day 14 post-exposure. If you develop signs or symptoms of COVID-19, isolate and get tested (even if you have been tested already).    How to quarantine:   Stay home and away from others. Don't go to school or anywhere else. Generally quarantine means staying home from work but there are some exceptions to this. Please contact your workplace.  No hugging, kissing or shaking hands.  Don't let anyone visit.  Cover your mouth and nose with a mask, tissue or washcloth to avoid spreading germs.  Wash your hands and face often. Use soap and water.    What are the symptoms of COVID-19?  The most common symptoms are cough, fever and trouble breathing. Less common symptoms include headache, body aches, fatigue (feeling very tired), chills, sore throat, stuffy or runny nose,  diarrhea (loose poop), loss of taste or smell, belly pain, and nausea or vomiting (feeling sick to your stomach or throwing up).  After 14 days, if you have still don't have symptoms, you likely don't have this virus.  If you develop symptoms, follow these guidelines.  If you're normally healthy: Please start another eVisit.  If you have a serious health problem (like cancer, heart failure, an organ transplant or kidney disease): Call your specialty clinic. Let them know that you might have COVID-19.    Where can I get more information?  [facility] - About COVID-19: [email or web address]  CDC - What to Do If You're Sick: www.cdc.gov/coronavirus/2019-ncov/about/steps-when-sick.html  CDC - Ending Home Isolation: www.cdc.gov/coronavirus/2019-ncov/hcp/disposition-in-home-patients.html  CDC - Caring for Someone: www.cdc.gov/coronavirus/2019-ncov/if-you-are-sick/care-for-someone.html  [facility] clinical trials (COVID-19 research studies): clinicalaffairs.[facility]-clinical-trials  Below are the COVID-19 hotlines at the [place] Department of Health ([facility]). Interpreters are available.  For health questions: Call 989-414-9250 or 1-521.812.9375 (7 a.m. to 7 p.m.)  For questions about schools and childcare: Call 572-349-9688 or 1-570.790.5193 (7 a.m. to 7 p.m.)

## 2021-11-16 DIAGNOSIS — E78.5 HYPERLIPIDEMIA LDL GOAL <100: ICD-10-CM

## 2021-11-16 RX ORDER — ATORVASTATIN CALCIUM 80 MG/1
TABLET, FILM COATED ORAL
Qty: 90 TABLET | Refills: 1 | Status: SHIPPED | OUTPATIENT
Start: 2021-11-16 | End: 2022-05-19

## 2021-11-16 NOTE — TELEPHONE ENCOUNTER
Prescription approved per [facility] Refill Protocol.    Joi Lopez, MSN, RN   [facility]

## 2021-12-27 DIAGNOSIS — I10 ESSENTIAL HYPERTENSION: ICD-10-CM

## 2021-12-28 RX ORDER — LISINOPRIL 5 MG/1
TABLET ORAL
Qty: 90 TABLET | Refills: 1 | Status: SHIPPED | OUTPATIENT
Start: 2021-12-28 | End: 2022-07-05

## 2022-01-13 ENCOUNTER — LAB (OUTPATIENT)
Dept: URGENT CARE | Facility: URGENT CARE | Age: 82
End: 2022-01-13
Attending: FAMILY MEDICINE
Payer: COMMERCIAL

## 2022-01-13 DIAGNOSIS — Z20.822 SUSPECTED 2019 NOVEL CORONAVIRUS INFECTION: ICD-10-CM

## 2022-01-13 LAB — SARS-COV-2 RNA RESP QL NAA+PROBE: NEGATIVE

## 2022-01-13 PROCEDURE — U0005 INFEC AGEN DETEC AMPLI PROBE: HCPCS

## 2022-01-13 PROCEDURE — U0003 INFECTIOUS AGENT DETECTION BY NUCLEIC ACID (DNA OR RNA); SEVERE ACUTE RESPIRATORY SYNDROME CORONAVIRUS 2 (SARS-COV-2) (CORONAVIRUS DISEASE [COVID-19]), AMPLIFIED PROBE TECHNIQUE, MAKING USE OF HIGH THROUGHPUT TECHNOLOGIES AS DESCRIBED BY CMS-2020-01-R: HCPCS

## 2022-04-06 ENCOUNTER — IMMUNIZATION (OUTPATIENT)
Dept: NURSING | Facility: CLINIC | Age: 82
End: 2022-04-06
Payer: COMMERCIAL

## 2022-04-06 PROCEDURE — 91305 COVID-19,PF,PFIZER (12+ YRS): CPT

## 2022-04-06 PROCEDURE — 0054A COVID-19,PF,PFIZER (12+ YRS): CPT

## 2022-05-05 DIAGNOSIS — I77.810 AORTIC ROOT DILATION (H): ICD-10-CM

## 2022-05-05 RX ORDER — METOPROLOL SUCCINATE 25 MG/1
12.5 TABLET, EXTENDED RELEASE ORAL DAILY
Qty: 45 TABLET | Refills: 0 | Status: SHIPPED | OUTPATIENT
Start: 2022-05-05 | End: 2022-09-06

## 2022-05-18 DIAGNOSIS — E78.5 HYPERLIPIDEMIA LDL GOAL <100: ICD-10-CM

## 2022-05-19 RX ORDER — ATORVASTATIN CALCIUM 80 MG/1
TABLET, FILM COATED ORAL
Qty: 90 TABLET | Refills: 0 | Status: SHIPPED | OUTPATIENT
Start: 2022-05-19 | End: 2022-08-23

## 2022-05-31 ENCOUNTER — HOSPITAL ENCOUNTER (OUTPATIENT)
Dept: CARDIOLOGY | Facility: CLINIC | Age: 82
Discharge: HOME | End: 2022-05-31
Attending: INTERNAL MEDICINE | Admitting: INTERNAL MEDICINE
Payer: COMMERCIAL

## 2022-05-31 DIAGNOSIS — I71.20 THORACIC AORTIC ANEURYSM WITHOUT RUPTURE (H): ICD-10-CM

## 2022-05-31 DIAGNOSIS — I25.10 CORONARY ARTERY DISEASE INVOLVING NATIVE CORONARY ARTERY OF NATIVE HEART WITHOUT ANGINA PECTORIS: ICD-10-CM

## 2022-05-31 DIAGNOSIS — I48.21 PERMANENT ATRIAL FIBRILLATION (H): ICD-10-CM

## 2022-05-31 LAB — LVEF ECHO: NORMAL

## 2022-05-31 PROCEDURE — 255N000002 HC RX 255 OP 636: Performed by: INTERNAL MEDICINE

## 2022-05-31 PROCEDURE — 999N000208 ECHOCARDIOGRAM COMPLETE

## 2022-05-31 PROCEDURE — 93306 TTE W/DOPPLER COMPLETE: CPT | Mod: 26 | Performed by: INTERNAL MEDICINE

## 2022-05-31 RX ADMIN — HUMAN ALBUMIN MICROSPHERES AND PERFLUTREN 9 ML: 10; .22 INJECTION, SOLUTION INTRAVENOUS at 13:56

## 2022-06-01 ENCOUNTER — HOSPITAL ENCOUNTER (OUTPATIENT)
Dept: CARDIOLOGY | Facility: CLINIC | Age: 82
Discharge: HOME | End: 2022-06-01
Attending: INTERNAL MEDICINE | Admitting: INTERNAL MEDICINE
Payer: COMMERCIAL

## 2022-06-01 DIAGNOSIS — I71.20 THORACIC AORTIC ANEURYSM WITHOUT RUPTURE (H): ICD-10-CM

## 2022-06-01 DIAGNOSIS — I48.21 PERMANENT ATRIAL FIBRILLATION (H): ICD-10-CM

## 2022-06-01 DIAGNOSIS — I25.10 CORONARY ARTERY DISEASE INVOLVING NATIVE CORONARY ARTERY OF NATIVE HEART WITHOUT ANGINA PECTORIS: ICD-10-CM

## 2022-06-01 PROCEDURE — 93227 XTRNL ECG REC<48 HR R&I: CPT | Performed by: INTERNAL MEDICINE

## 2022-06-01 PROCEDURE — 93226 XTRNL ECG REC<48 HR SCAN A/R: CPT

## 2022-06-02 ENCOUNTER — OFFICE VISIT (OUTPATIENT)
Dept: CARDIOLOGY | Facility: CLINIC | Age: 82
End: 2022-06-02
Payer: COMMERCIAL

## 2022-06-02 VITALS
HEART RATE: 72 BPM | HEIGHT: 74 IN | WEIGHT: 193 LBS | BODY MASS INDEX: 24.77 KG/M2 | DIASTOLIC BLOOD PRESSURE: 77 MMHG | SYSTOLIC BLOOD PRESSURE: 123 MMHG

## 2022-06-02 DIAGNOSIS — I25.10 CORONARY ARTERY DISEASE INVOLVING NATIVE CORONARY ARTERY OF NATIVE HEART WITHOUT ANGINA PECTORIS: ICD-10-CM

## 2022-06-02 DIAGNOSIS — I71.20 THORACIC AORTIC ANEURYSM WITHOUT RUPTURE (H): ICD-10-CM

## 2022-06-02 DIAGNOSIS — I48.21 PERMANENT ATRIAL FIBRILLATION (H): ICD-10-CM

## 2022-06-02 PROCEDURE — 99213 OFFICE O/P EST LOW 20 MIN: CPT | Performed by: INTERNAL MEDICINE

## 2022-06-02 NOTE — PROGRESS NOTES
Service Date: 2022    CARDIOLOGY OFFICE NOTE    HISTORY OF PRESENT ILLNESS:  Oliver Baires returns for followup.  He is a delightful 82-year-old man.  He is accompanied by his wife.  He is in atrial fibrillation but never noticed it.  Coincidentally, he was already on a NOAC because of a DVT and pulmonary embolism, so he is covered there.  We had him on a beta blocker.  He was bradycardic, so we stopped it but he does have an aortic aneurysm, so I put him on mini-dose beta blocker.  There was a Holter monitor but it was just turned in today, so we do not have those results.  We will have our nurse call him with those results.  We are looking to make sure his heart rate is not too high or too low with the atrial fib.  Again, he does not feel it at all.  We reviewed his echocardiogram.  Left ventricular size and function is normal.  Heart valves are normal.  The right heart is mildly dilated which is probably due to his previous history of pulmonary embolism.  His aorta at the sinus was 47 mm and at the ascending was 41 mm and that compares very favorably to the CAT scan 2 years ago, that the sinus was 48 mm and the ascending 41.  This was all reviewed with the patient today.  He is due for lipids and electrolytes but he is also due for his annual physical with Dr. Henry, so I told the patient, and I gave him a note, we would like to see a lipid profile and a BMP since he is on lisinopril and if Dr. Henry does not see him in the near future, he can call us and we will order it but I did tell him he is due for his annual visit with Dr. Henry.  Patient himself is feeling great with no new problems.    Today's visit was 23 minutes.  We reviewed the echocardiogram.  We compared it to his old aorta regarding his aortic aneurysm.  We answered the questions.  His mother  at age 92 of an abdominal aortic aneurysm.  She did not want surgery, so he certainly understands the risk.  We talked about blood thinner and his  other medications.  I told him aerobic walking, running and biking is good exercise but weight lifting where it is a sudden swing in blood pressure, we would like to avoid with his aneurysm.    We will see the patient back in 1 year.    Tommy Villarreal MD     cc:  Renan Henry MD   [facility]  [address] [address]    Tommy Villarreal MD        D: 2022   T: 2022   MT: jose d    Name:     DIANA ZHAO  MRN:      [id number]        Account:      [id number]   :      1940           Service Date: 2022       Document: [id number]

## 2022-06-02 NOTE — PROGRESS NOTES
HPI and Plan:   See dictation    No orders of the defined types were placed in this encounter.    Orders Placed This Encounter   Medications     UNABLE TO FIND     Sig: Take by mouth 2 times daily MEDICATION NAME: Preservation vitamin pills.     There are no discontinued medications.      Encounter Diagnoses   Name Primary?     Coronary artery disease involving native coronary artery of native heart without angina pectoris      Permanent atrial fibrillation (H)      Thoracic aortic aneurysm without rupture (H)        CURRENT MEDICATIONS:  Current Outpatient Medications   Medication Sig Dispense Refill     atorvastatin (LIPITOR) 80 MG tablet TAKE 1 TABLET BY MOUTH EVERY DAY 90 tablet 0     Cholecalciferol (VITAMIN D PO) Take 1,000 Units by mouth daily        lisinopril (ZESTRIL) 5 MG tablet TAKE 1 TABLET BY MOUTH EVERY DAY 90 tablet 1     metoprolol succinate ER (TOPROL XL) 25 MG 24 hr tablet Take 0.5 tablets (12.5 mg) by mouth daily 45 tablet 0     UNABLE TO FIND Take by mouth 2 times daily MEDICATION NAME: Preservation vitamin pills.       XARELTO ANTICOAGULANT 20 MG TABS tablet TAKE 1 TABLET BY MOUTH DAILY WITH DINNER 90 tablet 3     nitroGLYcerin (NITROSTAT) 0.4 MG sublingual tablet Place 1 tablet (0.4 mg) under the tongue every 5 minutes as needed for chest pain up to 3 tablets per episode. (Patient not taking: Reported on 6/2/2022) 60 tablet 1       ALLERGIES     Allergies   Allergen Reactions     Metoprolol      Symptomatic bradycardia       PAST MEDICAL HISTORY:  Past Medical History:   Diagnosis Date     Aortic root dilatation (H) 12/31/2019    chest CTA     Arthritis     Left knee-L knee injury     AV block      Bradycardia     not on bb due to low HR     CAD (coronary artery disease)     Cath 6/2010- angioplasty & Xience 3.0x15mm stent to prox LAD; STEMI 10/2004- 3.5 x 33mm Cypher ALEJANDRA to distal RCA, angioplasty to VINCENT, Lcx25%, Lma 25%     DVT (deep venous thrombosis) (H)     on long term noac due to dvtx2  and now afib     ED (erectile dysfunction)      GERD (gastroesophageal reflux disease)      Hyperlipidemia      Hypertension      IBS (irritable bowel syndrome)      Impaired fasting glucose      Liver cyst 2017    right lower lobe     MI (myocardial infarction) (H)     STEMI 10/2004     Mild ascending aorta dilatation (H) 2019    chest CTA     Permanent atrial fibrillation (H)      Personal history of colonic polyps     Tubular adenomas excised ,      Pneumonia 2004    pleural effusion     Pulmonary emboli (H) 2017    recurrent PE when off anticoag-now lifelong anticoag (unprovoked after car ride)- right leg dvt     Right ventricular dilation     suspect some sleep apnea, pt declines any sleep eval and states would not wear cpap anyway     Skin cancer      Squamous cell carcinoma        PAST SURGICAL HISTORY:  Past Surgical History:   Procedure Laterality Date     CHOLECYSTECTOMY       HC REMOVAL GALLBLADDER       HEART CATH, ANGIOPLASTY  2010    Angioplasty & Xience 3.0x15mm stent to prox LAD     HEART CATH, ANGIOPLASTY  10/30/2004    3.5x33mm Cypher stent to distal RCA; angioplasty to VINCENT     SURGICAL HISTORY OF -       Angioplasty/stenting of RCA     ZZ COLONOSCOPY THRU STOMA, DIAGNOSTIC      Single tubular adenoma excised     ZZ COLONOSCOPY THRU STOMA, DIAGNOSTIC      Tubular adenoma, hepatic flexure.       FAMILY HISTORY:  Family History   Problem Relation Age of Onset     Cancer Father         Pancreatic CA,  age 82     Cardiovascular Mother          of ruptured AAA age 92     Cancer Brother         liver ca       SOCIAL HISTORY:  Social History     Socioeconomic History     Marital status:      Spouse name: None     Number of children: 2     Years of education: None     Highest education level: None   Occupational History     Employer: RETIRED   Tobacco Use     Smoking status: Former Smoker     Packs/day: 0.00     Quit date: 1980     Years since  "quittin.8     Smokeless tobacco: Never Used   Substance and Sexual Activity     Alcohol use: Yes     Alcohol/week: 0.0 standard drinks     Comment: 2 drinks day     Drug use: No     Sexual activity: Yes   Other Topics Concern     Caffeine Concern No     Comment: 3 cups in am     Weight Concern No     Special Diet No     Exercise Yes     Comment: 1-2 x week     Seat Belt Yes       Review of Systems:  Skin:        Eyes:       ENT:       Respiratory:       Cardiovascular:       Gastroenterology:      Genitourinary:       Musculoskeletal:       Neurologic:       Psychiatric:       Heme/Lymph/Imm:       Endocrine:         Physical Exam:  Vitals: /77 (BP Location: Left arm, Cuff Size: Adult Large)   Pulse 72   Ht 1.88 m (6' 2\")   Wt 87.5 kg (193 lb)   BMI 24.78 kg/m      Constitutional:           Skin:             Head:           Eyes:           Lymph:      ENT:           Neck:           Respiratory:            Cardiac:                                                           GI:           Extremities and Muscular Skeletal:                 Neurological:           Psych:         Recent Lab Results:  LIPID RESULTS:  Lab Results   Component Value Date    CHOL 157 2021    HDL 62 2021    LDL 83 2021    TRIG 62 2021    CHOLHDLRATIO 3.1 10/29/2015       LIVER ENZYME RESULTS:  Lab Results   Component Value Date    AST 42 2021    ALT 41 2021       CBC RESULTS:  Lab Results   Component Value Date    WBC 5.6 03/10/2021    RBC 4.00 (L) 03/10/2021    HGB 13.0 (L) 03/10/2021    HCT 39.6 (L) 03/10/2021    MCV 99 03/10/2021    MCH 32.5 03/10/2021    MCHC 32.8 03/10/2021    RDW 13.2 03/10/2021     03/10/2021       BMP RESULTS:  Lab Results   Component Value Date     2021    POTASSIUM 4.1 2021    CHLORIDE 108 2021    CO2 28 2021    ANIONGAP 3 2021     (H) 2021    BUN 14 2021    CR 0.88 2021    GFRESTIMATED 80 2021 "    GFRESTBLACK >90 06/08/2021    DALIA 9.1 06/08/2021        A1C RESULTS:  No results found for: A1C    INR RESULTS:  Lab Results   Component Value Date    INR 0.96 10/24/2017    INR 0.98 06/23/2010           CC  Referred Self, MD  No address on file

## 2022-06-02 NOTE — LETTER
6/2/2022    Renan Herny MD  [address] [address]    RE: Oliver Baires       Dear Colleague,     I had the pleasure of seeing Oliver Baires in the [facility].  HPI and Plan:   See dictation    No orders of the defined types were placed in this encounter.    Orders Placed This Encounter   Medications     UNABLE TO FIND     Sig: Take by mouth 2 times daily MEDICATION NAME: Preservation vitamin pills.     There are no discontinued medications.      Encounter Diagnoses   Name Primary?     Coronary artery disease involving native coronary artery of native heart without angina pectoris      Permanent atrial fibrillation (H)      Thoracic aortic aneurysm without rupture (H)        CURRENT MEDICATIONS:  Current Outpatient Medications   Medication Sig Dispense Refill     atorvastatin (LIPITOR) 80 MG tablet TAKE 1 TABLET BY MOUTH EVERY DAY 90 tablet 0     Cholecalciferol (VITAMIN D PO) Take 1,000 Units by mouth daily        lisinopril (ZESTRIL) 5 MG tablet TAKE 1 TABLET BY MOUTH EVERY DAY 90 tablet 1     metoprolol succinate ER (TOPROL XL) 25 MG 24 hr tablet Take 0.5 tablets (12.5 mg) by mouth daily 45 tablet 0     UNABLE TO FIND Take by mouth 2 times daily MEDICATION NAME: Preservation vitamin pills.       XARELTO ANTICOAGULANT 20 MG TABS tablet TAKE 1 TABLET BY MOUTH DAILY WITH DINNER 90 tablet 3     nitroGLYcerin (NITROSTAT) 0.4 MG sublingual tablet Place 1 tablet (0.4 mg) under the tongue every 5 minutes as needed for chest pain up to 3 tablets per episode. (Patient not taking: Reported on 6/2/2022) 60 tablet 1       ALLERGIES     Allergies   Allergen Reactions     Metoprolol      Symptomatic bradycardia       PAST MEDICAL HISTORY:  Past Medical History:   Diagnosis Date     Aortic root dilatation (H) 12/31/2019    chest CTA     Arthritis     Left knee-L knee injury     AV block      Bradycardia     not on bb due to low HR     CAD (coronary artery disease)     Cath 6/2010-  angioplasty & Xience 3.0x15mm stent to prox LAD; STEMI 10/2004- 3.5 x 33mm Cypher ALEJANDRA to distal RCA, angioplasty to VINCENT, Lcx25%, Lma 25%     DVT (deep venous thrombosis) (H)     on long term noac due to dvtx2 and now afib     ED (erectile dysfunction)      GERD (gastroesophageal reflux disease)      Hyperlipidemia      Hypertension      IBS (irritable bowel syndrome)      Impaired fasting glucose      Liver cyst 2017    right lower lobe     MI (myocardial infarction) (H)     STEMI 10/2004     Mild ascending aorta dilatation (H) 2019    chest CTA     Permanent atrial fibrillation (H)      Personal history of colonic polyps     Tubular adenomas excised ,      Pneumonia 2004    pleural effusion     Pulmonary emboli (H) 2017    recurrent PE when off anticoag-now lifelong anticoag (unprovoked after car ride)- right leg dvt     Right ventricular dilation     suspect some sleep apnea, pt declines any sleep eval and states would not wear cpap anyway     Skin cancer      Squamous cell carcinoma        PAST SURGICAL HISTORY:  Past Surgical History:   Procedure Laterality Date     CHOLECYSTECTOMY       HC REMOVAL GALLBLADDER       HEART CATH, ANGIOPLASTY  2010    Angioplasty & Xience 3.0x15mm stent to prox LAD     HEART CATH, ANGIOPLASTY  10/30/2004    3.5x33mm Cypher stent to distal RCA; angioplasty to VINCENT     SURGICAL HISTORY OF -       Angioplasty/stenting of RCA     ZZ COLONOSCOPY THRU STOMA, DIAGNOSTIC      Single tubular adenoma excised     ZZ COLONOSCOPY THRU STOMA, DIAGNOSTIC      Tubular adenoma, hepatic flexure.       FAMILY HISTORY:  Family History   Problem Relation Age of Onset     Cancer Father         Pancreatic CA,  age 82     Cardiovascular Mother          of ruptured AAA age 92     Cancer Brother         liver ca       SOCIAL HISTORY:  Social History     Socioeconomic History     Marital status:      Spouse name: None     Number of children: 2     Years of  "education: None     Highest education level: None   Occupational History     Employer: RETIRED   Tobacco Use     Smoking status: Former Smoker     Packs/day: 0.00     Quit date: 1980     Years since quittin.8     Smokeless tobacco: Never Used   Substance and Sexual Activity     Alcohol use: Yes     Alcohol/week: 0.0 standard drinks     Comment: 2 drinks day     Drug use: No     Sexual activity: Yes   Other Topics Concern     Caffeine Concern No     Comment: 3 cups in am     Weight Concern No     Special Diet No     Exercise Yes     Comment: 1-2 x week     Seat Belt Yes       Review of Systems:  Skin:        Eyes:       ENT:       Respiratory:       Cardiovascular:       Gastroenterology:      Genitourinary:       Musculoskeletal:       Neurologic:       Psychiatric:       Heme/Lymph/Imm:       Endocrine:         Physical Exam:  Vitals: /77 (BP Location: Left arm, Cuff Size: Adult Large)   Pulse 72   Ht 1.88 m (6' 2\")   Wt 87.5 kg (193 lb)   BMI 24.78 kg/m      Constitutional:           Skin:             Head:           Eyes:           Lymph:      ENT:           Neck:           Respiratory:            Cardiac:                                                           GI:           Extremities and Muscular Skeletal:                 Neurological:           Psych:         Recent Lab Results:  LIPID RESULTS:  Lab Results   Component Value Date    CHOL 157 2021    HDL 62 2021    LDL 83 2021    TRIG 62 2021    CHOLHDLRATIO 3.1 10/29/2015       LIVER ENZYME RESULTS:  Lab Results   Component Value Date    AST 42 2021    ALT 41 2021       CBC RESULTS:  Lab Results   Component Value Date    WBC 5.6 03/10/2021    RBC 4.00 (L) 03/10/2021    HGB 13.0 (L) 03/10/2021    HCT 39.6 (L) 03/10/2021    MCV 99 03/10/2021    MCH 32.5 03/10/2021    MCHC 32.8 03/10/2021    RDW 13.2 03/10/2021     03/10/2021       BMP RESULTS:  Lab Results   Component Value Date     " 06/08/2021    POTASSIUM 4.1 06/08/2021    CHLORIDE 108 06/08/2021    CO2 28 06/08/2021    ANIONGAP 3 06/08/2021     (H) 06/08/2021    BUN 14 06/08/2021    CR 0.88 06/08/2021    GFRESTIMATED 80 06/08/2021    GFRESTBLACK >90 06/08/2021    DALIA 9.1 06/08/2021        A1C RESULTS:  No results found for: A1C    INR RESULTS:  Lab Results   Component Value Date    INR 0.96 10/24/2017    INR 0.98 06/23/2010           CC  Referred Self, MD  No address on file    Service Date: 06/02/2022    CARDIOLOGY OFFICE NOTE    HISTORY OF PRESENT ILLNESS:  Oliver Baires returns for followup.  He is a delightful 82-year-old man.  He is accompanied by his wife.  He is in atrial fibrillation but never noticed it.  Coincidentally, he was already on a NOAC because of a DVT and pulmonary embolism, so he is covered there.  We had him on a beta blocker.  He was bradycardic, so we stopped it but he does have an aortic aneurysm, so I put him on mini-dose beta blocker.  There was a Holter monitor but it was just turned in today, so we do not have those results.  We will have our nurse call him with those results.  We are looking to make sure his heart rate is not too high or too low with the atrial fib.  Again, he does not feel it at all.  We reviewed his echocardiogram.  Left ventricular size and function is normal.  Heart valves are normal.  The right heart is mildly dilated which is probably due to his previous history of pulmonary embolism.  His aorta at the sinus was 47 mm and at the ascending was 41 mm and that compares very favorably to the CAT scan 2 years ago, that the sinus was 48 mm and the ascending 41.  This was all reviewed with the patient today.  He is due for lipids and electrolytes but he is also due for his annual physical with Dr. Henry, so I told the patient, and I gave him a note, we would like to see a lipid profile and a BMP since he is on lisinopril and if Dr. Henry does not see him in the near future, he can call us  and we will order it but I did tell him he is due for his annual visit with Dr. Henry.  Patient himself is feeling great with no new problems.    Today's visit was 23 minutes.  We reviewed the echocardiogram.  We compared it to his old aorta regarding his aortic aneurysm.  We answered the questions.  His mother  at age 92 of an abdominal aortic aneurysm.  She did not want surgery, so he certainly understands the risk.  We talked about blood thinner and his other medications.  I told him aerobic walking, running and biking is good exercise but weight lifting where it is a sudden swing in blood pressure, we would like to avoid with his aneurysm.    We will see the patient back in 1 year.    Tommy Villarreal MD     cc:  Renan Henry MD   [facility] (formerly [facility]3]  [address] [place] [address]    Tommy Villarreal MD        D: 2022   T: 2022   MT: dw    Name:     DIANA ZHAO  MRN:      [id number]        Account:      [id number]   :      1940           Service Date: 2022       Document: [id number]      Thank you for allowing me to participate in the care of your patient.      Sincerely,     Tommy Vlilarreal MD     [facility]  cc:   Referred Self,

## 2022-06-27 ENCOUNTER — ANCILLARY PROCEDURE (OUTPATIENT)
Dept: GENERAL RADIOLOGY | Facility: CLINIC | Age: 82
End: 2022-06-27
Attending: PHYSICIAN ASSISTANT
Payer: COMMERCIAL

## 2022-06-27 ENCOUNTER — HOSPITAL ENCOUNTER (OUTPATIENT)
Dept: CT IMAGING | Facility: CLINIC | Age: 82
Discharge: HOME | End: 2022-06-27
Attending: PHYSICIAN ASSISTANT | Admitting: PHYSICIAN ASSISTANT
Payer: COMMERCIAL

## 2022-06-27 ENCOUNTER — OFFICE VISIT (OUTPATIENT)
Dept: URGENT CARE | Facility: URGENT CARE | Age: 82
End: 2022-06-27
Payer: COMMERCIAL

## 2022-06-27 VITALS
WEIGHT: 193 LBS | OXYGEN SATURATION: 100 % | DIASTOLIC BLOOD PRESSURE: 70 MMHG | SYSTOLIC BLOOD PRESSURE: 138 MMHG | BODY MASS INDEX: 24.78 KG/M2 | RESPIRATION RATE: 16 BRPM | HEART RATE: 60 BPM

## 2022-06-27 DIAGNOSIS — S09.90XA CLOSED HEAD INJURY, INITIAL ENCOUNTER: Primary | ICD-10-CM

## 2022-06-27 DIAGNOSIS — Z79.01 LONG TERM CURRENT USE OF ANTICOAGULANT THERAPY: ICD-10-CM

## 2022-06-27 DIAGNOSIS — S69.92XA INJURY OF FINGER OF LEFT HAND, INITIAL ENCOUNTER: ICD-10-CM

## 2022-06-27 DIAGNOSIS — S69.91XA INJURY OF FINGER OF RIGHT HAND, INITIAL ENCOUNTER: ICD-10-CM

## 2022-06-27 DIAGNOSIS — S09.90XA CLOSED HEAD INJURY, INITIAL ENCOUNTER: ICD-10-CM

## 2022-06-27 PROCEDURE — 73140 X-RAY EXAM OF FINGER(S): CPT | Mod: TC | Performed by: RADIOLOGY

## 2022-06-27 PROCEDURE — 99214 OFFICE O/P EST MOD 30 MIN: CPT | Performed by: PHYSICIAN ASSISTANT

## 2022-06-27 PROCEDURE — 70450 CT HEAD/BRAIN W/O DYE: CPT

## 2022-06-27 NOTE — PROGRESS NOTES
Assessment & Plan     Closed head injury, initial encounter    You have a head injury.  Symptoms of a more serious problem may appear later. This could be a mild brain injury (concussion), or bruising or bleeding in the brain. You or someone caring for you will need to watch for the symptoms listed below for at least the next 24 hours. When you get home, be sure to follow any care instructions you re given.  Home care  Watch for the following symptoms:  Call 9-1-1 if you have any of these symptoms over the next hours or days:  1. Severe headache or headache that gets worse  2. Nausea and repeated throwing up (vomiting)  3. Dizziness or changes in eyesight (vision changes)  4. Bothered by bright light or loud noise  5. Sleep changes (trouble falling asleep or unusually sleepy or groggy)  6. Changes in the way you act or talk (personality or speech changes)  7. Feeling confused or forgetting things (memory loss)  8. Trouble walking or clumsiness  9. Passing out or fainting (even for a short time)  10. Won t wake up  11. Stiff neck  12. Weakness or numbness in any part of the body  13. Seizures  Do you have signs of a concussion?  A concussion is an injury to the brain caused by shaking. If you were knocked out, that s a sign you may have a concussion. But watch for these signs, too:    Upset stomach (nausea)    Throwing up (vomiting)    Feeling dizzy or confused    Headache    Loss of memory  If you have any of the above signs:    Don t return to sports or any activity where you might hurt your head again.    Wait until all symptoms have been gone for 2 full weeks, and your doctor has cleared you to return to sports.  You could get a serious brain injury if you get hurt again before fully recovering.  General care    You don t need to stay awake or be woken during the night.    For pain, you may use:  ? Tylenol (acetaminophen) 650 to 1000 mg every 6 hours OR  ? Motrin or Advil (ibuprofen) 600 mg every 6 to 8 hours  with food  NOTE: If you have chronic liver or kidney disease or ever had a stomach ulcer or GI bleeding, talk with your doctor before using these medicines.    Don t take aspirin after a head injury.    For swelling and to help with pain: Put a cold source to the injured area for up to 20 minutes at a time. Do this as often as directed. Use a cold pack or bag of ice wrapped in a thin towel. Never put a cold source directly on the skin.    For cuts and scrapes: Care for them as the doctor or nurse directed.    For the next 24 hours (or longer, if your doctor advises it):  ? Don t drink alcohol, use sedatives, or use other medicines that make you sleepy.  ? Don t drive or use large machines.  ? Don t do anything tiring, such as heavy lifting or straining.  ? Limit tasks where you need to focus or concentrate. This includes reading, using a smartphone or computer, watching TV and playing video games.  ? Don t return to sports or other activities that could cause another head injury.  Follow-up care  Follow up with your doctor if symptoms don t get better after 24 hours, or as directed.  When to call the doctor  Call your doctor right away if any of these occur:    Pain doesn t get better or gets worse    New or increased swelling or bruising    Fever of 100.4 F (38 C) or higher, or as directed by your doctor    Increased redness, warmth, draining or bleeding from the injury    Fluid drainage or bleeding from the nose or ears    Any pushed-in spot or bony bump in the injured area    - CT Head w/o Contrast; Future    Long term current use of anticoagulant therapy    Patient is on eliquis  CT scan rule out subdural hematoma or any bleed in the head    - CT Head w/o Contrast; Future    Injury of finger of left hand, initial encounter    RICE treatment: Rest, Ice, compression, elevation   Ice compresses    - XR Finger Left G/E 2 Views; Future    Finger fracture of right hand, initial encounter    RICE treatment: Rest, Ice,  compression, elevation   Armani tape fingers  Follow up with orthopedics  Referral placed  - XR Finger Right G/E 2 Views; Future    Review of external notes as documented elsewhere in note    At today's visit with Oliver Baires , we discussed results, diagnosis, medications and formulated a plan.  We also discussed red flags for immediate return to clinic/ER, as well as indications for follow up if no improvement. Patient understood and agreed to plan. Oliver Baires was discharged with stable vitals and has no further questions.       No follow-ups on file.    Solomon Bell [facility], CHRIS  [facility]    Results for orders placed or performed during the hospital encounter of 06/27/22   CT Head w/o Contrast     Status: None    Narrative    CT SCAN OF THE HEAD WITHOUT CONTRAST   6/27/2022 3:55 PM     HISTORY: Closed head injury, initial encounter. Long term current use  of anticoagulant therapy. Pain.    TECHNIQUE:  Axial images of the head and coronal reformations without  IV contrast material. Radiation dose for this scan was reduced using  automated exposure control, adjustment of the mA and/or kV according  to patient size, or iterative reconstruction technique.    COMPARISON: Head CT 3/10/2021.    FINDINGS: There is no evidence of intracranial hemorrhage, mass, acute  infarct or anomaly. The ventricles are normal in size, shape and  configuration. The brain parenchyma and subarachnoid spaces are  normal.     Mucosal thickening in the left maxillary sinus. The bony calvarium and  bones of the skull base appear intact.       Impression    IMPRESSION: No evidence of acute intracranial hemorrhage, mass, or  herniation.      KALPANA FELICIANO MD         SYSTEM ID:  [id number]   Results for orders placed or performed in visit on 06/27/22   XR Finger Left G/E 2 Views     Status: None    Narrative    FINGER LEFT TWO OR MORE VIEWS June 27, 2022 3:07 PM    INDICATION: Injury of finger of left hand, initial  encounter.    COMPARISON: None available.       Impression    IMPRESSION: No acute displaced small finger fracture or dislocation.  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 Mild small finger soft tissue swelling. No radiopaque  soft tissue foreign body.    SAYDA RIDLEY MD         SYSTEM ID:  [id number]   Results for orders placed or performed in visit on 06/27/22   XR Finger Right G/E 2 Views     Status: None    Narrative    XR RIGHT FINGER TWO OR MORE VIEWS  6/27/2022 3:07 PM    INDICATION: Injury of finger of right hand, initial encounter.  COMPARISON: None available.       Impression    IMPRESSION: Oblique lucency along the dorsal base of the small finger  distal phalanx could represent a nondisplaced fracture, seen on the  lateral view. Small ossific fragment along the palmar aspect of the  small finger proximal phalangeal head could represent a displaced  avulsive fracture from the palmar base of the middle phalanx small  finger. Punctate radiodensity in the radial and palmar small finger  soft tissues superficial could represent small foreign body at the  level of the DIP joint. Small finger soft tissue swelling. No  dislocation. No advanced joint space narrowing.       SAYDA RIDLEY MD         SYSTEM ID:  [id number]       Krysten Boyd is a 82 year old accompanied by his spouse., presenting for the following health issues:  Fall (Pt fell and hit head and has a bump on R side of temple. This happened 1 hour ago )      HPI     Oliver Baires, 82 year old, male presents to the urgent care today with:   Fall (Pt fell and hit head and has a bump on R side of temple. This happened 1 hour ago )      Review of Systems   Constitutional, HEENT, cardiovascular, pulmonary, GI, , musculoskeletal, neuro, skin, endocrine and psych systems are negative, except as otherwise noted.      Objective    /70   Pulse 60   Resp 16   Wt 87.5 kg (193 lb)   SpO2 100%   BMI  24.78 kg/m    Body mass index is 24.78 kg/m .  Physical Exam   GENERAL: healthy, alert and no distress  EYES: Eyes grossly normal to inspection, PERRL and conjunctivae and sclerae normal  HENT: ear canals and TM's normal, nose and mouth without ulcers or lesions  NECK: no adenopathy, no asymmetry, masses, or scars and thyroid normal to palpation  RESP: lungs clear to auscultation - no rales, rhonchi or wheezes  CV: regular rate and rhythm, normal S1 S2, no S3 or S4, no murmur, click or rub, no peripheral edema and peripheral pulses strong  ABDOMEN: soft, nontender, no hepatosplenomegaly, no masses and bowel sounds normal  MS: Positive for tenderness right pinky and left pinky  SKIN: Positive for hematoma right forehead  NEURO: Normal strength and tone, mentation intact and speech normal  PSYCH: mentation appears normal, affect normal/bright                    .  ..

## 2022-07-03 DIAGNOSIS — I10 ESSENTIAL HYPERTENSION: ICD-10-CM

## 2022-07-05 RX ORDER — LISINOPRIL 5 MG/1
TABLET ORAL
Qty: 90 TABLET | Refills: 1 | Status: SHIPPED | OUTPATIENT
Start: 2022-07-05 | End: 2023-01-04

## 2022-08-06 ENCOUNTER — HEALTH MAINTENANCE LETTER (OUTPATIENT)
Age: 82
End: 2022-08-06

## 2022-08-18 DIAGNOSIS — E78.5 HYPERLIPIDEMIA LDL GOAL <100: ICD-10-CM

## 2022-08-22 NOTE — TELEPHONE ENCOUNTER
Routing refill request to provider for review/approval because:  LDL is out of date   LDL Cholesterol Calculated   Date Value Ref Range Status   06/08/2021 83 <100 mg/dL Final     Comment:     Desirable:       <100 mg/dl        Per last refill- pt is due for a visit. No recent visit on file with PCP.   Routing to provider to review and advise.

## 2022-08-23 RX ORDER — ATORVASTATIN CALCIUM 80 MG/1
TABLET, FILM COATED ORAL
Qty: 90 TABLET | Refills: 0 | Status: SHIPPED | OUTPATIENT
Start: 2022-08-23 | End: 2022-11-21

## 2022-09-06 DIAGNOSIS — I10 ESSENTIAL HYPERTENSION: ICD-10-CM

## 2022-09-06 DIAGNOSIS — I77.810 AORTIC ROOT DILATION (H): ICD-10-CM

## 2022-09-06 RX ORDER — METOPROLOL SUCCINATE 25 MG/1
12.5 TABLET, EXTENDED RELEASE ORAL DAILY
Qty: 45 TABLET | Refills: 3 | Status: SHIPPED | OUTPATIENT
Start: 2022-09-06 | End: 2023-10-27

## 2022-09-06 NOTE — TELEPHONE ENCOUNTER
Received refill request for:  Metoprolol  Last OV was: 6/2022 Dr. Villarreal   Labs/EKG: N/a  F/U scheduled: Orders for 6/2023  Pharmacy: [facility] Refill Guideline reviewed.  Medication meets criteria for refill.    Lisa Huerta RN, BSN  09/06/22 at 2:44 PM

## 2022-10-04 ENCOUNTER — DOCUMENTATION ONLY (OUTPATIENT)
Dept: LAB | Facility: CLINIC | Age: 82
End: 2022-10-04

## 2022-10-04 DIAGNOSIS — E78.5 HYPERLIPIDEMIA WITH TARGET LDL LESS THAN 100: Primary | ICD-10-CM

## 2022-10-04 NOTE — PROGRESS NOTES
Please place or confirm orders for upcoming lab appointment on 10/10/2022.    If no labs are needed at this time please have the Care Team contact patient regarding this information and cancel lab appointment. Thank you.     Radhika DOZIER

## 2022-10-05 ENCOUNTER — IMMUNIZATION (OUTPATIENT)
Dept: NURSING | Facility: CLINIC | Age: 82
End: 2022-10-05
Payer: COMMERCIAL

## 2022-10-05 PROCEDURE — 91312 COVID-19,PF,PFIZER BOOSTER BIVALENT: CPT

## 2022-10-05 PROCEDURE — G0008 ADMIN INFLUENZA VIRUS VAC: HCPCS | Mod: 59

## 2022-10-05 PROCEDURE — 90662 IIV NO PRSV INCREASED AG IM: CPT

## 2022-10-05 PROCEDURE — 0124A COVID-19,PF,PFIZER BOOSTER BIVALENT: CPT

## 2022-10-06 DIAGNOSIS — I26.99 RECURRENT PULMONARY EMBOLISM (H): ICD-10-CM

## 2022-10-10 ENCOUNTER — LAB (OUTPATIENT)
Dept: LAB | Facility: CLINIC | Age: 82
End: 2022-10-10
Payer: COMMERCIAL

## 2022-10-10 DIAGNOSIS — E78.5 HYPERLIPIDEMIA WITH TARGET LDL LESS THAN 100: ICD-10-CM

## 2022-10-10 LAB
ALBUMIN SERPL-MCNC: 3.6 G/DL (ref 3.4–5)
ALP SERPL-CCNC: 66 U/L (ref 40–150)
ALT SERPL W P-5'-P-CCNC: 37 U/L (ref 0–70)
ANION GAP SERPL CALCULATED.3IONS-SCNC: 4 MMOL/L (ref 3–14)
AST SERPL W P-5'-P-CCNC: 37 U/L (ref 0–45)
BILIRUB SERPL-MCNC: 1 MG/DL (ref 0.2–1.3)
BUN SERPL-MCNC: 15 MG/DL (ref 7–30)
CALCIUM SERPL-MCNC: 9.2 MG/DL (ref 8.5–10.1)
CHLORIDE BLD-SCNC: 107 MMOL/L (ref 94–109)
CHOLEST SERPL-MCNC: 155 MG/DL
CO2 SERPL-SCNC: 29 MMOL/L (ref 20–32)
CREAT SERPL-MCNC: 0.9 MG/DL (ref 0.66–1.25)
FASTING STATUS PATIENT QL REPORTED: YES
GFR SERPL CREATININE-BSD FRML MDRD: 85 ML/MIN/1.73M2
GLUCOSE BLD-MCNC: 102 MG/DL (ref 70–99)
HDLC SERPL-MCNC: 66 MG/DL
LDLC SERPL CALC-MCNC: 76 MG/DL
NONHDLC SERPL-MCNC: 89 MG/DL
POTASSIUM BLD-SCNC: 4 MMOL/L (ref 3.4–5.3)
PROT SERPL-MCNC: 7.3 G/DL (ref 6.8–8.8)
SODIUM SERPL-SCNC: 140 MMOL/L (ref 133–144)
TRIGL SERPL-MCNC: 63 MG/DL

## 2022-10-10 PROCEDURE — 80053 COMPREHEN METABOLIC PANEL: CPT

## 2022-10-10 PROCEDURE — 36415 COLL VENOUS BLD VENIPUNCTURE: CPT

## 2022-10-10 PROCEDURE — 80061 LIPID PANEL: CPT

## 2022-10-10 RX ORDER — RIVAROXABAN 20 MG/1
TABLET, FILM COATED ORAL
Qty: 90 TABLET | Refills: 3 | Status: SHIPPED | OUTPATIENT
Start: 2022-10-10 | End: 2023-10-18

## 2022-10-10 NOTE — TELEPHONE ENCOUNTER
Routing refill request to provider for review/approval because:    Leticia SUERO RN  [facility]

## 2022-10-10 NOTE — TELEPHONE ENCOUNTER
[facility] does not fill DOACs    Rhonda Betancourt RN   [facility]

## 2022-11-17 DIAGNOSIS — E78.5 HYPERLIPIDEMIA LDL GOAL <100: ICD-10-CM

## 2022-11-18 NOTE — TELEPHONE ENCOUNTER
Routing refill request to provider for review/approval because:  Patient needs to be seen because it has been more than 1 year since last office visit. Last OV 6/2021.    Darcy Rahman RN

## 2022-11-21 RX ORDER — ATORVASTATIN CALCIUM 80 MG/1
TABLET, FILM COATED ORAL
Qty: 90 TABLET | Refills: 0 | Status: SHIPPED | OUTPATIENT
Start: 2022-11-21 | End: 2023-02-27

## 2022-12-19 ENCOUNTER — TELEPHONE (OUTPATIENT)
Dept: INTERNAL MEDICINE | Facility: CLINIC | Age: 82
End: 2022-12-19

## 2022-12-19 NOTE — TELEPHONE ENCOUNTER
FYI - Status Update    Who is Calling: patient    Update: Pt has hearing aid appt scheduled in jan, was adivse by the tech that he should have his ears cleaned before appt. PT is wanting to know if he should schedule with pcp or is it okay to schedule nurse only appt?    Does caller want a call/response back: Yes     Could we send this information to you in [facility] or would you prefer to receive a phone call?:   Patient would prefer a phone call   Okay to leave a detailed message?: Yes at Home number on file [phone] (home)

## 2022-12-20 NOTE — TELEPHONE ENCOUNTER
Called patient and inform him that we don't have any opening and gave him central scheduling number.

## 2023-01-09 ENCOUNTER — HOSPITAL ENCOUNTER (EMERGENCY)
Facility: CLINIC | Age: 83
Discharge: HOME | End: 2023-01-09
Attending: EMERGENCY MEDICINE | Admitting: EMERGENCY MEDICINE
Payer: COMMERCIAL

## 2023-01-09 ENCOUNTER — APPOINTMENT (OUTPATIENT)
Dept: CT IMAGING | Facility: CLINIC | Age: 83
End: 2023-01-09
Attending: EMERGENCY MEDICINE
Payer: COMMERCIAL

## 2023-01-09 VITALS
TEMPERATURE: 97.7 F | HEART RATE: 53 BPM | SYSTOLIC BLOOD PRESSURE: 142 MMHG | OXYGEN SATURATION: 97 % | WEIGHT: 194 LBS | DIASTOLIC BLOOD PRESSURE: 81 MMHG | RESPIRATION RATE: 16 BRPM | BODY MASS INDEX: 24.91 KG/M2

## 2023-01-09 DIAGNOSIS — R42 DIZZINESS: Primary | ICD-10-CM

## 2023-01-09 DIAGNOSIS — F43.9 STRESS: ICD-10-CM

## 2023-01-09 DIAGNOSIS — F10.10 ALCOHOL ABUSE: ICD-10-CM

## 2023-01-09 LAB
ALBUMIN SERPL-MCNC: 3.6 G/DL (ref 3.4–5)
ALP SERPL-CCNC: 67 U/L (ref 40–150)
ALT SERPL W P-5'-P-CCNC: 41 U/L (ref 0–70)
ANION GAP SERPL CALCULATED.3IONS-SCNC: 7 MMOL/L (ref 3–14)
AST SERPL W P-5'-P-CCNC: 40 U/L (ref 0–45)
ATRIAL RATE - MUSE: NORMAL BPM
BASOPHILS # BLD AUTO: 0 10E3/UL (ref 0–0.2)
BASOPHILS NFR BLD AUTO: 0 %
BILIRUB SERPL-MCNC: 1.1 MG/DL (ref 0.2–1.3)
BUN SERPL-MCNC: 18 MG/DL (ref 7–30)
CALCIUM SERPL-MCNC: 9 MG/DL (ref 8.5–10.1)
CHLORIDE BLD-SCNC: 105 MMOL/L (ref 94–109)
CO2 SERPL-SCNC: 27 MMOL/L (ref 20–32)
CREAT SERPL-MCNC: 0.72 MG/DL (ref 0.66–1.25)
DIASTOLIC BLOOD PRESSURE - MUSE: NORMAL MMHG
EOSINOPHIL # BLD AUTO: 0.2 10E3/UL (ref 0–0.7)
EOSINOPHIL NFR BLD AUTO: 4 %
ERYTHROCYTE [DISTWIDTH] IN BLOOD BY AUTOMATED COUNT: 13.2 % (ref 10–15)
GFR SERPL CREATININE-BSD FRML MDRD: >90 ML/MIN/1.73M2
GLUCOSE BLD-MCNC: 110 MG/DL (ref 70–99)
HCT VFR BLD AUTO: 41.2 % (ref 40–53)
HGB BLD-MCNC: 14.2 G/DL (ref 13.3–17.7)
HOLD SPECIMEN: NORMAL
IMM GRANULOCYTES # BLD: 0 10E3/UL
IMM GRANULOCYTES NFR BLD: 0 %
INTERPRETATION ECG - MUSE: NORMAL
LYMPHOCYTES # BLD AUTO: 1.6 10E3/UL (ref 0.8–5.3)
LYMPHOCYTES NFR BLD AUTO: 25 %
MCH RBC QN AUTO: 33.3 PG (ref 26.5–33)
MCHC RBC AUTO-ENTMCNC: 34.5 G/DL (ref 31.5–36.5)
MCV RBC AUTO: 97 FL (ref 78–100)
MONOCYTES # BLD AUTO: 0.9 10E3/UL (ref 0–1.3)
MONOCYTES NFR BLD AUTO: 14 %
NEUTROPHILS # BLD AUTO: 3.7 10E3/UL (ref 1.6–8.3)
NEUTROPHILS NFR BLD AUTO: 57 %
NRBC # BLD AUTO: 0 10E3/UL
NRBC BLD AUTO-RTO: 0 /100
P AXIS - MUSE: NORMAL DEGREES
PLATELET # BLD AUTO: 192 10E3/UL (ref 150–450)
POTASSIUM BLD-SCNC: 3.6 MMOL/L (ref 3.4–5.3)
PR INTERVAL - MUSE: NORMAL MS
PROT SERPL-MCNC: 7.8 G/DL (ref 6.8–8.8)
QRS DURATION - MUSE: 94 MS
QT - MUSE: 414 MS
QTC - MUSE: 427 MS
R AXIS - MUSE: -37 DEGREES
RBC # BLD AUTO: 4.26 10E6/UL (ref 4.4–5.9)
SODIUM SERPL-SCNC: 139 MMOL/L (ref 133–144)
SYSTOLIC BLOOD PRESSURE - MUSE: NORMAL MMHG
T AXIS - MUSE: 7 DEGREES
TROPONIN I SERPL HS-MCNC: 14 NG/L
VENTRICULAR RATE- MUSE: 64 BPM
WBC # BLD AUTO: 6.5 10E3/UL (ref 4–11)

## 2023-01-09 PROCEDURE — 250N000013 HC RX MED GY IP 250 OP 250 PS 637: Performed by: EMERGENCY MEDICINE

## 2023-01-09 PROCEDURE — 36415 COLL VENOUS BLD VENIPUNCTURE: CPT | Performed by: EMERGENCY MEDICINE

## 2023-01-09 PROCEDURE — 70450 CT HEAD/BRAIN W/O DYE: CPT

## 2023-01-09 PROCEDURE — 84484 ASSAY OF TROPONIN QUANT: CPT | Performed by: EMERGENCY MEDICINE

## 2023-01-09 PROCEDURE — 85004 AUTOMATED DIFF WBC COUNT: CPT | Performed by: EMERGENCY MEDICINE

## 2023-01-09 PROCEDURE — 99285 EMERGENCY DEPT VISIT HI MDM: CPT | Mod: 25

## 2023-01-09 PROCEDURE — 80053 COMPREHEN METABOLIC PANEL: CPT | Performed by: EMERGENCY MEDICINE

## 2023-01-09 PROCEDURE — 93005 ELECTROCARDIOGRAM TRACING: CPT

## 2023-01-09 RX ORDER — MECLIZINE HYDROCHLORIDE 25 MG/1
25 TABLET ORAL ONCE
Status: COMPLETED | OUTPATIENT
Start: 2023-01-09 | End: 2023-01-09

## 2023-01-09 RX ORDER — MECLIZINE HYDROCHLORIDE 25 MG/1
25 TABLET ORAL 3 TIMES DAILY PRN
Qty: 30 TABLET | Refills: 0 | Status: SHIPPED | OUTPATIENT
Start: 2023-01-09 | End: 2023-09-14

## 2023-01-09 RX ADMIN — MECLIZINE HYDROCHLORIDE 25 MG: 25 TABLET ORAL at 10:15

## 2023-01-09 ASSESSMENT — ENCOUNTER SYMPTOMS
BACK PAIN: 0
DIZZINESS: 1
FEVER: 0
HEADACHES: 0
WEAKNESS: 0
NUMBNESS: 0
ABDOMINAL PAIN: 0

## 2023-01-09 ASSESSMENT — ACTIVITIES OF DAILY LIVING (ADL)
ADLS_ACUITY_SCORE: 35
ADLS_ACUITY_SCORE: 35

## 2023-01-09 NOTE — ED NOTES
Bed: ED08  Expected date:   Expected time:   Means of arrival:   Comments:  Cresencio Cano 82M dizziness-A-fib

## 2023-01-09 NOTE — ED PROVIDER NOTES
History     Chief Complaint:  Dizziness       HPI   Oliver Baires is a 82 year old male on xarelto with history of coronary artery disease, DVT, PE, permanent atrial fibrillation, MI, and hypertension who presents with sudden onset dizziness. His dizziness developed at 0800 today, 2 minutes after standing to get coffee from his kitchen. He felt fine when he woke up but became very unsteady with his dizziness which was worse than past episodes of dizziness. Patient feels that his dizziness is slightly worse with head rotation but not change when he closes his eyes and improved compared to onset. He denies fever, chest pain, back pain, and abdominal pain. He also denies headaches, weakness, numbness, changes in sensation, and missing doses of his anticoagulant. Patient recently exerted himself while shoveling and notes that he cares for his wife at home. He notes starting a course of amoxicillin 2 days ago for a right ear infection and having a very stressful day yesterday. Patient last drank alcohol last night and drinks 3 times a day most often during the evening; patient is concerned that he is drinking too much daily due to increased stress.    Independent Historian: patient     Review of External Notes: cardiologist note     ROS:  Review of Systems   Constitutional: Negative for fever.   Cardiovascular: Negative for chest pain.   Gastrointestinal: Negative for abdominal pain.   Musculoskeletal: Negative for back pain.   Neurological: Positive for dizziness. Negative for weakness, numbness and headaches.   All other systems reviewed and are negative.      Allergies:  Metoprolol     Medications:    xarelto  nitroglycerin  Zestril  Lipitor     Past Medical History:    PE  Aortic root dilatation   Arthritis   AV block   Bradycardia   CAD (coronary artery disease)   DVT (deep venous thrombosis) (H)   ED (erectile dysfunction)   GERD (gastroesophageal reflux disease)   Hyperlipidemia   Hypertension   IBS (irritable  bowel syndrome)   Impaired fasting glucose   Liver cyst   MI (myocardial infarction) (H)   Mild ascending aorta dilatation (H)   Permanent atrial fibrillation (H)   Personal history of colonic polyps   Pneumonia   Pulmonary emboli (H)   Right ventricular dilation   Squamous cell carcinoma     Past Surgical History:    Cholecystectomy   Angioplasty  Heart catheterization  Tubular adenoma excised     Family History:    Mother: cardiovascular disease   Father brother: cancer    Social History:  Patient presents alone  PCP: Renan Henry     Physical Exam     Patient Vitals for the past 24 hrs:   BP Temp Temp src Pulse Resp SpO2 Weight   01/09/23 1143 (!) 142/81 -- -- 53 16 97 % --   01/09/23 1130 (!) 146/134 -- -- 56 28 95 % --   01/09/23 1119 (!) 154/91 -- -- (!) 48 28 97 % --   01/09/23 1100 (!) 145/84 -- -- 58 25 95 % --   01/09/23 1030 134/86 -- -- 62 10 96 % --   01/09/23 1015 119/76 -- -- 63 -- 97 % --   01/09/23 0903 (!) 164/81 97.7  F (36.5  C) Oral 64 10 99 % 88 kg (194 lb 0.1 oz)        Physical Exam  General: Alert and cooperative with exam. Patient in mild distress. Normal mentation.  Head:  Scalp is NC/AT  Eyes:  No scleral icterus, PERRL, EOMI   ENT:  The external nose and ears are normal. The oropharynx is normal and without erythema; mucus membranes are moist. Uvula midline, no evidence of deep space infection.  Neck:  Normal range of motion without rigidity.  CV:  irregularly irregular rhythm.    No pathologic murmur   Resp:  Breath sounds are clear bilaterally    Non-labored, no retractions or accessory muscle use  GI:  Abdomen is soft, no distension, no tenderness. No peritoneal signs  MS:  No lower extremity edema   Skin:  Warm and dry, No rash or lesions noted.  Neuro: Oriented x 3. No gross motor deficits.    Strength and sensation grossly intact in all 4 extremities.      Cranial nerves 2-12 intact.    GCS: 15     Normal finger to nose and heel to shin testing    Nystagmus present  (horizontal, left beating; fatigable), positive head impulse test.    Emergency Department Course   ECG  ECG taken at 0858, ECG read at 0901  Atrial fibrillation   Left axis deviation   Cannot rule out anterior infarct, age undetermined  Isolated T wave inversion lead 3  Abnormal ECG   No significant change as compared to prior, dated 3/10/21.  Rate 64 bpm. TN interval * ms. QRS duration 94 ms. QT/QTc 414/427 ms. P-R-T axes * -37 7.    Imaging:  Head CT w/o contrast   Preliminary Result   IMPRESSION:   No evidence of acute intracranial hemorrhage, mass, or   herniation.         Report per radiology    Laboratory:  Labs Ordered and Resulted from Time of ED Arrival to Time of ED Departure   COMPREHENSIVE METABOLIC PANEL - Abnormal       Result Value    Sodium 139      Potassium 3.6      Chloride 105      Carbon Dioxide (CO2) 27      Anion Gap 7      Urea Nitrogen 18      Creatinine 0.72      Calcium 9.0      Glucose 110 (*)     Alkaline Phosphatase 67      AST 40      ALT 41      Protein Total 7.8      Albumin 3.6      Bilirubin Total 1.1      GFR Estimate >90     CBC WITH PLATELETS AND DIFFERENTIAL - Abnormal    WBC Count 6.5      RBC Count 4.26 (*)     Hemoglobin 14.2      Hematocrit 41.2      MCV 97      MCH 33.3 (*)     MCHC 34.5      RDW 13.2      Platelet Count 192      % Neutrophils 57      % Lymphocytes 25      % Monocytes 14      % Eosinophils 4      % Basophils 0      % Immature Granulocytes 0      NRBCs per 100 WBC 0      Absolute Neutrophils 3.7      Absolute Lymphocytes 1.6      Absolute Monocytes 0.9      Absolute Eosinophils 0.2      Absolute Basophils 0.0      Absolute Immature Granulocytes 0.0      Absolute NRBCs 0.0     TROPONIN I - Normal    Troponin I High Sensitivity 14            Emergency Department Course & Assessments:  Interventions:  1015 Antivert, 25 mg, oral     Consultations/Discussion of Management or Tests:  1044 I spoke with a family member regarding patient's presentation, findings,  and plan of care.      Disposition:  The patient was discharged to home.     Impression & Plan    Medical Decision Making:    Oliver Baires is a 82 year old male who presents for evaluation of vertigo/dizziness. The differential diagnosis of vertigo is broad and includes common etiologies such as menieres disease, labyrinthitis, benign positional vertigo, otitis media, etc.  More serious etiologies considered include central etiologies such as tumor, intracerebral bleed, dissection, ischemic cerebral vascular accident.  The history, physical exam including detailed neurologic exam, and workup in the emergency room suggests a benign cause of vertigo/dizziness today.  Patient is compliant with Xarelto and given current anticoagulation use, head CT was obtained and fortunately unremarkable.  There is low clinical suspicion for CVA.  Patient feels improved after interventions noted above. Further outpatient management is indicated with vertigo medications.  Patient also reported concerns for increased alcohol usage due to stress.  Placed outpatient referral with patient consent for outpatient mental health/chemical dependency counseling.    Diagnosis:    ICD-10-CM    1. Dizziness  R42 Physical Therapy Referral      2. Stress  F43.9 Adult Mental Health  Referral      3. Alcohol abuse  F10.10 Adult Mental Health  Referral           Discharge Medications:  New Prescriptions    MECLIZINE (ANTIVERT) 25 MG TABLET    Take 1 tablet (25 mg) by mouth 3 times daily as needed for dizziness        Scribe Disclosure:  I, Rohan Jeronimo, am serving as a scribe at 9:03 AM on 1/9/2023 to document services personally performed by Milton Fowler DO based on my observations and the provider's statements to me.     1/9/2023   DO Marleni Shetty Christopher Warren, DO  01/09/23 2457

## 2023-02-01 ENCOUNTER — OFFICE VISIT (OUTPATIENT)
Dept: DERMATOLOGY | Facility: CLINIC | Age: 83
End: 2023-02-01
Payer: COMMERCIAL

## 2023-02-01 DIAGNOSIS — L57.8 ACTINIC SKIN DAMAGE: ICD-10-CM

## 2023-02-01 DIAGNOSIS — L81.4 LENTIGINES: ICD-10-CM

## 2023-02-01 DIAGNOSIS — L57.0 ACTINIC KERATOSIS: Primary | ICD-10-CM

## 2023-02-01 DIAGNOSIS — D48.5 NEOPLASM OF UNCERTAIN BEHAVIOR OF SKIN: ICD-10-CM

## 2023-02-01 PROCEDURE — 99213 OFFICE O/P EST LOW 20 MIN: CPT | Mod: 25 | Performed by: STUDENT IN AN ORGANIZED HEALTH CARE EDUCATION/TRAINING PROGRAM

## 2023-02-01 PROCEDURE — 11102 TANGNTL BX SKIN SINGLE LES: CPT | Mod: XS | Performed by: STUDENT IN AN ORGANIZED HEALTH CARE EDUCATION/TRAINING PROGRAM

## 2023-02-01 PROCEDURE — 88305 TISSUE EXAM BY PATHOLOGIST: CPT | Performed by: DERMATOLOGY

## 2023-02-01 PROCEDURE — 17004 DESTROY PREMAL LESIONS 15/>: CPT | Performed by: STUDENT IN AN ORGANIZED HEALTH CARE EDUCATION/TRAINING PROGRAM

## 2023-02-01 NOTE — PROGRESS NOTES
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    Diagnoses and all orders for this visit:    Actinic keratosis  -     DESTRUCT PREMALIGNANT LESION, 15 OR MORE  -21 AK's on face and scalp  - See procedure note    Neoplasm of uncertain behavior of skin  -     SHAVE 1 [[id number]]  -     Dermatological Path Order and Indications; Standing  -     Dermatological Path Order and Indications  -Left scalp DDx pigmented basal cell shave biopsy see procedure note.  Of note when discussing treatment patient prefers that this area, if it does return is a skin cancer, for it to be treated with imiquimod rather than Mohs surgery.  Discussed that Mohs surgery is the gold standard with a cure rate above 95% versus the cure rate for imiquimod with nodular basal cells is around 80% at 5 years  - Additionally neoplasm of uncertain behavior right superior helix patient wishes to defer biopsy for now will recheck in 6 months        Lentigines  Actinic skin damage  - discussed sunprotective behaviors, clothing, and the use of sunscreen        Cryotherapy procedure note: After verbal consent and discussion of risks and benefits including but no limited to dyspigmentation/scar, blister, and pain, 21 aks on face and scalp was(were) treated with 1-2mm freeze border for 2 cycles with liquid nitrogen. Post cryotherapy instructions were provided.    and - SHAVE BIOPSY PROCEDURE NOTE: After written informed consent was obtained, a time out was taken to identify the patient and the correct site for biopsy.     The lesion on the L scalp was cleansed with a 70% isopropyl alcohol wipe, and then injected with 2cc of lidocaine 1% with epinephrine 1:100,000. Once anesthesia was ensured, the visible surface of the lesion  was biopsied using a McCormick blade in standard technique. Hemostasis was obtained with pressure and aluminum chloride 20% solution. The specimen was placed in a labeled formalin container and sent to pathology for sectioning and analysis. The wound was dressed with white petrolatum and an adhesive bandage. The patient tolerated the procedure well. Post-procedure instructions and recommendations were provided both verbally and in writing.    Follow-up in 6 mo.       Staff Involved:  Staff Only    Rodrick Chang MD   of Dermatology  Department of Dermatology  [facility] [employer]      CC:   Chief Complaint   Patient presents with     Derm Problem     [facility]        History of Present Illness:  Mr. Oliver Baires is a 82 year old male who presents as a return patient.    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    Did notice a scaly spot on his left arm but this recently flaked off on its own and there is no residual lesion present on exam today    Labs:      Physical exam:  Vitals: There were no vitals taken for this visit.  GEN: well developed, well-nourished, in no acute distress, in a pleasant mood.     SKIN: Anand phototype 1  - Full skin, which includes the head/face, both arms, chest, back, abdomen,both legs, genitalia and/or groin buttocks, digits and/or nails, was examined.  - Flat brown macules and patches in a sun exposed areas on face and extremities  - scattered brown papules on trunk and extremities   -Pigmented pearly papule left scalp  - Scaly gritty papules on face and scalp  - Raised pink to tan papule right helix with slight overlying scale  - No other lesions of concern on areas examined.     Past Medical History:   Past Medical History:   Diagnosis Date     Aortic root dilatation (H)  12/31/2019    chest CTA     Arthritis     Left knee-L knee injury     AV block      Bradycardia     not on bb due to low HR     CAD (coronary artery disease)     Cath 6/2010- angioplasty & Xience 3.0x15mm stent to prox LAD; STEMI 10/2004- 3.5 x 33mm Cypher ALEJANDRA to distal RCA, angioplasty to VINCENT, Lcx25%, Lma 25%     DVT (deep venous thrombosis) (H)     on long term noac due to dvtx2 and now afib     ED (erectile dysfunction)      GERD (gastroesophageal reflux disease)      Hyperlipidemia      Hypertension      IBS (irritable bowel syndrome)      Impaired fasting glucose      Liver cyst 2017    right lower lobe     MI (myocardial infarction) (H)     STEMI 10/2004     Mild ascending aorta dilatation (H) 12/31/2019    chest CTA     Permanent atrial fibrillation (H)      Personal history of colonic polyps     Tubular adenomas excised 1999, 2002     Pneumonia 2004    pleural effusion     Pulmonary emboli (H) 2017    recurrent PE when off anticoag-now lifelong anticoag (unprovoked after car ride)- right leg dvt     Right ventricular dilation     suspect some sleep apnea, pt declines any sleep eval and states would not wear cpap anyway     Skin cancer      Squamous cell carcinoma      Past Surgical History:   Procedure Laterality Date     CHOLECYSTECTOMY       HC REMOVAL GALLBLADDER       HEART CATH, ANGIOPLASTY  6/23/2010    Angioplasty & Xience 3.0x15mm stent to prox LAD     HEART CATH, ANGIOPLASTY  10/30/2004    3.5x33mm Cypher stent to distal RCA; angioplasty to VINCENT     SURGICAL HISTORY OF -   11/04    [facility]gioplasty[facility]  [facility]    Single tubular adenoma [facility]     [facility]  [facility]    Tubular adenoma, hepatic flexure.       Social History:   reports that he quit smoking about 42 years ago. His smoking use included cigarettes. He has never used smokeless tobacco. He reports current alcohol use. He reports that he does not use drugs.    Family  History:  Family History   Problem Relation Age of Onset     Cancer Father         Pancreatic CA,  age 82     Cardiovascular Mother          of ruptured AAA age 92     Cancer Brother         liver ca       Medications:  Current Outpatient Medications   Medication Sig Dispense Refill     atorvastatin (LIPITOR) 80 MG tablet TAKE 1 TABLET BY MOUTH EVERY DAY 90 tablet 0     Cholecalciferol (VITAMIN D PO) Take 1,000 Units by mouth daily        lisinopril (ZESTRIL) 5 MG tablet TAKE 1 TABLET BY MOUTH EVERY DAY 90 tablet 0     meclizine (ANTIVERT) 25 MG tablet Take 1 tablet (25 mg) by mouth 3 times daily as needed for dizziness 30 tablet 0     metoprolol succinate ER (TOPROL XL) 25 MG 24 hr tablet Take 0.5 tablets (12.5 mg) by mouth daily 45 tablet 3     nitroGLYcerin (NITROSTAT) 0.4 MG sublingual tablet Place 1 tablet (0.4 mg) under the tongue every 5 minutes as needed for chest pain up to 3 tablets per episode. 60 tablet 1     UNABLE TO FIND Take by mouth 2 times daily MEDICATION NAME: Preservation vitamin pills.       XARELTO ANTICOAGULANT 20 MG TABS tablet TAKE 1 TABLET BY MOUTH DAILY WITH DINNER 90 tablet 3     Allergies   Allergen Reactions     Metoprolol      Symptomatic bradycardia

## 2023-02-01 NOTE — LETTER
2/1/2023         RE: Oliver Baires  [address]  [facility] [address]        Dear Colleague,    Thank you for referring your patient, Oliver Baires, to the [facility]. Please see a copy of my visit note below.    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    Diagnoses and all orders for this visit:    Actinic keratosis  -     DESTRUCT PREMALIGNANT LESION, 15 OR MORE  -21 AK's on face and scalp  - See procedure note    Neoplasm of uncertain behavior of skin  -     SHAVE 1 [[id number]]  -     Dermatological Path Order and Indications; Standing  -     Dermatological Path Order and Indications  -Left scalp DDx pigmented basal cell shave biopsy see procedure note.  Of note when discussing treatment patient prefers that this area, if it does return is a skin cancer, for it to be treated with imiquimod rather than Mohs surgery.  Discussed that Mohs surgery is the gold standard with a cure rate above 95% versus the cure rate for imiquimod with nodular basal cells is around 80% at 5 years  - Additionally neoplasm of uncertain behavior right superior helix patient wishes to defer biopsy for now will recheck in 6 months        Lentigines  Actinic skin damage  - discussed sunprotective behaviors, clothing, and the use of sunscreen        Cryotherapy procedure note: After verbal consent and discussion of risks and benefits including but no limited to dyspigmentation/scar, blister, and pain, 21 aks on face and scalp was(were) treated with 1-2mm freeze border for 2 cycles with liquid nitrogen. Post cryotherapy instructions were provided.    and - SHAVE BIOPSY PROCEDURE NOTE: After written informed consent was obtained, a time out was taken to identify  the patient and the correct site for biopsy.     The lesion on the L scalp was cleansed with a 70% isopropyl alcohol wipe, and then injected with 2cc of lidocaine 1% with epinephrine 1:100,000. Once anesthesia was ensured, the visible surface of the lesion was biopsied using a Venkat blade in standard technique. Hemostasis was obtained with pressure and aluminum chloride 20% solution. The specimen was placed in a labeled formalin container and sent to pathology for sectioning and analysis. The wound was dressed with white petrolatum and an adhesive bandage. The patient tolerated the procedure well. Post-procedure instructions and recommendations were provided both verbally and in writing.    Follow-up in 6 mo.       Staff Involved:  Staff Only    Rodrick Chang MD   of Dermatology  Department of Dermatology  [facility]      CC:   Chief Complaint   Patient presents with     Derm Problem     Fbscs        History of Present Illness:  Mr. Olievr Baires is a 82 year old male who presents as a return patient.    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    Did notice a scaly spot on his left arm but this recently flaked off on its own and there is no residual lesion present on exam today    Labs:      Physical exam:  Vitals: There were no vitals taken for this visit.  GEN: well developed, well-nourished, in no acute distress, in a pleasant mood.     SKIN: Anand phototype 1  - Full skin, which includes the head/face, both arms, chest, back, abdomen,both legs, genitalia and/or groin buttocks, digits and/or nails, was examined.  - Flat brown macules and patches in a sun exposed areas on face and extremities  - scattered brown papules on trunk and extremities   -Pigmented pearly papule left scalp  -  Scaly gritty papules on face and scalp  - Raised pink to tan papule right helix with slight overlying scale  - No other lesions of concern on areas examined.     Past Medical History:   Past Medical History:   Diagnosis Date     Aortic root dilatation (H) 12/31/2019    chest CTA     Arthritis     Left knee-L knee injury     AV block      Bradycardia     not on bb due to low HR     CAD (coronary artery disease)     Cath 6/2010- angioplasty & Xience 3.0x15mm stent to prox LAD; STEMI 10/2004- 3.5 x 33mm Cypher ALEJANDRA to distal RCA, angioplasty to VINCENT, Lcx25%, Lma 25%     DVT (deep venous thrombosis) (H)     on long term noac due to dvtx2 and now afib     ED (erectile dysfunction)      GERD (gastroesophageal reflux disease)      Hyperlipidemia      Hypertension      IBS (irritable bowel syndrome)      Impaired fasting glucose      Liver cyst 2017    right lower lobe     MI (myocardial infarction) (H)     STEMI 10/2004     Mild ascending aorta dilatation (H) 12/31/2019    chest CTA     Permanent atrial fibrillation (H)      Personal history of colonic polyps     Tubular adenomas excised 1999, 2002     Pneumonia 2004    pleural effusion     Pulmonary emboli (H) 2017    recurrent PE when off anticoag-now lifelong anticoag (unprovoked after car ride)- right leg dvt     Right ventricular dilation     suspect some sleep apnea, pt declines any sleep eval and states would not wear cpap anyway     Skin cancer      Squamous cell carcinoma      Past Surgical History:   Procedure Laterality Date     CHOLECYSTECTOMY       HC REMOVAL GALLBLADDER       HEART CATH, ANGIOPLASTY  6/23/2010    Angioplasty & Xience 3.0x15mm stent to prox LAD     HEART CATH, ANGIOPLASTY  10/30/2004    3.5x33mm Cypher stent to distal RCA; angioplasty to VINCENT     SURGICAL HISTORY OF -   11/04    Angioplasty/stenting of RCA     [facility] COLONOSCOPY THRU STOMA, DIAGNOSTIC  1999    Single tubular adenoma excised     [facility] COLONOSCOPY THRU STOMA, DIAGNOSTIC  2002     Tubular adenoma, hepatic flexure.       Social History:   reports that he quit smoking about 42 years ago. His smoking use included cigarettes. He has never used smokeless tobacco. He reports current alcohol use. He reports that he does not use drugs.    Family History:  Family History   Problem Relation Age of Onset     Cancer Father         Pancreatic CA,  age 82     Cardiovascular Mother          of ruptured AAA age 92     Cancer Brother         liver ca       Medications:  Current Outpatient Medications   Medication Sig Dispense Refill     atorvastatin (LIPITOR) 80 MG tablet TAKE 1 TABLET BY MOUTH EVERY DAY 90 tablet 0     Cholecalciferol (VITAMIN D PO) Take 1,000 Units by mouth daily        lisinopril (ZESTRIL) 5 MG tablet TAKE 1 TABLET BY MOUTH EVERY DAY 90 tablet 0     meclizine (ANTIVERT) 25 MG tablet Take 1 tablet (25 mg) by mouth 3 times daily as needed for dizziness 30 tablet 0     metoprolol succinate ER (TOPROL XL) 25 MG 24 hr tablet Take 0.5 tablets (12.5 mg) by mouth daily 45 tablet 3     nitroGLYcerin (NITROSTAT) 0.4 MG sublingual tablet Place 1 tablet (0.4 mg) under the tongue every 5 minutes as needed for chest pain up to 3 tablets per episode. 60 tablet 1     UNABLE TO FIND Take by mouth 2 times daily MEDICATION NAME: Preservation vitamin pills.       XARELTO ANTICOAGULANT 20 MG TABS tablet TAKE 1 TABLET BY MOUTH DAILY WITH DINNER 90 tablet 3     Allergies   Allergen Reactions     Metoprolol      Symptomatic bradycardia                   Again, thank you for allowing me to participate in the care of your patient.        Sincerely,        Rodrick Chang MD

## 2023-02-01 NOTE — PATIENT INSTRUCTIONS
CRYOTHERAPY    What is it?  Use of a very cold liquid, such as liquid nitrogen, to freeze and destroy abnormal skin cells that need to be removed    What should I expect?  Tenderness and redness  A small blister that might grow and fill with dark purple blood.  More than one treatment may be needed if the lesions do not go away.    How do I care for the treated area?  Gently wash the area with your hands when bathing.  Use a thin layer of VaselineÆ to help with healing.   The area should heal within 7-10 days.  Do not use an antibiotic or NeosporinÆ ointment.   You may take acetaminophen (TylenolÆ) for pain.     Call your Doctor if you have:  Severe pain  Signs of infection (warmth, redness, cloudy yellow drainage, and or a bad smell)  Questions or concerns Wound Care After a Biopsy    What is a skin biopsy?  A skin biopsy allows the doctor to examine a very small piece of tissue under the microscope to determine the diagnosis and the best treatment for the skin condition. A local anesthetic (numbing medicine)  is injected with a very small needle into the skin area to be tested. A small piece of skin is taken from the area. Sometimes a suture (stitch) is used.     What are the risks of a skin biopsy?  I will experience scar, bleeding, swelling, pain, crusting and redness. I may experience incomplete removal or recurrence. Risks of this procedure are excessive bleeding, bruising, infection, nerve damage, numbness, thick (hypertrophic or keloidal) scar and non-diagnostic biopsy.    How should I care for my wound for the first 24 hours?  Keep the wound dry and covered for 24 hours  If it bleeds, hold direct pressure on the area for 15 minutes. If bleeding does not stop then go to the emergency room  Avoid strenuous exercise the first 1-2 days or as your doctor instructs you    How should I care for the wound after 24 hours?  After 24 hours, remove the bandage  You may bathe or shower as normal  If you had a scalp  biopsy, you can shampoo as usual and can use shower water to clean the biopsy site daily  Clean the wound twice a day with gentle soap and water  Do not scrub, be gentle  Apply white petroleum/Vaseline after cleaning the wound with a cotton swab or a clean finger, and keep the site covered with a Bandaid /bandage. Bandages are not necessary with a scalp biopsy  If you are unable to cover the site with a Bandaid /bandage, re-apply ointment 2-3 times a day to keep the site moist. Moisture will help with healing  Avoid strenuous activity for first 1-2 days  Avoid lakes, rivers, pools, and oceans until the stitches are removed or the site is healed    How do I clean my wound?  Wash hands thoroughly with soap or use hand  before all wound care  Clean the wound with gentle soap and water  Apply white petroleum/Vaseline  to wound after it is clean  Replace the Bandaid /bandage to keep the wound covered for the first few days or as instructed by your doctor  If you had a scalp biopsy, warm shower water to the area on a daily basis should suffice    What should I use to clean my wound?   Cotton-tipped applicators (Qtips )  White petroleum jelly (Vaseline ). Use a clean new container and use Q-tips to apply.  Bandaids   as needed  Gentle soap     How should I care for my wound long term?  Do not get your wound dirty  Keep up with wound care for one week or until the area is healed.  A small scab will form and fall off by itself when the area is completely healed. The area will be red and will become pink in color as it heals. Sun protection is very important for how your scar will turn out. Sunscreen with an SPF 30 or greater is recommended once the area is healed.  You should have some soreness but it should be mild and slowly go away over several days. Talk to your doctor about using tylenol for pain,    When should I call my doctor?  If you have increased:   Pain or swelling  Pus or drainage (clear or slightly  yellow drainage is ok)  Temperature over 100F  Spreading redness or warmth around wound    When will I hear about my results?  The biopsy results can take 2 weeks to come back.  Your results will automatically release to [employer] before your provider has even reviewed them.  The clinic will call you with the results, send you a [employer] message, or have you schedule a follow-up clinic or phone time to discuss the results.  Contact our clinics if you do not hear from us in 2 weeks.    Who should I call with questions?  [facility]: [phone]  [facility]: [phone]  For urgent needs outside of business hours call the [facility] at [phone] and ask for the dermatology resident on call

## 2023-02-02 ENCOUNTER — HOSPITAL ENCOUNTER (OUTPATIENT)
Dept: PHYSICAL THERAPY | Facility: CLINIC | Age: 83
Discharge: HOME | End: 2023-02-02
Attending: EMERGENCY MEDICINE
Payer: COMMERCIAL

## 2023-02-02 DIAGNOSIS — R42 DIZZINESS: Primary | ICD-10-CM

## 2023-02-02 DIAGNOSIS — R26.89 IMPAIRMENT OF BALANCE: ICD-10-CM

## 2023-02-02 PROCEDURE — 97112 NEUROMUSCULAR REEDUCATION: CPT | Mod: GP | Performed by: PHYSICAL THERAPIST

## 2023-02-02 PROCEDURE — 97161 PT EVAL LOW COMPLEX 20 MIN: CPT | Mod: GP | Performed by: PHYSICAL THERAPIST

## 2023-02-02 NOTE — PROGRESS NOTES
02/02/23 1404   Quick Adds   Quick Adds Certification;Vestibular Eval   Type of Visit Initial OP PT Evaluation   General Information   Start of Care Date 02/02/23   Referring Physician Dr. Milton Yepez  (PCP is Dr. Renan Henry)   Orders Evaluate and Treat as Indicated   Order Date 01/09/23   Medical Diagnosis Dizziness R42   Onset of illness/injury or Date of Surgery 01/09/23   Precautions/Limitations no known precautions/limitations   Surgical/Medical history reviewed Yes   Pertinent history of current vestibular problem (include personal factors and/or comorbidities that impact the POC)    (previous bout of dizziness/vertigo several years ago)   Pertinent history of current problem (include personal factors and/or comorbidities that impact the POC) Patient presents to PT to address acute onset vertigo/dizziness that began on 1/9/23, was seen in ED for symptomatic management.  Workup rather unremarkable including CT scan.  Of note, patient was diagnosed and started on Rx Antibiotics for R ear infection around 1/7/23 - he indicates aural pain, R sided hearing loss, has since recovered after Rx antibiotics.  He does indicate acute dizziness bout ~2 yrs ago as well with residual chronic balance impairment. He was prescribed Meclizine but did not need to take it.  He indicates severe episode of vertigo resolved within a day or so, completed his course of Antibiotics.   PMH including CAD, DVT, PE, permanent A-fib, MI, HTN, chronic hearing loss and wears B hearing aides.  Currently, he reports dizziness/vertigo symptoms have significantly improved and is only noting mild dizziness with quick turns and ongoing chronic mild balance impairment.   Pertinent Visual History  no acute change in vision reported, no Rx lenses   Prior level of function comment Independent PLOF, enjoys gardening and golf prior to Covid, has reduced activity significantly since Covid pandemic and due to spouse's medical condition    Diagnostic Tests CT Scan   CT Results unremarkable   Current Community Support   ([employer] help with lawn management)   Patient role/Employment history Retired   [facility]   Home/Community Accessibility Comments multi level home   Assistive Devices Comments started using SEC with acute dizziness flare, has since discontinued using it   Patient/Family Goals Statement fully resolve dizziness symptoms and ensure his balance is 'recovered'   Fall Risk Screen   Fall screen completed by PT   Have you fallen 2 or more times in the past year? No   Have you fallen and had an injury in the past year? Yes  (dislocated finger, broken finger)   Is patient a fall risk? No;Department fall risk interventions implemented   Fall screen comments Mild balance impairment, low fall risk per DGI: 10/12 4-item DGI.  Will address with PT POC.   Abuse Screen (yes response referral indicated)   Feels Unsafe at Home or Work/School no   Feels Threatened by Someone no   Does Anyone Try to Keep You From Having Contact with Others or Doing Things Outside Your Home? no   Physical Signs of Abuse Present no   Patient needs abuse support services and resources No   System Outcome Measures   Outcome Measures BPPV   Dizziness Handicap Inventory (score out of 100) A decrease in score by 17.18 or greater indicates a clinically significant change in symptoms. 14   Pain   Patient currently in pain No   Cognitive Status Examination   Orientation orientation to person, place and time   Integumentary   Integumentary No deficits were identified   Posture   Posture Forward head position;Protracted shoulders   Range of Motion (ROM)   ROM Comment WFL extremities, reduced AROM c-spine rotation and extension   Strength   Strength Comments WFL and grossly symmetrical strength in extremities: Seated dorsiflexion B 5/5, knee ext 5/5, hip flexion R 4+/5, L 4/5, seated shoulder flexion B 4+/5   Bed Mobility   Bed Mobility Comments Independent,  mild 'whoosh' dizziness with supine > sit transition, denies vertigo   Transfer Skills   Transfer Comments Independent, mild use of hands   Gait   Gait Comments Independent ambulation, reciprocal gait pattern, though slowed pace and reduced push off/foot clearance noted, mildly wider base of support with reduced arm swing. Impaired posture with downward gaze.  Mild gait stability/path impairment noted with head turns.   Gait Special Tests   Gait Special Tests 25 FOOT TIMED WALK   Gait Special Tests 25 Foot Timed Walk   Seconds 10.18   Comments 0.75 m/sec   Balance   Balance Comments Good/fair static standing balance, romberg EO/EC x 30''.  Mild challenge and moderate sway noted with romberg EC indicating mild sensory integration deficit.  Mild dynamic gait balance impairment noted. Overall scoring above fall-risk threshold.   Balance Special Tests   Balance Special Tests Modified CTSIB Conditions   Balance Special Tests Modified CTSIB Conditions   Condition 1, seconds 30 Seconds   Condition 2, seconds 30 Seconds  (mild/moderate sway)   Modified CTSIB Comments WFL testing, mild challenge and moderate sway condition 2 indicating mild sensory integration deficit   Sensory Examination   Sensory Perception no deficits were identified   Coordination   Coordination no deficits were identified   Coordination Comments WNL finger to nose testing bilaterally, WFL functional mobility patterns   Cervicogenic Screen   Neck ROM Reduced AROM all planes, ~50% AROM rotation L/R and extension   Oculomotor Exam   Smooth Pursuit Normal   VOR Normal   VOR Cancellation Normal   Rapid Head Thrust Corrective Saccade R head thrust   Rapid Head Thrust Comments mild corrective saccade noted with R HIT on occasion   Convergence Testing Other   Convergence Testing Comments symmetrical vergence, although vergence point reported ~10-11 inches   Infrared Goggle Exam or Frenzel Lense Exam   Vestibular Suppressant in Last 24 Hours? No   Exam  completed with Infrared Goggles   Spontaneous Nystagmus Horizontal L   Gaze Evoked Nystagmus Horizontal L   Gaze Evoked Nystagmus comments follows sushma's law   Head Shake Horizontal Nystagmus Horizontal L   Head Shake Horizontal Nystagmus comments mild L beating nystagmus, mild dizziness reported   Bishop-Hallpike (right) Negative   Bishop-Hallpike (Left) Negative;Horizontal L   Jarad-Hallpike (left) comments mild sustained L beating nystagmus consistent with spontaneous pattern, asymptomatic   HSCC Supine Roll Test (Right) Negative   HSCC Supine Roll Test (Left) Horizontal L   HSCC Supine Roll Test (Left) Comments  mild sustained L beating nystagmus consistent with spontaneous pattern, asymptomatic   Dynamic Visual Acuity (DVA)   Static Acuity (LogMar) 0.2   Horizontal Head Movement at 2 Hz (LogMar) 0.6   DVA Comments 4-line degredation (norms 1-2 lines) with 2Hz testing, 9ft distance, seated, large chart, no glasses   Planned Therapy Interventions   Planned Therapy Interventions balance training;gait training;neuromuscular re-education   Clinical Impression   Criteria for Skilled Therapeutic Interventions Met yes, treatment indicated   PT Diagnosis Dizziness with impaired balance/gait stability   Influenced by the following impairments Dizziness, mild balance and sensory integration deficit, impaired posture, hearing loss   Functional limitations due to impairments Impaired stability with dynamic gait tasks, ambulating in busy environments, impaired stability with eyes closed/conforming surfaces   Clinical Presentation Stable/Uncomplicated   Clinical Presentation Rationale age, PMH, classic unilateral vestibular hypofunction presentation, DGI and DHI scores   Clinical Decision Making (Complexity) Low complexity   Therapy Frequency other (see comments)  (visits every 1-2 wks reducing frequency as indicated per symptomatic response/recovery)   Predicted Duration of Therapy Intervention (days/wks) up to 90 days   Risk &  Benefits of therapy have been explained Yes   Patient, Family & other staff in agreement with plan of care Yes   Clinical Impression Comments Vestibular assessment indicates unilateral R sided vestibular hypofunction, consistent with recently treated R sided ear infection.  No indication of BPPV on this date.  Mild static/dynamic balance impairment, of which he does endorse mild chronic balance condition as well.  Will treat with balance and gait training, as well as VOR/gaze stability training and continue to reassess.  Anticipate good prognosis for recovery to baseline/near baseline status give current functional level and amount of functional recovery since onset of symptoms.   Education Assessment   Barriers to Learning No barriers   GOALS   PT Eval Goals 1;2;3   Goal 1   Goal Identifier DHI   Goal Description Patient will score <5/100 on the DHI indicating improvement in dizziness symptoms, improved QOL, and improved tolerance with activity.   Goal Progress Baseline score 14/100   Target Date 05/03/23   Goal 2   Goal Identifier FGA   Goal Description Patient will score 24/30 or greater on the FGA indicating low fall risk and improved safety/confidence ambulating around home, yard, and community environments.   Goal Progress Baseline 4-item DGI score 10/12   Target Date 05/03/23   Goal 3   Goal Identifier Gait Speed   Goal Description Patient will demonstrate self selected gait speed > 1.0 m/sec indicating improvement in gait speed due to improvement in dizziness condition and reduced fall risk with community ambulation.   Goal Progress Baseline 0.75 m/sec   Target Date 05/03/23   Goal 4   Goal Identifier DVA   Goal Description Patient will demonstrate WNL testing with DVA testing indicating significant improvment in vestibular function and improved safety/tolerance with dynamic activities.   Goal Progress Baseline 4-line loss with 2Hz testing (norms 1-2 lines)   Target Date 05/03/23   Total Evaluation Time   PT  Michael, Low Complexity Minutes (66635) 32   Therapy Certification   Certification date from 02/02/23   Certification date to 05/03/23   Medical Diagnosis Dizziness R42

## 2023-02-02 NOTE — PROGRESS NOTES
[facility]                                                                                   OUTPATIENT PHYSICAL THERAPY FUNCTIONAL EVALUATION  PLAN OF TREATMENT FOR OUTPATIENT REHABILITATION  (COMPLETE FOR INITIAL CLAIMS ONLY)  Patient's Last Name, First Name, M.I.  YOB: 1940  SimixinOliver  AUREA     Provider's Name   [facility]   Medical Record No.  [id number]     Start of Care Date:  02/02/23   Onset Date:  01/09/23   Type:     _X__PT   ____OT  ____SLP Medical Diagnosis:  Dizziness R42     PT Diagnosis:  Dizziness with impaired balance/gait stability Visits from SOC:  1                              __________________________________________________________________________________  Plan of Treatment/Functional Goals:  balance training, gait training, neuromuscular re-education           GOALS  DHI  Patient will score <5/100 on the DHI indicating improvement in dizziness symptoms, improved QOL, and improved tolerance with activity.  05/03/23    FGA  Patient will score 24/30 or greater on the FGA indicating low fall risk and improved safety/confidence ambulating around home, yard, and community environments.  05/03/23    Gait Speed  Patient will demonstrate self selected gait speed > 1.0 m/sec indicating improvement in gait speed due to improvement in dizziness condition and reduced fall risk with community ambulation.  05/03/23    DVA  Patient will demonstrate WNL testing with DVA testing indicating significant improvment in vestibular function and improved safety/tolerance with dynamic activities.  05/03/23            Therapy Frequency:  other (see comments) (visits every 1-2 wks reducing frequency as indicated per symptomatic response/recovery)   Predicted Duration of Therapy Intervention:  up to 90 days    Elie Petty, PT                                    I CERTIFY  THE NEED FOR THESE SERVICES FURNISHED UNDER        THIS PLAN OF TREATMENT AND WHILE UNDER MY CARE     (Physician co-signature of this document indicates review and certification of the therapy plan).                Certification Date From:  02/02/23   Certification Date To:  05/03/23    Referring Provider:  Dr. Milton Yepez ( cert being sent to PCP: Dr. Renan Henry)    Initial Assessment  See Epic Evaluation- Start of Care Date: 02/02/23

## 2023-02-03 ENCOUNTER — TELEPHONE (OUTPATIENT)
Dept: DERMATOLOGY | Facility: CLINIC | Age: 83
End: 2023-02-03
Payer: COMMERCIAL

## 2023-02-03 DIAGNOSIS — C44.41 BASAL CELL CARCINOMA OF SCALP: Primary | ICD-10-CM

## 2023-02-03 LAB
PATH REPORT.COMMENTS IMP SPEC: ABNORMAL
PATH REPORT.COMMENTS IMP SPEC: ABNORMAL
PATH REPORT.COMMENTS IMP SPEC: YES
PATH REPORT.FINAL DX SPEC: ABNORMAL
PATH REPORT.GROSS SPEC: ABNORMAL
PATH REPORT.MICROSCOPIC SPEC OTHER STN: ABNORMAL
PATH REPORT.RELEVANT HX SPEC: ABNORMAL

## 2023-02-03 RX ORDER — IMIQUIMOD 12.5 MG/.25G
CREAM TOPICAL
Qty: 24 PACKET | Refills: 1 | Status: SHIPPED | OUTPATIENT
Start: 2023-02-03 | End: 2023-09-14

## 2023-02-03 NOTE — TELEPHONE ENCOUNTER
Called pt and discussed results and treatment options. Pt wants to try the Imiquimod, and has a 6 month follow up scheduled.    Please send to [id number] [place] at [phone].    Thank you,  Genna MOSCOSO RN  Dermatology   [phone]

## 2023-02-03 NOTE — CONFIDENTIAL NOTE
After discussion of risks and benefits, pt prefers trial of topical imquimod. Called in and message sent directly to pt w/ instructions on use

## 2023-02-22 ENCOUNTER — HOSPITAL ENCOUNTER (OUTPATIENT)
Dept: PHYSICAL THERAPY | Facility: CLINIC | Age: 83
Discharge: HOME | End: 2023-02-22
Payer: COMMERCIAL

## 2023-02-22 DIAGNOSIS — R42 DIZZINESS: Primary | ICD-10-CM

## 2023-02-22 DIAGNOSIS — R26.89 IMPAIRMENT OF BALANCE: ICD-10-CM

## 2023-02-22 PROCEDURE — 97112 NEUROMUSCULAR REEDUCATION: CPT | Mod: GP | Performed by: PHYSICAL THERAPIST

## 2023-02-26 DIAGNOSIS — E78.5 HYPERLIPIDEMIA LDL GOAL <100: ICD-10-CM

## 2023-02-27 RX ORDER — ATORVASTATIN CALCIUM 80 MG/1
TABLET, FILM COATED ORAL
Qty: 90 TABLET | Refills: 0 | Status: SHIPPED | OUTPATIENT
Start: 2023-02-27 | End: 2023-05-26

## 2023-03-08 ENCOUNTER — HOSPITAL ENCOUNTER (OUTPATIENT)
Dept: PHYSICAL THERAPY | Facility: CLINIC | Age: 83
Discharge: HOME | End: 2023-03-08
Payer: COMMERCIAL

## 2023-03-08 DIAGNOSIS — R42 DIZZINESS: ICD-10-CM

## 2023-03-08 DIAGNOSIS — R26.89 IMPAIRMENT OF BALANCE: Primary | ICD-10-CM

## 2023-03-08 PROCEDURE — 97750 PHYSICAL PERFORMANCE TEST: CPT | Mod: GP

## 2023-03-08 PROCEDURE — 97112 NEUROMUSCULAR REEDUCATION: CPT | Mod: GP

## 2023-03-08 NOTE — PROGRESS NOTES
03/08/23 1430   Signing Clinician's Name / Credentials   Signing clinician's name / credentials Kaley Kirkpatrick, PT, DPT   Functional Gait Assessment (VALERIE Singh, PIO Quiros, et al. (2004))   1. GAIT LEVEL SURFACE 2   2. CHANGE IN GAIT SPEED 3   3. GAIT WITH HORIZONTAL HEAD TURNS 2   4. GAIT WITH VERTICAL HEAD TURNS 2   5. GAIT AND PIVOT TURN 2  (mild LOB)   6. STEP OVER OBSTACLE 3   7. GAIT WITH NARROW BASE OF SUPPORT 2  (7 steps)   8. GAIT WITH EYES CLOSED 1  (11 seconds)   9. AMBULATING BACKWARDS 2   10. STEPS 2   Total Functional Gait Assessment Score   TOTAL SCORE: (MAXIMUM SCORE 30) 21     Functional Gait Assessment (FGA): The FGA assesses postural stability during various walking tasks.   Gait assistive device used: None    Scores of <22 /30 have been correlated with predicting falls in community-dwelling older adults according to Francisco & Thom 2010.   Scores of <18 /30 have been correlated with increased risk for falls in patients with Parkinsons Disease according to Aung Brumfield Zhou et al 2014.  Minimal Detectable Change for patients with acute/chronic stroke = 4.2 according to Ana & Jose David 2009  Minimal Detectable Change for patients with vestibular disorder = 8 according to Francisco & Thom 2010    Assessment (rationale for performing, application to patient s function & care plan): Patient displays ongoing walking balance deficits with increased risk of falls. Would benefit from completion of ongoing balance treatment.   (Minutes billed as physical performance test):  15

## 2023-04-02 DIAGNOSIS — I10 ESSENTIAL HYPERTENSION: ICD-10-CM

## 2023-04-04 RX ORDER — LISINOPRIL 5 MG/1
TABLET ORAL
Qty: 90 TABLET | Refills: 0 | Status: SHIPPED | OUTPATIENT
Start: 2023-04-04 | End: 2023-07-07

## 2023-04-26 ENCOUNTER — HOSPITAL ENCOUNTER (OUTPATIENT)
Dept: PHYSICAL THERAPY | Facility: CLINIC | Age: 83
Discharge: HOME | End: 2023-04-26
Payer: COMMERCIAL

## 2023-04-26 DIAGNOSIS — R26.89 IMPAIRMENT OF BALANCE: Primary | ICD-10-CM

## 2023-04-26 DIAGNOSIS — R42 DIZZINESS: ICD-10-CM

## 2023-04-26 PROCEDURE — 97750 PHYSICAL PERFORMANCE TEST: CPT | Mod: GP

## 2023-04-26 PROCEDURE — 97112 NEUROMUSCULAR REEDUCATION: CPT | Mod: GP

## 2023-05-03 ENCOUNTER — HOSPITAL ENCOUNTER (OUTPATIENT)
Dept: PHYSICAL THERAPY | Facility: CLINIC | Age: 83
Discharge: HOME | End: 2023-05-03
Payer: COMMERCIAL

## 2023-05-03 DIAGNOSIS — R26.89 IMPAIRMENT OF BALANCE: Primary | ICD-10-CM

## 2023-05-03 DIAGNOSIS — R42 DIZZINESS: ICD-10-CM

## 2023-05-03 PROCEDURE — 97112 NEUROMUSCULAR REEDUCATION: CPT | Mod: GP

## 2023-05-03 NOTE — PROGRESS NOTES
[facility]    Outpatient Physical Therapy Discharge Note  Patient: Oliver Baires  : 1940    Beginning/End Dates of Reporting Period:  2023 to 5/3/2023    Referring Provider: Dr. Milton Yepez    Therapy Diagnosis: Dizziness with impaired balance/gait stability      Client Self Report: Had ear wax in ears and had suspected ears wax. Things he has made significant progress in physical well being. Still has a morning staggering that when he stands up first thing in the morning, feels like he basically gets pushed - happens in morning and      Goals:  Goal Identifier DHI   Goal Description Patient will score <5/100 on the DHI indicating improvement in dizziness symptoms, improved QOL, and improved tolerance with activity.   Target Date 23   Date Met      Progress (detail required for progress note): 6/10 from Baseline score 14/100     Goal Identifier FGA   Goal Description Patient will score 24/30 or greater on the FGA indicating low fall risk and improved safety/confidence ambulating around home, yard, and community environments.   Target Date 23   Date Met  23   Progress (detail required for progress note): Baseline 4-item DGI score 10/12, 3/8/23 21/30, 2023 24/30     Goal Identifier Gait Speed   Goal Description Patient will demonstrate self selected gait speed > 1.0 m/sec indicating improvement in gait speed due to improvement in dizziness condition and reduced fall risk with community ambulation.   Target Date 23   Date Met  23   Progress (detail required for progress note): Baseline 0.75 m/sec, 3/8/23:0.90m/s, 5/3/2023 1.09 m/s     Goal Identifier DVA   Goal Description Patient will demonstrate WNL testing with DVA testing indicating significant improvment in vestibular function and improved safety/tolerance with dynamic activities.   Target Date 23    Date Met  03/08/23   Progress (detail required for progress note): Baseline 4-line loss with 2Hz testing (norms 1-2 lines), progressed to 2 line     Plan:  Discharge from therapy.    Discharge:    Reason for Discharge: Patient has met all goals.    Discharge Plan: Patient to continue home program. Patient continues to display visual preference with balance but has good use of balance reaction strategies and could understanding of ongoing HEP.

## 2023-05-26 DIAGNOSIS — E78.5 HYPERLIPIDEMIA LDL GOAL <100: ICD-10-CM

## 2023-05-26 RX ORDER — ATORVASTATIN CALCIUM 80 MG/1
TABLET, FILM COATED ORAL
Qty: 90 TABLET | Refills: 0 | Status: SHIPPED | OUTPATIENT
Start: 2023-05-26 | End: 2023-09-05

## 2023-05-29 NOTE — TELEPHONE ENCOUNTER
----- Message from Rodrick Chang MD sent at 2/3/2023  2:52 PM CST -----  Can you please call and let pt know, offer him mohs with Dr Linares, he may prefer imiquimod. We discussed that has a lower cure rate    A. Scalp:  - Pigmented basal cell carcinoma, superficial and nodular types - (see description     [facility]

## 2023-07-07 DIAGNOSIS — I10 ESSENTIAL HYPERTENSION: ICD-10-CM

## 2023-07-07 RX ORDER — LISINOPRIL 5 MG/1
TABLET ORAL
Qty: 90 TABLET | Refills: 0 | Status: SHIPPED | OUTPATIENT
Start: 2023-07-07 | End: 2023-10-03

## 2023-07-25 ENCOUNTER — IMMUNIZATION (OUTPATIENT)
Dept: NURSING | Facility: CLINIC | Age: 83
End: 2023-07-25
Payer: COMMERCIAL

## 2023-07-25 PROCEDURE — 91312 COVID-19 BIVALENT 12+ (PFIZER): CPT

## 2023-07-25 PROCEDURE — 0124A COVID-19 BIVALENT 12+ (PFIZER): CPT

## 2023-07-28 ENCOUNTER — MYC MEDICAL ADVICE (OUTPATIENT)
Dept: CARDIOLOGY | Facility: CLINIC | Age: 83
End: 2023-07-28
Payer: COMMERCIAL

## 2023-07-28 DIAGNOSIS — I25.10 CORONARY ARTERY DISEASE INVOLVING NATIVE CORONARY ARTERY OF NATIVE HEART WITHOUT ANGINA PECTORIS: Primary | ICD-10-CM

## 2023-08-05 ENCOUNTER — LAB (OUTPATIENT)
Dept: LAB | Facility: CLINIC | Age: 83
End: 2023-08-05
Payer: COMMERCIAL

## 2023-08-05 DIAGNOSIS — I25.10 CORONARY ARTERY DISEASE INVOLVING NATIVE CORONARY ARTERY OF NATIVE HEART WITHOUT ANGINA PECTORIS: ICD-10-CM

## 2023-08-05 LAB
ALT SERPL W P-5'-P-CCNC: 40 U/L (ref 0–70)
CHOLEST SERPL-MCNC: 153 MG/DL
HDLC SERPL-MCNC: 59 MG/DL
LDLC SERPL CALC-MCNC: 81 MG/DL
NONHDLC SERPL-MCNC: 94 MG/DL
TRIGL SERPL-MCNC: 65 MG/DL

## 2023-08-05 PROCEDURE — 84460 ALANINE AMINO (ALT) (SGPT): CPT

## 2023-08-05 PROCEDURE — 80061 LIPID PANEL: CPT

## 2023-08-05 PROCEDURE — 36415 COLL VENOUS BLD VENIPUNCTURE: CPT

## 2023-08-14 ENCOUNTER — VIRTUAL VISIT (OUTPATIENT)
Dept: CARDIOLOGY | Facility: CLINIC | Age: 83
End: 2023-08-14
Payer: COMMERCIAL

## 2023-08-14 DIAGNOSIS — I48.21 PERMANENT ATRIAL FIBRILLATION (H): ICD-10-CM

## 2023-08-14 DIAGNOSIS — I71.20 THORACIC AORTIC ANEURYSM WITHOUT RUPTURE, UNSPECIFIED PART (H): ICD-10-CM

## 2023-08-14 DIAGNOSIS — I25.10 CORONARY ARTERY DISEASE INVOLVING NATIVE CORONARY ARTERY OF NATIVE HEART WITHOUT ANGINA PECTORIS: Primary | ICD-10-CM

## 2023-08-14 PROCEDURE — 99213 OFFICE O/P EST LOW 20 MIN: CPT | Mod: VID | Performed by: INTERNAL MEDICINE

## 2023-08-14 RX ORDER — NITROGLYCERIN 0.4 MG/1
0.4 TABLET SUBLINGUAL EVERY 5 MIN PRN
Qty: 25 TABLET | Refills: 1 | Status: SHIPPED | OUTPATIENT
Start: 2023-08-14

## 2023-08-14 NOTE — PROGRESS NOTES
HPI and Plan:   I saw Mr. Baires today on a video visit.  He looks quite good..    He had a little trouble with his hearing aids he eventually got them and we continue the visit.  We reviewed his lipid numbers LDL is 80 ideal goal 75-80 is one of his better numbers he states his blood pressure numbers are good at home although is not checking with regularity I asked him to please do that.  He states he is not drinking nearly as much alcohol and is getting more exercise feels well with no angina no dizziness.  He had a BMP panel which I reviewed from earlier in the year.  I reviewed the echo from last year.  I would like to have him come in for actual office visit rather than a video visit next year we will do an echocardiogram to look at his aorta at 47 mm a couple years ago his mother did die of an abdominal aortic aneurysm.  We will also get lipids and electrolytes.  At this point I do not think we need a stress test.  Today was a video visit from 3:50 PM until 4:05 PM for 15 minutes I ordered up his nitroglycerin prescription and reviewed his other medications with him today.    Orders Placed This Encounter   Procedures    Lipid Profile    Basic metabolic panel    Follow-Up with Cardiology    Echocardiogram Complete     Orders Placed This Encounter   Medications    Multiple Vitamins-Minerals (PRESERVISION AREDS PO)    nitroGLYcerin (NITROSTAT) 0.4 MG sublingual tablet     Sig: Place 1 tablet (0.4 mg) under the tongue every 5 minutes as needed for chest pain up to 3 tablets per episode.     Dispense:  25 tablet     Refill:  1     Medications Discontinued During This Encounter   Medication Reason    nitroGLYcerin (NITROSTAT) 0.4 MG sublingual tablet Reorder (No AVS)         Encounter Diagnoses   Name Primary?    Coronary artery disease involving native coronary artery of native heart without angina pectoris Yes    Thoracic aortic aneurysm without rupture, unspecified part (H)     Permanent atrial fibrillation (H)         CURRENT MEDICATIONS:  Current Outpatient Medications   Medication Sig Dispense Refill    atorvastatin (LIPITOR) 80 MG tablet TAKE 1 TABLET BY MOUTH EVERY DAY 90 tablet 0    Cholecalciferol (VITAMIN D PO) Take 1,000 Units by mouth daily       lisinopril (ZESTRIL) 5 MG tablet TAKE 1 TABLET BY MOUTH EVERY DAY 90 tablet 0    metoprolol succinate ER (TOPROL XL) 25 MG 24 hr tablet Take 0.5 tablets (12.5 mg) by mouth daily 45 tablet 3    Multiple Vitamins-Minerals (PRESERVISION AREDS PO)       nitroGLYcerin (NITROSTAT) 0.4 MG sublingual tablet Place 1 tablet (0.4 mg) under the tongue every 5 minutes as needed for chest pain up to 3 tablets per episode. 25 tablet 1    XARELTO ANTICOAGULANT 20 MG TABS tablet TAKE 1 TABLET BY MOUTH DAILY WITH DINNER 90 tablet 3    imiquimod (ALDARA) 5 % external cream Apply nightly for 6 weeks to area of skin cancer. 24 packet 1    meclizine (ANTIVERT) 25 MG tablet Take 1 tablet (25 mg) by mouth 3 times daily as needed for dizziness 30 tablet 0    UNABLE TO FIND Take by mouth 2 times daily MEDICATION NAME: Preservation vitamin pills.         ALLERGIES     Allergies   Allergen Reactions    Metoprolol      Symptomatic bradycardia       PAST MEDICAL HISTORY:  Past Medical History:   Diagnosis Date    Aortic root dilatation (H) 12/31/2019    chest CTA    Arthritis     Left knee-L knee injury    AV block     Bradycardia     not on bb due to low HR    CAD (coronary artery disease)     Cath 6/2010- angioplasty & Xience 3.0x15mm stent to prox LAD; STEMI 10/2004- 3.5 x 33mm Cypher ALEJANDRA to distal RCA, angioplasty to VINCENT, Lcx25%, Lma 25%    DVT (deep venous thrombosis) (H)     on long term noac due to dvtx2 and now afib    ED (erectile dysfunction)     GERD (gastroesophageal reflux disease)     Hyperlipidemia     Hypertension     IBS (irritable bowel syndrome)     Impaired fasting glucose     Liver cyst 2017    right lower lobe    MI (myocardial infarction) (H)     STEMI 10/2004    Mild ascending  aorta dilatation (H) 2019    chest CTA    Permanent atrial fibrillation (H)     Personal history of colonic polyps     Tubular adenomas excised ,     Pneumonia 2004    pleural effusion    Pulmonary emboli (H) 2017    recurrent PE when off anticoag-now lifelong anticoag (unprovoked after car ride)- right leg dvt    Right ventricular dilation     suspect some sleep apnea, pt declines any sleep eval and states would not wear cpap anyway    Skin cancer     Squamous cell carcinoma        PAST SURGICAL HISTORY:  Past Surgical History:   Procedure Laterality Date    CHOLECYSTECTOMY      HC REMOVAL GALLBLADDER      HEART CATH, ANGIOPLASTY  2010    Angioplasty & Xience 3.0x15mm stent to prox LAD    HEART CATH, ANGIOPLASTY  10/30/2004    3.5x33mm Cypher stent to distal RCA; angioplasty to VINCENT    SURGICAL HISTORY OF -       Angioplasty/stenting of RCA    ZZ COLONOSCOPY THRU STOMA, DIAGNOSTIC      Single tubular adenoma excised    ZZHC COLONOSCOPY THRU STOMA, DIAGNOSTIC      Tubular adenoma, hepatic flexure.       FAMILY HISTORY:  Family History   Problem Relation Age of Onset    Cancer Father         Pancreatic CA,  age 82    Cardiovascular Mother          of ruptured AAA age 92    Cancer Brother         liver ca       SOCIAL HISTORY:  Social History     Socioeconomic History    Marital status:      Spouse name: None    Number of children: 2    Years of education: None    Highest education level: None   Occupational History     Employer: RETIRED   Tobacco Use    Smoking status: Former     Packs/day: 0.00     Types: Cigarettes     Quit date: 1980     Years since quittin.0    Smokeless tobacco: Never   Substance and Sexual Activity    Alcohol use: Not Currently     Comment: quit in january    Drug use: No    Sexual activity: Yes   Other Topics Concern    Caffeine Concern No     Comment: 3 cups in am    Weight Concern No    Special Diet No    Exercise Yes     Comment: 1-2  x week    Seat Belt Yes       Review of Systems:  Skin:        Eyes:       ENT:       Respiratory:       Cardiovascular:       Gastroenterology:      Genitourinary:       Musculoskeletal:       Neurologic:       Psychiatric:       Heme/Lymph/Imm:       Endocrine:         Physical Exam:  Vitals: There were no vitals taken for this visit.    Constitutional:           Skin:             Head:           Eyes:           Lymph:      ENT:           Neck:           Respiratory:            Cardiac:                                                           GI:           Extremities and Muscular Skeletal:                 Neurological:           Psych:         Recent Lab Results:  LIPID RESULTS:  Lab Results   Component Value Date    CHOL 153 08/05/2023    CHOL 157 06/08/2021    HDL 59 08/05/2023    HDL 62 06/08/2021    LDL 81 08/05/2023    LDL 83 06/08/2021    TRIG 65 08/05/2023    TRIG 62 06/08/2021    CHOLHDLRATIO 3.1 10/29/2015       LIVER ENZYME RESULTS:  Lab Results   Component Value Date    AST 40 01/09/2023    AST 42 06/08/2021    ALT 40 08/05/2023    ALT 41 06/08/2021       CBC RESULTS:  Lab Results   Component Value Date    WBC 6.5 01/09/2023    WBC 5.6 03/10/2021    RBC 4.26 (L) 01/09/2023    RBC 4.00 (L) 03/10/2021    HGB 14.2 01/09/2023    HGB 13.0 (L) 03/10/2021    HCT 41.2 01/09/2023    HCT 39.6 (L) 03/10/2021    MCV 97 01/09/2023    MCV 99 03/10/2021    MCH 33.3 (H) 01/09/2023    MCH 32.5 03/10/2021    MCHC 34.5 01/09/2023    MCHC 32.8 03/10/2021    RDW 13.2 01/09/2023    RDW 13.2 03/10/2021     01/09/2023     03/10/2021       BMP RESULTS:  Lab Results   Component Value Date     01/09/2023     06/08/2021    POTASSIUM 3.6 01/09/2023    POTASSIUM 4.1 06/08/2021    CHLORIDE 105 01/09/2023    CHLORIDE 108 06/08/2021    CO2 27 01/09/2023    CO2 28 06/08/2021    ANIONGAP 7 01/09/2023    ANIONGAP 3 06/08/2021     (H) 01/09/2023     (H) 06/08/2021    BUN 18 01/09/2023    BUN 14  06/08/2021    CR 0.72 01/09/2023    CR 0.88 06/08/2021    GFRESTIMATED >90 01/09/2023    GFRESTIMATED 80 06/08/2021    GFRESTBLACK >90 06/08/2021    DALIA 9.0 01/09/2023    DALIA 9.1 06/08/2021        A1C RESULTS:  No results found for: A1C    INR RESULTS:  Lab Results   Component Value Date    INR 0.96 10/24/2017    INR 0.98 06/23/2010           CC  Referred Self, MD  No address on file

## 2023-08-14 NOTE — PROGRESS NOTES
Oliver is a 83 year old who is being evaluated via a billable video visit.      How would you like to obtain your AVS? Mail a copy  If the video visit is dropped, the invitation should be resent by: Text to cell phone: [phone]  Will anyone else be joining your video visit? No      Review Of Systems  Skin: NEGATIVE  Eyes:Ears/Nose/Throat: NEGATIVE  Respiratory: NEGATIVE  Cardiovascular:occasional chest pain, swelling on right ankle, dizziness in the morning when getting up  Gastrointestinal: NEGATIVE  Genitourinary:NEGATIVE  Musculoskeletal: NEGATIVE  Neurologic: NEGATIVE  Psychiatric: NEGATIVE  Hematologic/Lymphatic/Immunologic: NEGATIVE  Endocrine:  NEGATIVE    Telephone number of patient: [phone]    Vitals:  BP:135/75 (3 weeks ago at last appointment)  Weight: 185lbs  Height: 6'2    Video-Visit Details    Type of service:  Video Visit     Originating Location (pt. Location): Home    Distant Location (provider location):  On-site  [facility]

## 2023-08-14 NOTE — LETTER
8/14/2023    Renan Henry MD  [address] [place] [id number]    RE: Oliver Baires       Dear Colleague,     I had the pleasure of seeing Oliver Baires in the [facility].  Oliver is a 83 year old who is being evaluated via a billable video visit.      How would you like to obtain your AVS? Mail a copy  If the video visit is dropped, the invitation should be resent by: Text to cell phone: [phone]  Will anyone else be joining your video visit? No      Review Of Systems  Skin: NEGATIVE  Eyes:Ears/Nose/Throat: NEGATIVE  Respiratory: NEGATIVE  Cardiovascular:occasional chest pain, swelling on right ankle, dizziness in the morning when getting up  Gastrointestinal: NEGATIVE  Genitourinary:NEGATIVE  Musculoskeletal: NEGATIVE  Neurologic: NEGATIVE  Psychiatric: NEGATIVE  Hematologic/Lymphatic/Immunologic: NEGATIVE  Endocrine:  NEGATIVE    Telephone number of patient: [phone]    Vitals:  BP:135/75 (3 weeks ago at last appointment)  Weight: 185lbs  Height: 6'2    Video-Visit Details    Type of service:  Video Visit     Originating Location (pt. Location): Home    Distant Location (provider location):  On-site  Platform used for Video Visit: [employer]    HPI and Plan:   I saw Mr. Baires today on a video visit.  He looks quite good..    He had a little trouble with his hearing aids he eventually got them and we continue the visit.  We reviewed his lipid numbers LDL is 80 ideal goal 75-80 is one of his better numbers he states his blood pressure numbers are good at home although is not checking with regularity I asked him to please do that.  He states he is not drinking nearly as much alcohol and is getting more exercise feels well with no angina no dizziness.  He had a BMP panel which I reviewed from earlier in the year.  I reviewed the echo from last year.  I would like to have him come in for actual office visit rather than a video visit next year we will do an echocardiogram to look at  his aorta at 47 mm a couple years ago his mother did die of an abdominal aortic aneurysm.  We will also get lipids and electrolytes.  At this point I do not think we need a stress test.  Today was a video visit from 3:50 PM until 4:05 PM for 15 minutes I ordered up his nitroglycerin prescription and reviewed his other medications with him today.    Orders Placed This Encounter   Procedures    Lipid Profile    Basic metabolic panel    Follow-Up with Cardiology    Echocardiogram Complete     Orders Placed This Encounter   Medications    Multiple Vitamins-Minerals (PRESERVISION AREDS PO)    nitroGLYcerin (NITROSTAT) 0.4 MG sublingual tablet     Sig: Place 1 tablet (0.4 mg) under the tongue every 5 minutes as needed for chest pain up to 3 tablets per episode.     Dispense:  25 tablet     Refill:  1     Medications Discontinued During This Encounter   Medication Reason    nitroGLYcerin (NITROSTAT) 0.4 MG sublingual tablet Reorder (No AVS)         Encounter Diagnoses   Name Primary?    Coronary artery disease involving native coronary artery of native heart without angina pectoris Yes    Thoracic aortic aneurysm without rupture, unspecified part (H)     Permanent atrial fibrillation (H)        CURRENT MEDICATIONS:  Current Outpatient Medications   Medication Sig Dispense Refill    atorvastatin (LIPITOR) 80 MG tablet TAKE 1 TABLET BY MOUTH EVERY DAY 90 tablet 0    Cholecalciferol (VITAMIN D PO) Take 1,000 Units by mouth daily       lisinopril (ZESTRIL) 5 MG tablet TAKE 1 TABLET BY MOUTH EVERY DAY 90 tablet 0    metoprolol succinate ER (TOPROL XL) 25 MG 24 hr tablet Take 0.5 tablets (12.5 mg) by mouth daily 45 tablet 3    Multiple Vitamins-Minerals (PRESERVISION AREDS PO)       nitroGLYcerin (NITROSTAT) 0.4 MG sublingual tablet Place 1 tablet (0.4 mg) under the tongue every 5 minutes as needed for chest pain up to 3 tablets per episode. 25 tablet 1    XARELTO ANTICOAGULANT 20 MG TABS tablet TAKE 1 TABLET BY MOUTH DAILY WITH  DINNER 90 tablet 3    imiquimod (ALDARA) 5 % external cream Apply nightly for 6 weeks to area of skin cancer. 24 packet 1    meclizine (ANTIVERT) 25 MG tablet Take 1 tablet (25 mg) by mouth 3 times daily as needed for dizziness 30 tablet 0    UNABLE TO FIND Take by mouth 2 times daily MEDICATION NAME: Preservation vitamin pills.         ALLERGIES     Allergies   Allergen Reactions    Metoprolol      Symptomatic bradycardia       PAST MEDICAL HISTORY:  Past Medical History:   Diagnosis Date    Aortic root dilatation (H) 12/31/2019    chest CTA    Arthritis     Left knee-L knee injury    AV block     Bradycardia     not on bb due to low HR    CAD (coronary artery disease)     Cath 6/2010- angioplasty & Xience 3.0x15mm stent to prox LAD; STEMI 10/2004- 3.5 x 33mm Cypher ALEJANDRA to distal RCA, angioplasty to VINCENT, Lcx25%, Lma 25%    DVT (deep venous thrombosis) (H)     on long term noac due to dvtx2 and now afib    ED (erectile dysfunction)     GERD (gastroesophageal reflux disease)     Hyperlipidemia     Hypertension     IBS (irritable bowel syndrome)     Impaired fasting glucose     Liver cyst 2017    right lower lobe    MI (myocardial infarction) (H)     STEMI 10/2004    Mild ascending aorta dilatation (H) 12/31/2019    chest CTA    Permanent atrial fibrillation (H)     Personal history of colonic polyps     Tubular adenomas excised 1999, 2002    Pneumonia 2004    pleural effusion    Pulmonary emboli (H) 2017    recurrent PE when off anticoag-now lifelong anticoag (unprovoked after car ride)- right leg dvt    Right ventricular dilation     suspect some sleep apnea, pt declines any sleep eval and states would not wear cpap anyway    Skin cancer     Squamous cell carcinoma        PAST SURGICAL HISTORY:  Past Surgical History:   Procedure Laterality Date    CHOLECYSTECTOMY      HC REMOVAL GALLBLADDER      HEART CATH, ANGIOPLASTY  6/23/2010    Angioplasty & Xience 3.0x15mm stent to prox LAD    HEART CATH, ANGIOPLASTY   10/30/2004    3.5x33mm Cypher stent to distal RCA; angioplasty to VINCENT    SURGICAL HISTORY OF -       [facility]gi[facility]      Single tubular adenoma excised    [facility]      Tubular adenoma, hepatic flexure.       FAMILY HISTORY:  Family History   Problem Relation Age of Onset    Cancer Father         Pancreatic CA,  age 82    Cardiovascular Mother          of ruptured AAA age 92    Cancer Brother         liver ca       SOCIAL HISTORY:  Social History     Socioeconomic History    Marital status:      Spouse name: None    Number of children: 2    Years of education: None    Highest education level: None   Occupational History     Employer: RETIRED   Tobacco Use    Smoking status: Former     Packs/day: 0.00     Types: Cigarettes     Quit date: 1980     Years since quittin.0    Smokeless tobacco: Never   Substance and Sexual Activity    Alcohol use: Not Currently     Comment: quit in january    Drug use: No    Sexual activity: Yes   Other Topics Concern    Caffeine Concern No     Comment: 3 cups in am    Weight Concern No    Special Diet No    Exercise Yes     Comment: 1-2 x week    Seat Belt Yes       Review of Systems:  Skin:        Eyes:       ENT:       Respiratory:       Cardiovascular:       Gastroenterology:      Genitourinary:       Musculoskeletal:       Neurologic:       Psychiatric:       Heme/Lymph/Imm:       Endocrine:         Physical Exam:  Vitals: There were no vitals taken for this visit.    Constitutional:           Skin:             Head:           Eyes:           Lymph:      ENT:           Neck:           Respiratory:            Cardiac:                                                           GI:           Extremities and Muscular Skeletal:                 Neurological:           Psych:         Recent Lab Results:  LIPID RESULTS:  Lab Results   Component Value Date    CHOL 153  08/05/2023    CHOL 157 06/08/2021    HDL 59 08/05/2023    HDL 62 06/08/2021    LDL 81 08/05/2023    LDL 83 06/08/2021    TRIG 65 08/05/2023    TRIG 62 06/08/2021    CHOLHDLRATIO 3.1 10/29/2015       LIVER ENZYME RESULTS:  Lab Results   Component Value Date    AST 40 01/09/2023    AST 42 06/08/2021    ALT 40 08/05/2023    ALT 41 06/08/2021       CBC RESULTS:  Lab Results   Component Value Date    WBC 6.5 01/09/2023    WBC 5.6 03/10/2021    RBC 4.26 (L) 01/09/2023    RBC 4.00 (L) 03/10/2021    HGB 14.2 01/09/2023    HGB 13.0 (L) 03/10/2021    HCT 41.2 01/09/2023    HCT 39.6 (L) 03/10/2021    MCV 97 01/09/2023    MCV 99 03/10/2021    MCH 33.3 (H) 01/09/2023    MCH 32.5 03/10/2021    MCHC 34.5 01/09/2023    MCHC 32.8 03/10/2021    RDW 13.2 01/09/2023    RDW 13.2 03/10/2021     01/09/2023     03/10/2021       BMP RESULTS:  Lab Results   Component Value Date     01/09/2023     06/08/2021    POTASSIUM 3.6 01/09/2023    POTASSIUM 4.1 06/08/2021    CHLORIDE 105 01/09/2023    CHLORIDE 108 06/08/2021    CO2 27 01/09/2023    CO2 28 06/08/2021    ANIONGAP 7 01/09/2023    ANIONGAP 3 06/08/2021     (H) 01/09/2023     (H) 06/08/2021    BUN 18 01/09/2023    BUN 14 06/08/2021    CR 0.72 01/09/2023    CR 0.88 06/08/2021    GFRESTIMATED >90 01/09/2023    GFRESTIMATED 80 06/08/2021    GFRESTBLACK >90 06/08/2021    DALIA 9.0 01/09/2023    DALIA 9.1 06/08/2021        A1C RESULTS:  No results found for: A1C    INR RESULTS:  Lab Results   Component Value Date    INR 0.96 10/24/2017    INR 0.98 06/23/2010           CC  Referred Self,     Thank you for allowing me to participate in the care of your patient.      Sincerely,     Tommy Villarreal MD     [facility]

## 2023-08-27 ENCOUNTER — HEALTH MAINTENANCE LETTER (OUTPATIENT)
Age: 83
End: 2023-08-27

## 2023-08-30 ENCOUNTER — OFFICE VISIT (OUTPATIENT)
Dept: DERMATOLOGY | Facility: CLINIC | Age: 83
End: 2023-08-30
Payer: COMMERCIAL

## 2023-08-30 DIAGNOSIS — L57.8 ACTINIC SKIN DAMAGE: ICD-10-CM

## 2023-08-30 DIAGNOSIS — L57.0 AK (ACTINIC KERATOSIS): Primary | ICD-10-CM

## 2023-08-30 DIAGNOSIS — Z85.828 HISTORY OF BASAL CELL CARCINOMA: ICD-10-CM

## 2023-08-30 DIAGNOSIS — L81.4 LENTIGINES: ICD-10-CM

## 2023-08-30 PROCEDURE — 99213 OFFICE O/P EST LOW 20 MIN: CPT | Mod: 25 | Performed by: STUDENT IN AN ORGANIZED HEALTH CARE EDUCATION/TRAINING PROGRAM

## 2023-08-30 PROCEDURE — 17000 DESTRUCT PREMALG LESION: CPT | Performed by: STUDENT IN AN ORGANIZED HEALTH CARE EDUCATION/TRAINING PROGRAM

## 2023-08-30 PROCEDURE — 17003 DESTRUCT PREMALG LES 2-14: CPT | Performed by: STUDENT IN AN ORGANIZED HEALTH CARE EDUCATION/TRAINING PROGRAM

## 2023-08-30 ASSESSMENT — PAIN SCALES - GENERAL: PAINLEVEL: NO PAIN (0)

## 2023-08-30 NOTE — PROGRESS NOTES
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    Diagnoses and all orders for this visit:    AK (actinic keratosis)  -     [place] DESTRUCT PREMALIGNANT LESION, FIRST [[id number]]  -     [place] DESTRUCT PREMALIGNANT LESION, 2-14 [[id number]]  - 7 aks on scalp   - see procedure note    Actinic skin damage\  Lentigines  - Reviewed the compounding benefits of incremental changes to sun protective clothing behaviors including increased frequency of sunscreen and sun protective clothing like broad brimmed hats and longsleeved UPF containing clothing    History of basal cell carcinoma  - resolved s/p 6 weeks imiquimod         Cryotherapy procedure note: After verbal consent and discussion of risks and benefits including but no limited to dyspigmentation/scar, blister, and pain, 7 aks on scalp was(were) treated with 1-2mm freeze border for 2 cycles with liquid nitrogen. Post cryotherapy instructions were provided.     Follow-up in 6-12 mo .       Staff Involved:  Staff Only    Rodrick Chang MD   of Dermatology  Department of Dermatology  [facility]      CC:   Chief Complaint   Patient presents with    Skin Check       History of Present Illness:  Mr. Oliver Baires is a 83 year old male who presents as a return patient.    Patient was last seen in February 2023.  At which time we froze around 20 actinic keratoses on the face and scalp.  Additionally a suspicious spot on the left scalp was noted and returned as a pigmented basal cell skin cancer.  After discussion of potential treatments patient opted for application of imiquimod.  Additionally there was a spot noted on the right superior helix  patient wishes to    Labs:      Physical exam:  Vitals: There were no vitals taken for this visit.  GEN: well developed, well-nourished, in no acute distress, in a pleasant mood.     SKIN: Anand phototype 1  - Full skin, which includes the head/face, both arms, chest, back, abdomen,both legs, genitalia and/or groin buttocks, digits and/or nails, was examined.  - No obvious evidence of recurrence at site of previous malignancy  - gritty pink papules on scalp  - Flat brown macules and patches in a sun exposed areas on face and extremities  - No other lesions of concern on areas examined.     Past Medical History:   Past Medical History:   Diagnosis Date    Aortic root dilatation (H) 12/31/2019    chest CTA    Arthritis     Left knee-L knee injury    AV block     Basal cell carcinoma     Bradycardia     not on bb due to low HR    CAD (coronary artery disease)     Cath 6/2010- angioplasty & Xience 3.0x15mm stent to prox LAD; STEMI 10/2004- 3.5 x 33mm Cypher ALEJANDRA to distal RCA, angioplasty to VINCENT, Lcx25%, Lma 25%    DVT (deep venous thrombosis) (H)     on long term noac due to dvtx2 and now afib    ED (erectile dysfunction)     GERD (gastroesophageal reflux disease)     Hyperlipidemia     Hypertension     IBS (irritable bowel syndrome)     Impaired fasting glucose     Liver cyst 2017    right lower lobe    MI (myocardial infarction) (H)     STEMI 10/2004    Mild ascending aorta dilatation (H) 12/31/2019    chest CTA    Permanent atrial fibrillation (H)     Personal history of colonic polyps     Tubular adenomas excised 1999, 2002    Pneumonia 2004    pleural effusion    Pulmonary emboli (H) 2017    recurrent PE when off anticoag-now lifelong anticoag (unprovoked after car ride)- right leg dvt    Right ventricular dilation     suspect some sleep apnea, pt declines any sleep eval and states would not wear cpap anyway    Skin cancer     Squamous cell carcinoma      Past Surgical History:   Procedure Laterality Date     CHOLECYSTECTOMY      HC REMOVAL GALLBLADDER      HEART CATH, ANGIOPLASTY  2010    Angioplasty & Xience 3.0x15mm stent to prox LAD    HEART CATH, ANGIOPLASTY  10/30/2004    3.5x33mm Cypher stent to distal RCA; angioplasty to VINCENT    SURGICAL HISTORY OF -       [facility]      Single tubular adenoma excised    [facility]      Tubular adenoma, hepatic flexure.       Social History:   reports that he quit smoking about 43 years ago. His smoking use included cigarettes. He has never used smokeless tobacco. He reports that he does not currently use alcohol. He reports that he does not use drugs.    Family History:  Family History   Problem Relation Age of Onset    Cancer Father         Pancreatic CA,  age 82    Cardiovascular Mother          of ruptured AAA age 92    Cancer Brother         liver ca       Medications:  Current Outpatient Medications   Medication Sig Dispense Refill    metoprolol succinate ER (TOPROL XL) 25 MG 24 hr tablet Take 0.5 tablets (12.5 mg) by mouth daily 45 tablet 3    atorvastatin (LIPITOR) 80 MG tablet TAKE 1 TABLET BY MOUTH EVERY DAY 90 tablet 0    Cholecalciferol (VITAMIN D PO) Take 1,000 Units by mouth daily       imiquimod (ALDARA) 5 % external cream Apply nightly for 6 weeks to area of skin cancer. 24 packet 1    lisinopril (ZESTRIL) 5 MG tablet TAKE 1 TABLET BY MOUTH EVERY DAY 90 tablet 0    meclizine (ANTIVERT) 25 MG tablet Take 1 tablet (25 mg) by mouth 3 times daily as needed for dizziness 30 tablet 0    Multiple Vitamins-Minerals (PRESERVISION AREDS PO)       nitroGLYcerin (NITROSTAT) 0.4 MG sublingual tablet Place 1 tablet (0.4 mg) under the tongue every 5 minutes as needed for chest pain up to 3 tablets per episode. 25 tablet 1    UNABLE TO FIND Take by mouth 2 times daily MEDICATION NAME: Preservation vitamin pills.      XARELTO ANTICOAGULANT 20 MG TABS tablet TAKE 1  TABLET BY MOUTH DAILY WITH DINNER 90 tablet 3     Allergies   Allergen Reactions    Metoprolol      Symptomatic bradycardia

## 2023-08-30 NOTE — LETTER
8/30/2023         RE: Oliver Baires  [address]  [facility] [address]        Dear Colleague,    Thank you for referring your patient, Oliver Baires, to the [facility]. Please see a copy of my visit note below.    [facility] Dermatology Note    Encounter Date: Aug 30, 2023    Dermatology Problem List:  #NMSC  - 10/06/2020 scc sterum   - 05/04/2018 SCCIS R shin   - 02/02/23 pBCC L scalp s/p resolution w/ 6 weeks imiquimod    Major PMHx  -   ______________________________________    Impression/Plan:  Oliver was seen today for skin check.    Diagnoses and all orders for this visit:    AK (actinic keratosis)  -     [place] DESTRUCT PREMALIGNANT LESION, FIRST [[id number]]  -     [place] DESTRUCT PREMALIGNANT LESION, 2-14 [[id number]]  - 7 aks on scalp   - see procedure note    Actinic skin damage\  Lentigines  - Reviewed the compounding benefits of incremental changes to sun protective clothing behaviors including increased frequency of sunscreen and sun protective clothing like broad brimmed hats and longsleeved UPF containing clothing    History of basal cell carcinoma  - resolved s/p 6 weeks imiquimod         Cryotherapy procedure note: After verbal consent and discussion of risks and benefits including but no limited to dyspigmentation/scar, blister, and pain, 7 aks on scalp was(were) treated with 1-2mm freeze border for 2 cycles with liquid nitrogen. Post cryotherapy instructions were provided.     Follow-up in 6-12 mo .       Staff Involved:  Staff Only    Rodrick Chang MD   of Dermatology  Department of Dermatology  [facility]      CC:   Chief Complaint   Patient presents with     Skin Check       History of Present Illness:  Mr. Oliver Baires is a 83 year old male who presents as a return patient.    Patient was last seen in February 2023.  At which time we froze around 20 actinic keratoses on the face and scalp.   Additionally a suspicious spot on the left scalp was noted and returned as a pigmented basal cell skin cancer.  After discussion of potential treatments patient opted for application of imiquimod.  Additionally there was a spot noted on the right superior helix patient wishes to    Labs:      Physical exam:  Vitals: There were no vitals taken for this visit.  GEN: well developed, well-nourished, in no acute distress, in a pleasant mood.     SKIN: Anand phototype 1  - Full skin, which includes the head/face, both arms, chest, back, abdomen,both legs, genitalia and/or groin buttocks, digits and/or nails, was examined.  - No obvious evidence of recurrence at site of previous malignancy  - gritty pink papules on scalp  - Flat brown macules and patches in a sun exposed areas on face and extremities  - No other lesions of concern on areas examined.     Past Medical History:   Past Medical History:   Diagnosis Date     Aortic root dilatation (H) 12/31/2019    chest CTA     Arthritis     Left knee-L knee injury     AV block      Basal cell carcinoma      Bradycardia     not on bb due to low HR     CAD (coronary artery disease)     Cath 6/2010- angioplasty & Xience 3.0x15mm stent to prox LAD; STEMI 10/2004- 3.5 x 33mm Cypher ALEJANDRA to distal RCA, angioplasty to VINCENT, Lcx25%, Lma 25%     DVT (deep venous thrombosis) (H)     on long term noac due to dvtx2 and now afib     ED (erectile dysfunction)      GERD (gastroesophageal reflux disease)      Hyperlipidemia      Hypertension      IBS (irritable bowel syndrome)      Impaired fasting glucose      Liver cyst 2017    right lower lobe     MI (myocardial infarction) (H)     STEMI 10/2004     Mild ascending aorta dilatation (H) 12/31/2019    chest CTA     Permanent atrial fibrillation (H)      Personal history of colonic polyps     Tubular adenomas excised 1999, 2002     Pneumonia 2004    pleural effusion     Pulmonary emboli (H) 2017    recurrent PE when off anticoag-now  lifelong anticoag (unprovoked after car ride)- right leg dvt     Right ventricular dilation     suspect some sleep apnea, pt declines any sleep eval and states would not wear cpap anyway     Skin cancer      Squamous cell carcinoma      Past Surgical History:   Procedure Laterality Date     CHOLECYSTECTOMY       HC REMOVAL GALLBLADDER       HEART CATH, ANGIOPLASTY  2010    Angioplasty & Xience 3.0x15mm stent to prox LAD     HEART CATH, ANGIOPLASTY  10/30/2004    3.5x33mm Cypher stent to distal RCA; angioplasty to VINCENT     SURGICAL HISTORY OF -       [facility]      Single tubular adenoma [facility]     [facility]      Tubular adenoma, hepatic flexure.       Social History:   reports that he quit smoking about 43 years ago. His smoking use included cigarettes. He has never used smokeless tobacco. He reports that he does not currently use alcohol. He reports that he does not use drugs.    Family History:  Family History   Problem Relation Age of Onset     Cancer Father         Pancreatic CA,  age 82     Cardiovascular Mother          of ruptured AAA age 92     Cancer Brother         liver ca       Medications:  Current Outpatient Medications   Medication Sig Dispense Refill     metoprolol succinate ER (TOPROL XL) 25 MG 24 hr tablet Take 0.5 tablets (12.5 mg) by mouth daily 45 tablet 3     atorvastatin (LIPITOR) 80 MG tablet TAKE 1 TABLET BY MOUTH EVERY DAY 90 tablet 0     Cholecalciferol (VITAMIN D PO) Take 1,000 Units by mouth daily        imiquimod (ALDARA) 5 % external cream Apply nightly for 6 weeks to area of skin cancer. 24 packet 1     lisinopril (ZESTRIL) 5 MG tablet TAKE 1 TABLET BY MOUTH EVERY DAY 90 tablet 0     meclizine (ANTIVERT) 25 MG tablet Take 1 tablet (25 mg) by mouth 3 times daily as needed for dizziness 30 tablet 0     Multiple Vitamins-Minerals (PRESERVISION AREDS PO)         nitroGLYcerin (NITROSTAT) 0.4 MG sublingual tablet Place 1 tablet (0.4 mg) under the tongue every 5 minutes as needed for chest pain up to 3 tablets per episode. 25 tablet 1     UNABLE TO FIND Take by mouth 2 times daily MEDICATION NAME: Preservation vitamin pills.       XARELTO ANTICOAGULANT 20 MG TABS tablet TAKE 1 TABLET BY MOUTH DAILY WITH DINNER 90 tablet 3     Allergies   Allergen Reactions     Metoprolol      Symptomatic bradycardia                 Again, thank you for allowing me to participate in the care of your patient.        Sincerely,        Rodrick Chang MD

## 2023-09-02 DIAGNOSIS — E78.5 HYPERLIPIDEMIA LDL GOAL <100: ICD-10-CM

## 2023-09-05 RX ORDER — ATORVASTATIN CALCIUM 80 MG/1
TABLET, FILM COATED ORAL
Qty: 90 TABLET | Refills: 0 | Status: SHIPPED | OUTPATIENT
Start: 2023-09-05 | End: 2023-12-04

## 2023-09-05 NOTE — TELEPHONE ENCOUNTER
Routing refill request to provider for review/approval because:  Rina given x1 and patient did not follow up, please advise    Appointments in Next Year      Sep 14, 2023  3:00 PM  (Arrive by 2:40 PM)  Pre-Operative Physical with Renan Henry MD  [facility] ([facility] ) [phone]

## 2023-09-14 ENCOUNTER — OFFICE VISIT (OUTPATIENT)
Dept: INTERNAL MEDICINE | Facility: CLINIC | Age: 83
End: 2023-09-14
Payer: COMMERCIAL

## 2023-09-14 VITALS
OXYGEN SATURATION: 97 % | DIASTOLIC BLOOD PRESSURE: 68 MMHG | BODY MASS INDEX: 23.96 KG/M2 | HEIGHT: 74 IN | TEMPERATURE: 98.2 F | HEART RATE: 62 BPM | WEIGHT: 186.7 LBS | SYSTOLIC BLOOD PRESSURE: 134 MMHG

## 2023-09-14 DIAGNOSIS — Z23 NEED FOR PROPHYLACTIC VACCINATION AND INOCULATION AGAINST INFLUENZA: ICD-10-CM

## 2023-09-14 DIAGNOSIS — I26.99 RECURRENT PULMONARY EMBOLISM (H): ICD-10-CM

## 2023-09-14 DIAGNOSIS — I48.21 PERMANENT ATRIAL FIBRILLATION (H): ICD-10-CM

## 2023-09-14 DIAGNOSIS — Z01.818 PREOP GENERAL PHYSICAL EXAM: Primary | ICD-10-CM

## 2023-09-14 DIAGNOSIS — H26.9 CATARACT OF BOTH EYES, UNSPECIFIED CATARACT TYPE: ICD-10-CM

## 2023-09-14 PROCEDURE — G0008 ADMIN INFLUENZA VIRUS VAC: HCPCS | Performed by: INTERNAL MEDICINE

## 2023-09-14 PROCEDURE — 90662 IIV NO PRSV INCREASED AG IM: CPT | Performed by: INTERNAL MEDICINE

## 2023-09-14 PROCEDURE — 99214 OFFICE O/P EST MOD 30 MIN: CPT | Mod: 25 | Performed by: INTERNAL MEDICINE

## 2023-09-14 ASSESSMENT — PAIN SCALES - GENERAL: PAINLEVEL: NO PAIN (0)

## 2023-09-14 NOTE — PROGRESS NOTES
[address] [address]  Phone: [phone]  Primary Provider: Hi Crystal  Pre-op Performing Provider: HI CRYSTAL      PREOPERATIVE EVALUATION:  Today's date: 9/14/2023    Oliver Baires is a 83 year old male who presents for a preoperative evaluation.      9/14/2023     2:44 PM   Additional Questions   Roomed by Daphnie MOSCOSO CMA       Surgical Information:  Surgery/Procedure: cataract removal left then right  Surgery Location: [facility]  Surgeon: Dr. Cuenca  Surgery Date: 9/29/23 and 10/20/23  Time of Surgery: TBD  Where patient plans to recover: At home with family  Fax number for surgical facility: [phone]    Assessment & Plan     The proposed surgical procedure is considered LOW risk.    Preop general physical exam      Cataract of both eyes, unspecified cataract type      Recurrent pulmonary embolism (H)  Lifelong anticoagulation    Permanent atrial fibrillation (H)  Anticoagulation            - No identified additional risk factors other than previously addressed    Antiplatelet or Anticoagulation Medication Instructions:   - Bleeding risk is low for this procedure (e.g. dental, skin, cataract).    Additional Medication Instructions:  Patient is to take all scheduled medications on the day of surgery    RECOMMENDATION:  APPROVAL GIVEN to proceed with proposed procedure, without further diagnostic evaluation.            Subjective       HPI related to upcoming procedure:         9/14/2023     2:32 PM   Preop Questions   1. Have you ever had a heart attack or stroke? YES -    2. Have you ever had surgery on your heart or blood vessels, such as a stent placement, a coronary artery bypass, or surgery on an artery in your head, neck, heart, or legs? YES -    3. Do you have chest pain with activity? YES -    4. Do you have a history of  heart failure? No   5. Do you currently have a cold, bronchitis or  symptoms of other infection? No   6. Do you have a cough, shortness of breath, or wheezing? No   7. Do you or anyone in your family have previous history of blood clots? YES -    8. Do you or does anyone in your family have a serious bleeding problem such as prolonged bleeding following surgeries or cuts? No   9. Have you ever had problems with anemia or been told to take iron pills? No   10. Have you had any abnormal blood loss such as black, tarry or bloody stools? No   11. Have you ever had a blood transfusion? No   12. Are you willing to have a blood transfusion if it is medically needed before, during, or after your surgery? Yes   13. Have you or any of your relatives ever had problems with anesthesia? No   14. Do you have sleep apnea, excessive snoring or daytime drowsiness? UNKNOWN -    15. Do you have any artifical heart valves or other implanted medical devices like a pacemaker, defibrillator, or continuous glucose monitor? No   16. Do you have artificial joints? No   17. Are you allergic to latex? No       Health Care Directive:  Patient has a Health Care Directive on file      Preoperative Review of :   reviewed - no record of controlled substances prescribed.      Status of Chronic Conditions:  A-FIB - Patient has a longstanding history of chronic A-fib currently on rate control. Current treatment regimen includes Rivaroxaban for stroke prevention and denies significant symptoms of lightheadedness, palpitations or dyspnea.     HYPERLIPIDEMIA - Patient has a long history of significant Hyperlipidemia requiring medication for treatment with recent good control. Patient reports no problems or side effects with the medication.     HYPERTENSION - Patient has longstanding history of HTN , currently denies any symptoms referable to elevated blood pressure. Specifically denies chest pain, palpitations, dyspnea, orthopnea, PND or peripheral edema. Blood pressure readings have been in normal range. Current  medication regimen is as listed below. Patient denies any side effects of medication.     Review of Systems  CONSTITUTIONAL: NEGATIVE for fever, chills, change in weight  ENT/MOUTH: NEGATIVE for ear, mouth and throat problems  RESP: NEGATIVE for significant cough or SOB  CV: NEGATIVE for chest pain, palpitations or peripheral edema    Patient Active Problem List    Diagnosis Date Noted    A-fib (H) 02/15/2021     Priority: Medium     On echo 2/21 and previously noted on ekg      Recurrent pulmonary embolism (H) 04/13/2017     Priority: Medium     --Initial: 1/28/17, associated with RLE DVT, presumably from long car ride to [place].  Completed 6 months' Xarelto.  --Recurrent PE 10/24/17, unprovoked.  --Lifelong anticoagulation      Hyperlipidemia with target LDL less than 100 07/07/2015     Priority: Medium     Diagnosis updated by automated process. Provider to review and confirm.      CAD (coronary artery disease)      Priority: Medium     Cath 6/2010- angioplasty & Xience 3.0x15mm stent to prox LAD; STEMI 10/2004- 3.5 x 33mm Cypher ALEJANDRA to distal RCA, angioplasty to [facility]      Hx of myocardial infarction      Priority: Medium     STEMI 10/2004      Bradycardia      Priority: Medium    Arthritis      Priority: Medium     Left knee      AV block      Priority: Medium    Advanced directives, counseling/discussion 04/05/2012     Priority: Medium     Patient states has Advance Directive and will bring in a copy to clinic. 4/5/2012         Essential hypertension 07/13/2009     Priority: Medium    GERD (gastroesophageal reflux disease)      Priority: Medium    Impotence of organic origin 04/27/2004     Priority: Medium      Past Medical History:   Diagnosis Date    Aortic root dilatation (H) 12/31/2019    chest CTA    Arthritis     Left knee-L knee injury    AV block     Basal cell carcinoma     Bradycardia     not on bb due to low HR    CAD (coronary artery disease)     Cath 6/2010- angioplasty & Xience 3.0x15mm stent to prox  LAD; STEMI 10/2004- 3.5 x 33mm Cypher ALEJANDRA to distal RCA, angioplasty to [facility]ENT, Lcx25%, Lma 25%    DVT (deep venous thrombosis) (H)     on long term noac due to dvtx2 and now afib    ED (erectile dysfunction)     GERD (gastroesophageal reflux disease)     Hyperlipidemia     Hypertension     IBS (irritable bowel syndrome)     Impaired fasting glucose     Liver cyst 2017    right lower lobe    MI (myocardial infarction) (H)     STEMI 10/2004    Mild ascending aorta dilatation (H) 12/31/2019    chest CTA    Permanent atrial fibrillation (H)     Personal history of colonic polyps     Tubular adenomas excised 1999, 2002    Pneumonia 2004    pleural effusion    Pulmonary emboli (H) 2017    recurrent PE when off anticoag-now lifelong anticoag (unprovoked after car ride)- right leg dvt    Right ventricular dilation     suspect some sleep apnea, pt declines any sleep eval and states would not wear cpap anyway    Skin cancer     Squamous cell carcinoma      Past Surgical History:   Procedure Laterality Date    CHOLECYSTECTOMY      HC REMOVAL GALLBLADDER      HEART CATH, ANGIOPLASTY  6/23/2010    Angioplasty & Xience 3.0x15mm stent to prox LAD    HEART CATH, ANGIOPLASTY  10/30/2004    3.5x33mm Cypher stent to distal RCA; angioplasty to [facility]INCENT    SURGICAL HISTORY OF -   11/04    Angioplasty/stenting of RCA    [facility] COLONOSCOPY THRU STOMA, DIAGNOSTIC  1999    Single tubular adenoma excised    [facility] COLONOSCOPY THRU STOMA, DIAGNOSTIC  2002    Tubular adenoma, hepatic flexure.     Current Outpatient Medications   Medication Sig Dispense Refill    atorvastatin (LIPITOR) 80 MG tablet TAKE 1 TABLET BY MOUTH EVERY DAY 90 tablet 0    Cholecalciferol (VITAMIN D PO) Take 1,000 Units by mouth daily       lisinopril (ZESTRIL) 5 MG tablet TAKE 1 TABLET BY MOUTH EVERY DAY 90 tablet 0    metoprolol succinate ER (TOPROL XL) 25 MG 24 hr tablet Take 0.5 tablets (12.5 mg) by mouth daily 45 tablet 3    Multiple Vitamins-Minerals (PRESERVISION AREDS PO)     "   nitroGLYcerin (NITROSTAT) 0.4 MG sublingual tablet Place 1 tablet (0.4 mg) under the tongue every 5 minutes as needed for chest pain up to 3 tablets per episode. 25 tablet 1    XARELTO ANTICOAGULANT 20 MG TABS tablet TAKE 1 TABLET BY MOUTH DAILY WITH DINNER 90 tablet 3       Allergies   Allergen Reactions    Metoprolol      Symptomatic bradycardia        Social History     Tobacco Use    Smoking status: Former     Packs/day: 0.00     Types: Cigarettes     Quit date: 1980     Years since quittin.0    Smokeless tobacco: Never   Substance Use Topics    Alcohol use: Not Currently     Comment: quit in january       History   Drug Use No         Objective     /68   Pulse 62   Temp 98.2  F (36.8  C) (Oral)   Ht 1.88 m (6' 2\")   Wt 84.7 kg (186 lb 11.2 oz)   SpO2 97%   BMI 23.97 kg/m      Physical Exam  GENERAL APPEARANCE: healthy, alert and no distress  HENT: ear canals and TM's normal and nose and mouth without ulcers or lesions  RESP: lungs clear to auscultation - no rales, rhonchi or wheezes  CV: regular rate and rhythm, normal S1 S2, no S3 or S4 and no murmur, click or rub   ABDOMEN: soft, nontender, no HSM or masses and bowel sounds normal  NEURO: Normal strength and tone, sensory exam grossly normal, mentation intact and speech normal    Recent Labs   Lab Test 23  0906 10/10/22  1112   HGB 14.2  --      --     140   POTASSIUM 3.6 4.0   CR 0.72 0.90        Diagnostics:  No labs were ordered during this visit.   No EKG required for low risk surgery (cataract, skin procedure, breast biopsy, etc).    Revised Cardiac Risk Index (RCRI):  The patient has the following serious cardiovascular risks for perioperative complications:   - No serious cardiac risks = 0 points     RCRI Interpretation: 0 points: Class I (very low risk - 0.4% complication rate)         Signed Electronically by: Renan Henry MD  Copy of this evaluation report is provided to requesting physician.      "

## 2023-09-15 NOTE — PROGRESS NOTES
Pre op physical electronically faxed to [facility] at [phone].  Fax confirmed

## 2023-10-02 DIAGNOSIS — I10 ESSENTIAL HYPERTENSION: ICD-10-CM

## 2023-10-03 RX ORDER — LISINOPRIL 5 MG/1
TABLET ORAL
Qty: 90 TABLET | Refills: 0 | Status: SHIPPED | OUTPATIENT
Start: 2023-10-03 | End: 2024-01-03

## 2023-10-03 NOTE — TELEPHONE ENCOUNTER
Prescription approved per [facility] Refill Protocol.  Darcy Rahman, RN  [facility] Triage Nurse

## 2023-10-18 DIAGNOSIS — I26.99 RECURRENT PULMONARY EMBOLISM (H): ICD-10-CM

## 2023-10-18 RX ORDER — RIVAROXABAN 20 MG/1
20 TABLET, FILM COATED ORAL
Qty: 90 TABLET | Refills: 3 | Status: SHIPPED | OUTPATIENT
Start: 2023-10-18

## 2023-10-27 DIAGNOSIS — I77.810 AORTIC ROOT DILATION (H): ICD-10-CM

## 2023-10-27 RX ORDER — METOPROLOL SUCCINATE 25 MG/1
12.5 TABLET, EXTENDED RELEASE ORAL DAILY
Qty: 45 TABLET | Refills: 3 | Status: SHIPPED | OUTPATIENT
Start: 2023-10-27

## 2023-12-02 DIAGNOSIS — E78.5 HYPERLIPIDEMIA LDL GOAL <100: ICD-10-CM

## 2023-12-04 RX ORDER — ATORVASTATIN CALCIUM 80 MG/1
TABLET, FILM COATED ORAL
Qty: 90 TABLET | Refills: 2 | Status: SHIPPED | OUTPATIENT
Start: 2023-12-04 | End: 2024-08-26

## 2024-01-03 DIAGNOSIS — I10 ESSENTIAL HYPERTENSION: ICD-10-CM

## 2024-01-03 RX ORDER — LISINOPRIL 5 MG/1
TABLET ORAL
Qty: 90 TABLET | Refills: 0 | Status: SHIPPED | OUTPATIENT
Start: 2024-01-03 | End: 2024-04-03

## 2024-01-11 ENCOUNTER — OFFICE VISIT (OUTPATIENT)
Dept: INTERNAL MEDICINE | Facility: CLINIC | Age: 84
End: 2024-01-11
Payer: COMMERCIAL

## 2024-01-11 VITALS
HEIGHT: 74 IN | DIASTOLIC BLOOD PRESSURE: 64 MMHG | WEIGHT: 177.9 LBS | BODY MASS INDEX: 22.83 KG/M2 | TEMPERATURE: 97.2 F | HEART RATE: 64 BPM | OXYGEN SATURATION: 97 % | SYSTOLIC BLOOD PRESSURE: 112 MMHG

## 2024-01-11 DIAGNOSIS — T14.8XXA BRUISE: Primary | ICD-10-CM

## 2024-01-11 DIAGNOSIS — Z79.01 CHRONIC ANTICOAGULATION: ICD-10-CM

## 2024-01-11 PROCEDURE — 99212 OFFICE O/P EST SF 10 MIN: CPT | Performed by: INTERNAL MEDICINE

## 2024-01-11 ASSESSMENT — PAIN SCALES - GENERAL: PAINLEVEL: NO PAIN (0)

## 2024-01-11 NOTE — PROGRESS NOTES
"  Assessment & Plan   Bruise  -small resolving bruise. reassured                 Crow Yanez MD  [facility] TRINA Boyd is a 83 year old, presenting for the following health issues:  Skin Discoloration        1/11/2024     6:51 AM   Additional Questions   Roomed by Daphnie MOSCOSO CMA     History of Present Illness       Reason for visit:  Yellow skin blotch on abdomen  Symptom onset:  1-3 days ago    He eats 2-3 servings of fruits and vegetables daily.He consumes 0 sweetened beverage(s) daily.He exercises with enough effort to increase his heart rate 10 to 19 minutes per day.  He exercises with enough effort to increase his heart rate 5 days per week. He is missing 1 dose(s) of medications per week.             Review of Systems         Objective    /64   Pulse 64   Temp 97.2  F (36.2  C) (Tympanic)   Ht 1.88 m (6' 2\")   Wt 80.7 kg (177 lb 14.4 oz)   SpO2 97%   BMI 22.84 kg/m    Body mass index is 22.84 kg/m .  Physical Exam   GENERAL: healthy, alert and no distress  SKIN: no suspicious lesions or rashes other than a 4-5 cm circular shaped yellowish/green macular lesion on abd wall consistent with resolving bruise                      "

## 2024-04-03 DIAGNOSIS — I10 ESSENTIAL HYPERTENSION: ICD-10-CM

## 2024-04-03 RX ORDER — LISINOPRIL 5 MG/1
TABLET ORAL
Qty: 90 TABLET | Refills: 0 | Status: SHIPPED | OUTPATIENT
Start: 2024-04-03 | End: 2024-09-11

## 2024-05-21 ENCOUNTER — HOSPITAL ENCOUNTER (EMERGENCY)
Facility: CLINIC | Age: 84
Discharge: HOME | End: 2024-05-22
Attending: EMERGENCY MEDICINE | Admitting: EMERGENCY MEDICINE
Payer: COMMERCIAL

## 2024-05-21 ENCOUNTER — APPOINTMENT (OUTPATIENT)
Dept: MRI IMAGING | Facility: CLINIC | Age: 84
End: 2024-05-21
Attending: EMERGENCY MEDICINE
Payer: COMMERCIAL

## 2024-05-21 VITALS
TEMPERATURE: 98.4 F | HEART RATE: 60 BPM | DIASTOLIC BLOOD PRESSURE: 76 MMHG | SYSTOLIC BLOOD PRESSURE: 101 MMHG | OXYGEN SATURATION: 98 % | RESPIRATION RATE: 16 BRPM | HEIGHT: 74 IN | BODY MASS INDEX: 24.38 KG/M2 | WEIGHT: 190 LBS

## 2024-05-21 DIAGNOSIS — R22.0 FACIAL SWELLING: ICD-10-CM

## 2024-05-21 DIAGNOSIS — E87.6 HYPOKALEMIA: ICD-10-CM

## 2024-05-21 LAB
ANION GAP SERPL CALCULATED.3IONS-SCNC: 5 MMOL/L (ref 7–15)
ATRIAL RATE - MUSE: NORMAL BPM
BASOPHILS # BLD AUTO: 0 10E3/UL (ref 0–0.2)
BASOPHILS NFR BLD AUTO: 0 %
BUN SERPL-MCNC: 12.2 MG/DL (ref 8–23)
CA-I BLD-MCNC: 4.9 MG/DL (ref 4.4–5.2)
CALCIUM SERPL-MCNC: 6.8 MG/DL (ref 8.8–10.2)
CHLORIDE SERPL-SCNC: 115 MMOL/L (ref 98–107)
CREAT SERPL-MCNC: 0.63 MG/DL (ref 0.67–1.17)
DEPRECATED HCO3 PLAS-SCNC: 23 MMOL/L (ref 22–29)
DIASTOLIC BLOOD PRESSURE - MUSE: NORMAL MMHG
EGFRCR SERPLBLD CKD-EPI 2021: >90 ML/MIN/1.73M2
EOSINOPHIL # BLD AUTO: 0.3 10E3/UL (ref 0–0.7)
EOSINOPHIL NFR BLD AUTO: 5 %
ERYTHROCYTE [DISTWIDTH] IN BLOOD BY AUTOMATED COUNT: 13.4 % (ref 10–15)
GLUCOSE SERPL-MCNC: 115 MG/DL (ref 70–99)
HCT VFR BLD AUTO: 36 % (ref 40–53)
HGB BLD-MCNC: 11.7 G/DL (ref 13.3–17.7)
IMM GRANULOCYTES # BLD: 0 10E3/UL
IMM GRANULOCYTES NFR BLD: 0 %
INTERPRETATION ECG - MUSE: NORMAL
LYMPHOCYTES # BLD AUTO: 1.1 10E3/UL (ref 0.8–5.3)
LYMPHOCYTES NFR BLD AUTO: 19 %
MCH RBC QN AUTO: 32.8 PG (ref 26.5–33)
MCHC RBC AUTO-ENTMCNC: 32.5 G/DL (ref 31.5–36.5)
MCV RBC AUTO: 101 FL (ref 78–100)
MONOCYTES # BLD AUTO: 0.7 10E3/UL (ref 0–1.3)
MONOCYTES NFR BLD AUTO: 12 %
NEUTROPHILS # BLD AUTO: 3.7 10E3/UL (ref 1.6–8.3)
NEUTROPHILS NFR BLD AUTO: 64 %
NRBC # BLD AUTO: 0 10E3/UL
NRBC BLD AUTO-RTO: 0 /100
P AXIS - MUSE: NORMAL DEGREES
PLATELET # BLD AUTO: 153 10E3/UL (ref 150–450)
POTASSIUM SERPL-SCNC: 2.9 MMOL/L (ref 3.4–5.3)
PR INTERVAL - MUSE: NORMAL MS
QRS DURATION - MUSE: 88 MS
QT - MUSE: 398 MS
QTC - MUSE: 377 MS
R AXIS - MUSE: -43 DEGREES
RBC # BLD AUTO: 3.57 10E6/UL (ref 4.4–5.9)
SODIUM SERPL-SCNC: 143 MMOL/L (ref 135–145)
SYSTOLIC BLOOD PRESSURE - MUSE: NORMAL MMHG
T AXIS - MUSE: 42 DEGREES
VENTRICULAR RATE- MUSE: 54 BPM
WBC # BLD AUTO: 5.8 10E3/UL (ref 4–11)

## 2024-05-21 PROCEDURE — 70553 MRI BRAIN STEM W/O & W/DYE: CPT

## 2024-05-21 PROCEDURE — 70543 MRI ORBT/FAC/NCK W/O &W/DYE: CPT

## 2024-05-21 PROCEDURE — 93005 ELECTROCARDIOGRAM TRACING: CPT

## 2024-05-21 PROCEDURE — 99285 EMERGENCY DEPT VISIT HI MDM: CPT | Mod: 25

## 2024-05-21 PROCEDURE — A9585 GADOBUTROL INJECTION: HCPCS | Performed by: EMERGENCY MEDICINE

## 2024-05-21 PROCEDURE — 80048 BASIC METABOLIC PNL TOTAL CA: CPT | Performed by: EMERGENCY MEDICINE

## 2024-05-21 PROCEDURE — 82330 ASSAY OF CALCIUM: CPT | Performed by: EMERGENCY MEDICINE

## 2024-05-21 PROCEDURE — 85025 COMPLETE CBC W/AUTO DIFF WBC: CPT | Performed by: EMERGENCY MEDICINE

## 2024-05-21 PROCEDURE — 99207 PR NO CHARGE LOS: CPT | Performed by: INTERNAL MEDICINE

## 2024-05-21 PROCEDURE — 255N000002 HC RX 255 OP 636: Performed by: EMERGENCY MEDICINE

## 2024-05-21 PROCEDURE — 250N000013 HC RX MED GY IP 250 OP 250 PS 637: Performed by: EMERGENCY MEDICINE

## 2024-05-21 PROCEDURE — 36415 COLL VENOUS BLD VENIPUNCTURE: CPT | Performed by: EMERGENCY MEDICINE

## 2024-05-21 RX ORDER — POTASSIUM CHLORIDE 1.5 G/1.58G
40 POWDER, FOR SOLUTION ORAL ONCE
Status: COMPLETED | OUTPATIENT
Start: 2024-05-21 | End: 2024-05-21

## 2024-05-21 RX ORDER — GADOBUTROL 604.72 MG/ML
9 INJECTION INTRAVENOUS ONCE
Status: COMPLETED | OUTPATIENT
Start: 2024-05-21 | End: 2024-05-21

## 2024-05-21 RX ADMIN — POTASSIUM CHLORIDE 40 MEQ: 1.5 POWDER, FOR SOLUTION ORAL at 23:04

## 2024-05-21 RX ADMIN — GADOBUTROL 9 ML: 604.72 INJECTION INTRAVENOUS at 22:13

## 2024-05-21 ASSESSMENT — ACTIVITIES OF DAILY LIVING (ADL)
ADLS_ACUITY_SCORE: 35

## 2024-05-21 NOTE — ED NOTES
Bed: ED08  Expected date:   Expected time:   Means of arrival:   Comments:  A 543 84 M right eye drooping NOT CALLING STROKE eta 1846

## 2024-05-22 RX ORDER — POTASSIUM CHLORIDE 1500 MG/1
20 TABLET, EXTENDED RELEASE ORAL 2 TIMES DAILY
Qty: 6 TABLET | Refills: 0 | Status: SHIPPED | OUTPATIENT
Start: 2024-05-22 | End: 2024-05-25
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
## 2024-05-22 NOTE — DISCHARGE INSTRUCTIONS
We did check her ionized calcium and this was normal.  This does not suggest a low calcium.  I would recommend following up with your primary doctor in the next couple days to have your potassium rechecked.  We will start a small supplement for this.  As we discussed use a cold compress to help with swelling in the face.  Return to the ER if having weakness, numbness, tingling or other concerns.

## 2024-05-22 NOTE — CONSULTS
[facility]    Stroke Telephone Note    I was called by Dionne Armendariz on 05/21/24 regarding patient Oliver Baires. The patient is a 84 year old male coming with right eye droop. Symptoms were noted after he woke up in the afternoon. He in on xarelto for A.fib, CAD, DVT/PE, MI, HTN.    Vitals  BP: 101/76   Pulse: 60   Resp: 16   Temp: 98.4  F (36.9  C)   Weight: 86.2 kg (190 lb)    Imaging Findings  Pending.    Impression  R eye ptosis.    Recommendations  MRI brain w wo contrast + MR orbit  If no stroke, would defer to general neurology.    My recommendations are based on the information provided over the phone by Oliver Baires's in-person providers. They are not intended to replace the clinical judgment of his in-person providers. I was not requested to personally see or examine the patient at this time.     Alejandro Henderson MD

## 2024-05-23 ENCOUNTER — TELEPHONE (OUTPATIENT)
Dept: INTERNAL MEDICINE | Facility: CLINIC | Age: 84
End: 2024-05-23

## 2024-05-23 ENCOUNTER — OFFICE VISIT (OUTPATIENT)
Dept: INTERNAL MEDICINE | Facility: CLINIC | Age: 84
End: 2024-05-23
Payer: COMMERCIAL

## 2024-05-23 VITALS
BODY MASS INDEX: 23.19 KG/M2 | SYSTOLIC BLOOD PRESSURE: 122 MMHG | DIASTOLIC BLOOD PRESSURE: 62 MMHG | OXYGEN SATURATION: 99 % | WEIGHT: 180.6 LBS | HEART RATE: 63 BPM | TEMPERATURE: 98.8 F

## 2024-05-23 DIAGNOSIS — H57.89 EYE SWELLING: Primary | ICD-10-CM

## 2024-05-23 DIAGNOSIS — E87.6 HYPOKALEMIA: ICD-10-CM

## 2024-05-23 DIAGNOSIS — H02.401 DROOPING EYELID, RIGHT: ICD-10-CM

## 2024-05-23 PROCEDURE — 99213 OFFICE O/P EST LOW 20 MIN: CPT | Performed by: INTERNAL MEDICINE

## 2024-05-23 NOTE — TELEPHONE ENCOUNTER
Transitions of Care Outreach  No chief complaint on file.      Most Recent Admission Date: 5/21/2024   Most Recent Admission Diagnosis:      Most Recent Discharge Date: 5/22/2024   Most Recent Discharge Diagnosis: Facial swelling - R22.0  Hypokalemia - E87.6     Transitions of Care Assessment    Discharge Assessment  How are you doing now that you are home?: doing well  How are your symptoms? (Red Flag symptoms escalate to triage hotline per guidelines): Improved  Do you know how to contact your clinic care team if you have future questions or changes to your health status? : Yes  Does the patient have their discharge instructions? : Yes  Does the patient have questions regarding their discharge instructions? : Yes (see comment)  Were you started on any new medications or were there changes to any of your previous medications? : Yes  Does the patient have all of their medications?: Yes  Do you have questions regarding any of your medications? : No  Do you have all of your needed medical supplies or equipment (DME)?  (i.e. oxygen tank, CPAP, cane, etc.): Yes    Follow up Plan     Discharge Follow-Up  Discharge follow up appointment scheduled in alignment with recommended follow up timeframe or Transitions of Risk Category? (Low = within 30 days; Moderate= within 14 days; High= within 7 days): Yes    Future Appointments   Date Time Provider Department Center   5/23/2024  2:00 PM Na Mata MD [facility]    [facility]       Outpatient Plan as outlined on AVS reviewed with patient.    For any urgent concerns, please contact our 24 hour nurse triage line: [phone] ([phone])       Adi Castro RN

## 2024-05-23 NOTE — PROGRESS NOTES
ASSESSMENT/PLAN                                                      (H57.89) Eye swelling  (primary encounter diagnosis)  (H02.401) Drooping eyelid, right  (E87.6) Hypokalemia  Comment: patient is doing well clinically with no recurrent symptoms.  Plan: BMP ordered - patient will obtain on Saturday.    Na Mata MD   [facility]  [address] [place] [address]  T: [phone], F: [phone]    MILTON Baires is a very pleasant 84 year old male who presents for ER follow-up:     Accompanied by daughter.    Patient was seen in the ER 5/21/2024 with drooping and swelling of his right eye. MRI brain and orbit unremarkable. EKG demonstrating atrial fibrillation (known chronic condition).  Labs significant for hypokalemia and hypocalcemia, but ionized calcium within normal limits. Exam reassuring - no neurologic deficits noted. Discharged home with potassium supplements.    No recurrent drooping or swelling of his right eye. Overall, patient feels well and back to baseline.  He does report feeling more fatigued than usual, but that is chronic in nature.    OBJECTIVE                                                      /62   Pulse 63   Temp 98.8  F (37.1  C)   Wt 81.9 kg (180 lb 9.6 oz)   SpO2 99%   BMI 23.19 kg/m    Constitutional: well-appearing  Respiratory: normal respiratory effort; clear to auscultation bilaterally  Cardiovascular: regular rate and rhythm; no edema  Gastrointestinal: soft, non-tender, and non-distended; no organomegaly or masses  Musculoskeletal: normal gait and station  Psych: normal judgment and insight; normal mood and affect; recent and remote memory intact    ---    (Note documentation was completed, in part, with [employer] voice-recognition software. Documentation was reviewed, but some grammatical, spelling, and word errors may remain.)

## 2024-05-25 ENCOUNTER — LAB (OUTPATIENT)
Dept: LAB | Facility: CLINIC | Age: 84
End: 2024-05-25
Payer: COMMERCIAL

## 2024-05-25 DIAGNOSIS — E87.6 HYPOKALEMIA: ICD-10-CM

## 2024-05-25 LAB
ANION GAP SERPL CALCULATED.3IONS-SCNC: 9 MMOL/L (ref 7–15)
BUN SERPL-MCNC: 15.3 MG/DL (ref 8–23)
CALCIUM SERPL-MCNC: 9.7 MG/DL (ref 8.8–10.2)
CHLORIDE SERPL-SCNC: 103 MMOL/L (ref 98–107)
CREAT SERPL-MCNC: 0.83 MG/DL (ref 0.67–1.17)
DEPRECATED HCO3 PLAS-SCNC: 27 MMOL/L (ref 22–29)
EGFRCR SERPLBLD CKD-EPI 2021: 86 ML/MIN/1.73M2
GLUCOSE SERPL-MCNC: 81 MG/DL (ref 70–99)
POTASSIUM SERPL-SCNC: 4.4 MMOL/L (ref 3.4–5.3)
SODIUM SERPL-SCNC: 139 MMOL/L (ref 135–145)

## 2024-05-25 PROCEDURE — 36415 COLL VENOUS BLD VENIPUNCTURE: CPT

## 2024-05-25 PROCEDURE — 80048 BASIC METABOLIC PNL TOTAL CA: CPT

## 2024-05-29 ENCOUNTER — TELEPHONE (OUTPATIENT)
Dept: INTERNAL MEDICINE | Facility: CLINIC | Age: 84
End: 2024-05-29
Payer: COMMERCIAL

## 2024-05-29 NOTE — TELEPHONE ENCOUNTER
CC: Patient calling regarding recent potassium concern.     Patient recently finished 3 day course of potassium after low reading a week ago - then potassium normalized upon recheck. Patient is wondering if he needs to continue on daily potassium to prevent this issue?     Routing to PCP to please review and advise - thank you!     Patient's pharmacy:     [id number] - [place], [place] - [address]     Callback [phone] - ok to leave detailed VM     Becka Duque RN  [facility]

## 2024-05-31 NOTE — TELEPHONE ENCOUNTER
Patient is calling back to follow up on this as he has not heard back.     Reviewed chart, appears patient saw Dr. Mata on 5/23/24 for hospital follow up - routing to Dr. Mata to please advise on potassium - thank you!     Callback [phone] - ok to leave detailed VM     Becka Duque RN  [facility]

## 2024-05-31 NOTE — TELEPHONE ENCOUNTER
Called and left detailed VM for pt with provider note below.   Advised him to return call to the clinic with any further questions.     Thank you,  Leatha Ralph RN

## 2024-05-31 NOTE — TELEPHONE ENCOUNTER
Routing back as follow up to Dr. Henry to please advise on patient's question - thank you!     Becka SU  [facility]

## 2024-05-31 NOTE — TELEPHONE ENCOUNTER
Patient was given information on recent potassium level. He was told not to take any more potassium medication per Dr. Mata.     He wanted to ask Dr. Henry when he should have his potassium rechecked.       Please advise.       Vira Reynolds RN  [facility]

## 2024-08-13 ENCOUNTER — HOSPITAL ENCOUNTER (OUTPATIENT)
Dept: CARDIOLOGY | Facility: CLINIC | Age: 84
Discharge: HOME | End: 2024-08-13
Attending: INTERNAL MEDICINE | Admitting: INTERNAL MEDICINE
Payer: COMMERCIAL

## 2024-08-13 ENCOUNTER — LAB (OUTPATIENT)
Dept: LAB | Facility: CLINIC | Age: 84
End: 2024-08-13
Payer: COMMERCIAL

## 2024-08-13 DIAGNOSIS — I71.20 THORACIC AORTIC ANEURYSM WITHOUT RUPTURE, UNSPECIFIED PART (H): ICD-10-CM

## 2024-08-13 DIAGNOSIS — I25.10 CORONARY ARTERY DISEASE INVOLVING NATIVE CORONARY ARTERY OF NATIVE HEART WITHOUT ANGINA PECTORIS: ICD-10-CM

## 2024-08-13 DIAGNOSIS — I48.21 PERMANENT ATRIAL FIBRILLATION (H): ICD-10-CM

## 2024-08-13 LAB
ANION GAP SERPL CALCULATED.3IONS-SCNC: 9 MMOL/L (ref 7–15)
BUN SERPL-MCNC: 16.2 MG/DL (ref 8–23)
CALCIUM SERPL-MCNC: 9.6 MG/DL (ref 8.8–10.4)
CHLORIDE SERPL-SCNC: 105 MMOL/L (ref 98–107)
CHOLEST SERPL-MCNC: 124 MG/DL
CREAT SERPL-MCNC: 0.87 MG/DL (ref 0.67–1.17)
EGFRCR SERPLBLD CKD-EPI 2021: 85 ML/MIN/1.73M2
FASTING STATUS PATIENT QL REPORTED: YES
GLUCOSE SERPL-MCNC: 97 MG/DL (ref 70–99)
HCO3 SERPL-SCNC: 27 MMOL/L (ref 22–29)
HDLC SERPL-MCNC: 57 MG/DL
LDLC SERPL CALC-MCNC: 57 MG/DL
LVEF ECHO: NORMAL
NONHDLC SERPL-MCNC: 67 MG/DL
POTASSIUM SERPL-SCNC: 4.3 MMOL/L (ref 3.4–5.3)
SODIUM SERPL-SCNC: 141 MMOL/L (ref 135–145)
TRIGL SERPL-MCNC: 49 MG/DL

## 2024-08-13 PROCEDURE — 80048 BASIC METABOLIC PNL TOTAL CA: CPT | Performed by: INTERNAL MEDICINE

## 2024-08-13 PROCEDURE — 93306 TTE W/DOPPLER COMPLETE: CPT

## 2024-08-13 PROCEDURE — 36415 COLL VENOUS BLD VENIPUNCTURE: CPT | Performed by: INTERNAL MEDICINE

## 2024-08-13 PROCEDURE — 80061 LIPID PANEL: CPT | Performed by: INTERNAL MEDICINE

## 2024-08-13 PROCEDURE — 93306 TTE W/DOPPLER COMPLETE: CPT | Mod: 26 | Performed by: INTERNAL MEDICINE

## 2024-08-25 DIAGNOSIS — E78.5 HYPERLIPIDEMIA LDL GOAL <100: ICD-10-CM

## 2024-08-26 RX ORDER — ATORVASTATIN CALCIUM 80 MG/1
TABLET, FILM COATED ORAL
Qty: 90 TABLET | Refills: 1 | Status: SHIPPED | OUTPATIENT
Start: 2024-08-26

## 2024-09-11 DIAGNOSIS — I10 ESSENTIAL HYPERTENSION: ICD-10-CM

## 2024-09-11 RX ORDER — LISINOPRIL 5 MG/1
5 TABLET ORAL DAILY
Qty: 90 TABLET | Refills: 0 | Status: SHIPPED | OUTPATIENT
Start: 2024-09-11

## 2024-10-08 DIAGNOSIS — I77.810 AORTIC ROOT DILATION (H): ICD-10-CM

## 2024-10-08 RX ORDER — METOPROLOL SUCCINATE 25 MG/1
12.5 TABLET, EXTENDED RELEASE ORAL DAILY
Qty: 45 TABLET | Refills: 0 | Status: SHIPPED | OUTPATIENT
Start: 2024-10-08 | End: 2024-11-07

## 2024-10-20 ENCOUNTER — HEALTH MAINTENANCE LETTER (OUTPATIENT)
Age: 84
End: 2024-10-20

## 2024-11-07 ENCOUNTER — OFFICE VISIT (OUTPATIENT)
Dept: CARDIOLOGY | Facility: CLINIC | Age: 84
End: 2024-11-07
Payer: COMMERCIAL

## 2024-11-07 VITALS
OXYGEN SATURATION: 98 % | HEART RATE: 66 BPM | WEIGHT: 183 LBS | SYSTOLIC BLOOD PRESSURE: 124 MMHG | HEIGHT: 74 IN | DIASTOLIC BLOOD PRESSURE: 72 MMHG | BODY MASS INDEX: 23.49 KG/M2

## 2024-11-07 DIAGNOSIS — I25.10 CORONARY ARTERY DISEASE INVOLVING NATIVE CORONARY ARTERY OF NATIVE HEART WITHOUT ANGINA PECTORIS: ICD-10-CM

## 2024-11-07 DIAGNOSIS — I77.810 AORTIC ROOT DILATION (H): Primary | ICD-10-CM

## 2024-11-07 DIAGNOSIS — I48.21 PERMANENT ATRIAL FIBRILLATION (H): ICD-10-CM

## 2024-11-07 RX ORDER — NITROGLYCERIN 0.4 MG/1
0.4 TABLET SUBLINGUAL EVERY 5 MIN PRN
Qty: 25 TABLET | Refills: 1 | Status: SHIPPED | OUTPATIENT
Start: 2024-11-07

## 2024-11-07 RX ORDER — METOPROLOL SUCCINATE 25 MG/1
12.5 TABLET, EXTENDED RELEASE ORAL DAILY
Qty: 45 TABLET | Refills: 3 | Status: SHIPPED | OUTPATIENT
Start: 2024-11-07

## 2024-11-07 NOTE — LETTER
11/7/2024    Renan Henry MD  [address] [place] [id number]    RE: Oliver Baires       Dear Colleague,     I had the pleasure of seeing Oliver AUREA Baires in the [facility].  HPI and Plan:   I the pleasure of following up with Mr. Richardson.  He is a very fit active 84-year-old gentleman.  He has a history of coronary disease with inferior myocardial infarction in 2004 at which time he had a stent to the right coronary artery and 50% LAD     his last actual angiogram was 2010 at that time he he had an acute coronary syndrome he had a ruptured plaque in the proximal LAD he had a stent placed with approximately D taking 90% narrowing down to 0 he had mild narrowing less than 25% in the circumflex the stent to the right coronary artery was widely patent but there was narrowing up to 30 to 40% elsewhere in the right coronary artery his ejection fraction was normal.  Trivial inferior wall hypokinesis  His last stress test was in 2020 it showed small nontransmural infarct in the inferior wall normal ejection fraction no ischemia.  Patient reports no anginal symptoms whatsoever since that time.    The patient had a DVT and pulmonary embolism in 2017 so he has been on blood thinner since that time which is important because it turns out he went into atrial fibrillation at some point and never felt that so he is already on blood thinner.  He is on very low-dose beta-blocker for rate control he also has a ascending aortic aneurysm the current echo was reviewed his sinus of Valsalva is 47 mm acing aorta is between 41 and 42 mm.  That is completely stable and we went back 5 years on echoes and CT unchanged.  Of note his mother did die of an abdominal aortic aneurysm rupture.  We reviewed his blood pressure numbers it is well-controlled reviewed his lipid numbers from Dr. Tovar's office they are well-controlled.  His EKG again confirms controlled atrial fibrillation    At this point since I am retiring I  wonder if we could have the patient to see Dr. Tovar next year and he can renew the beta-blocker assuming there is no chest pain and no other problems we would wait 2 years for the next echocardiogram to check the aortic valve the aortic aneurysm etc.  They have been very stable for the last 5 years    Today's office visit was 40 minutes we addressed all the above cholesterol coronary disease lack of angina blood pressure control atrial fibs control ascending aorta dimensions and graphing those numbers although he back more than 5 years    Orders Placed This Encounter   Procedures     EKG 12-lead complete w/read - Clinics (performed today)     No orders of the defined types were placed in this encounter.    There are no discontinued medications.      Encounter Diagnoses   Name Primary?     Atrial fibrillation (H) Yes     Aortic root dilation (H)      Coronary artery disease involving native coronary artery of native heart without angina pectoris        CURRENT MEDICATIONS:  Current Outpatient Medications   Medication Sig Dispense Refill     atorvastatin (LIPITOR) 80 MG tablet TAKE 1 TABLET BY MOUTH EVERY DAY 90 tablet 1     Cholecalciferol (VITAMIN D PO) Take 1,000 Units by mouth daily        lisinopril (ZESTRIL) 5 MG tablet Take 1 tablet (5 mg) by mouth daily. 90 tablet 0     metoprolol succinate ER (TOPROL XL) 25 MG 24 hr tablet Take 0.5 tablets (12.5 mg) by mouth daily. 45 tablet 0     Multiple Vitamins-Minerals (PRESERVISION AREDS PO) Take by mouth. BID       nitroGLYcerin (NITROSTAT) 0.4 MG sublingual tablet Place 1 tablet (0.4 mg) under the tongue every 5 minutes as needed for chest pain up to 3 tablets per episode. 25 tablet 1     XARELTO ANTICOAGULANT 20 MG TABS tablet TAKE 1 TABLET BY MOUTH EVERY DAY WITH DINNER 90 tablet 3       ALLERGIES     Allergies   Allergen Reactions     Metoprolol      Symptomatic bradycardia       PAST MEDICAL HISTORY:  Past Medical History:   Diagnosis Date     Aortic root  dilatation (H) 2019    chest CTA     Arthritis     Left knee-L knee injury     AV block      Basal cell carcinoma      Bradycardia     not on bb due to low HR     CAD (coronary artery disease)     Cath 2010- angioplasty & Xience 3.0x15mm stent to prox LAD; STEMI 10/2004- 3.5 x 33mm Cypher ALEJANDRA to distal RCA, angioplasty to VINCENT, Lcx25%, Lma 25%     DVT (deep venous thrombosis) (H)     on long term noac due to dvtx2 and now afib     ED (erectile dysfunction)      GERD (gastroesophageal reflux disease)      Hyperlipidemia      Hypertension      IBS (irritable bowel syndrome)      Impaired fasting glucose      Liver cyst 2017    right lower lobe     MI (myocardial infarction) (H)     STEMI 10/2004     Mild ascending aorta dilatation (H) 2019    chest CTA     Permanent atrial fibrillation (H)      Personal history of colonic polyps     Tubular adenomas excised ,      Pneumonia     pleural effusion     Pulmonary emboli (H) 2017    recurrent PE when off anticoag-now lifelong anticoag (unprovoked after car ride)- right leg dvt     Right ventricular dilation     suspect some sleep apnea, pt declines any sleep eval and states would not wear cpap anyway     Skin cancer      Squamous cell carcinoma        PAST SURGICAL HISTORY:  Past Surgical History:   Procedure Laterality Date     CHOLECYSTECTOMY       HC REMOVAL GALLBLADDER       HEART CATH, ANGIOPLASTY  2010    Angioplasty & Xience 3.0x15mm stent to prox LAD     HEART CATH, ANGIOPLASTY  10/30/2004    3.5x33mm Cypher stent to distal RCA; angioplasty to VINCENT     SURGICAL HISTORY OF -       Angioplasty/stenting of RCA     [facility] COLONOSCOPY THRU STOMA, DIAGNOSTIC      Single tubular adenoma excised     [facility] COLONOSCOPY THRU STOMA, DIAGNOSTIC      Tubular adenoma, hepatic flexure.       FAMILY HISTORY:  Family History   Problem Relation Age of Onset     Cancer Father         Pancreatic CA,  age 82     Cardiovascular Mother           of ruptured AAA age 92     Cancer Brother         liver ca       SOCIAL HISTORY:  Social History     Socioeconomic History     Marital status:      Spouse name: None     Number of children: 2     Years of education: None     Highest education level: None   Occupational History     Employer: RETIRED   Tobacco Use     Smoking status: Former     Current packs/day: 0.00     Types: Cigarettes     Quit date: 1980     Years since quittin.2     Smokeless tobacco: Never   Substance and Sexual Activity     Alcohol use: Not Currently     Comment: quit in january     Drug use: No     Sexual activity: Yes   Other Topics Concern     Caffeine Concern No     Comment: 3 cups in am     Weight Concern No     Special Diet No     Exercise Yes     Comment: 1-2 x week     Seat Belt Yes     Social Drivers of Health     Financial Resource Strain: Low Risk  (2024)    Financial Resource Strain      Within the past 12 months, have you or your family members you live with been unable to get utilities (heat, electricity) when it was really needed?: No   Food Insecurity: Low Risk  (2024)    Food Insecurity      Within the past 12 months, did you worry that your food would run out before you got money to buy more?: No      Within the past 12 months, did the food you bought just not last and you didn t have money to get more?: No   Transportation Needs: Low Risk  (2024)    Transportation Needs      Within the past 12 months, has lack of transportation kept you from medical appointments, getting your medicines, non-medical meetings or appointments, work, or from getting things that you need?: No   Interpersonal Safety: Low Risk  (2024)    Interpersonal Safety      Do you feel physically and emotionally safe where you currently live?: Yes      Within the past 12 months, have you been hit, slapped, kicked or otherwise physically hurt by someone?: No      Within the past 12 months, have you been humiliated or  "emotionally abused in other ways by your partner or ex-partner?: No   Housing Stability: Low Risk  (1/11/2024)    Housing Stability      Do you have housing? : Yes      Are you worried about losing your housing?: No       Review of Systems:  Skin:        Eyes:       ENT:       Respiratory:       Cardiovascular:       Gastroenterology:      Genitourinary:       Musculoskeletal:       Neurologic:       Psychiatric:       Heme/Lymph/Imm:       Endocrine:         Physical Exam:  Vitals: /72 (BP Location: Left arm, Patient Position: Sitting)   Pulse 66   Ht 1.88 m (6' 2\")   Wt 83 kg (183 lb)   SpO2 98%   BMI 23.50 kg/m   Orthostatic Vitals from 11/05/24 0858 to 11/07/24 0858    Date and Time Orthostatic BP Orthostatic Pulse Patient Position BP   Location Cuff Size   11/07/24 0829 -- -- Sitting Left arm --         Constitutional:  cooperative, alert and oriented, well developed, well nourished, in no acute distress        Skin:  warm and dry to the touch, no apparent skin lesions or masses noted          Head:  normocephalic, no masses or lesions        Eyes:  pupils equal and round, conjunctivae and lids unremarkable, sclera white, no xanthalasma, EOMS intact, no nystagmus        Lymph:No Cervical lymphadenopathy present;No thyromegaly     ENT:           Neck:  carotid pulses are full and equal bilaterally, JVP normal, no carotid bruit        Respiratory:  normal breath sounds, clear to auscultation, normal A-P diameter, normal symmetry, normal respiratory excursion, no use of accessory muscles         Cardiac: no murmurs, gallops or rubs detected irregularly irregular rhythm              pulses full and equal, no bruits auscultated                                        GI:  abdomen soft, non-tender, BS normoactive, no mass, no HSM, no bruits        Extremities and Muscular Skeletal:  no deformities, clubbing, cyanosis, erythema observed   bilateral LE edema;trace          Neurological:  no gross motor " "deficits        Psych:  Alert and Oriented x 3      Recent Lab Results:  LIPID RESULTS:  Lab Results   Component Value Date    CHOL 124 08/13/2024    CHOL 157 06/08/2021    HDL 57 08/13/2024    HDL 62 06/08/2021    LDL 57 08/13/2024    LDL 83 06/08/2021    TRIG 49 08/13/2024    TRIG 62 06/08/2021    CHOLHDLRATIO 3.1 10/29/2015       LIVER ENZYME RESULTS:  Lab Results   Component Value Date    AST 40 01/09/2023    AST 42 06/08/2021    ALT 40 08/05/2023    ALT 41 06/08/2021       CBC RESULTS:  Lab Results   Component Value Date    WBC 5.8 05/21/2024    WBC 5.6 03/10/2021    RBC 3.57 (L) 05/21/2024    RBC 4.00 (L) 03/10/2021    HGB 11.7 (L) 05/21/2024    HGB 13.0 (L) 03/10/2021    HCT 36.0 (L) 05/21/2024    HCT 39.6 (L) 03/10/2021     (H) 05/21/2024    MCV 99 03/10/2021    MCH 32.8 05/21/2024    MCH 32.5 03/10/2021    MCHC 32.5 05/21/2024    MCHC 32.8 03/10/2021    RDW 13.4 05/21/2024    RDW 13.2 03/10/2021     05/21/2024     03/10/2021       BMP RESULTS:  Lab Results   Component Value Date     08/13/2024     06/08/2021    POTASSIUM 4.3 08/13/2024    POTASSIUM 3.6 01/09/2023    POTASSIUM 4.1 06/08/2021    CHLORIDE 105 08/13/2024    CHLORIDE 105 01/09/2023    CHLORIDE 108 06/08/2021    CO2 27 08/13/2024    CO2 27 01/09/2023    CO2 28 06/08/2021    ANIONGAP 9 08/13/2024    ANIONGAP 7 01/09/2023    ANIONGAP 3 06/08/2021    GLC 97 08/13/2024     (H) 01/09/2023     (H) 06/08/2021    BUN 16.2 08/13/2024    BUN 18 01/09/2023    BUN 14 06/08/2021    CR 0.87 08/13/2024    CR 0.88 06/08/2021    GFRESTIMATED 85 08/13/2024    GFRESTIMATED 80 06/08/2021    GFRESTBLACK >90 06/08/2021    DALIA 9.6 08/13/2024    DALIA 9.1 06/08/2021        A1C RESULTS:  No results found for: \"A1C\"    INR RESULTS:  Lab Results   Component Value Date    INR 0.96 10/24/2017    INR 0.98 06/23/2010           CC  Tommy Villarreal MD  [address]  [place] [id number]      Thank you for allowing me to " participate in the care of your patient.      Sincerely,     Tommy Villarreal MD     [facility]  cc:   Tommy Villarreal MD  [address]  [place]  [place] [address]

## 2024-11-07 NOTE — PROGRESS NOTES
HPI and Plan:   I the pleasure of following up with Mr. Richardson.  He is a very fit active 84-year-old gentleman.  He has a history of coronary disease with inferior myocardial infarction in 2004 at which time he had a stent to the right coronary artery and 50% LAD     his last actual angiogram was 2010 at that time he he had an acute coronary syndrome he had a ruptured plaque in the proximal LAD he had a stent placed with approximately D taking 90% narrowing down to 0 he had mild narrowing less than 25% in the circumflex the stent to the right coronary artery was widely patent but there was narrowing up to 30 to 40% elsewhere in the right coronary artery his ejection fraction was normal.  Trivial inferior wall hypokinesis  His last stress test was in 2020 it showed small nontransmural infarct in the inferior wall normal ejection fraction no ischemia.  Patient reports no anginal symptoms whatsoever since that time.    The patient had a DVT and pulmonary embolism in 2017 so he has been on blood thinner since that time which is important because it turns out he went into atrial fibrillation at some point and never felt that so he is already on blood thinner.  He is on very low-dose beta-blocker for rate control he also has a ascending aortic aneurysm the current echo was reviewed his sinus of Valsalva is 47 mm acing aorta is between 41 and 42 mm.  That is completely stable and we went back 5 years on echoes and CT unchanged.  Of note his mother did die of an abdominal aortic aneurysm rupture.  We reviewed his blood pressure numbers it is well-controlled reviewed his lipid numbers from Dr. Tovar's office they are well-controlled.  His EKG again confirms controlled atrial fibrillation    At this point since I am retiring I wonder if we could have the patient to see Dr. Tovar next year and he can renew the beta-blocker assuming there is no chest pain and no other problems we would wait 2 years for the next echocardiogram  to check the aortic valve the aortic aneurysm etc.  They have been very stable for the last 5 years    Today's office visit was 40 minutes we addressed all the above cholesterol coronary disease lack of angina blood pressure control atrial fibs control ascending aorta dimensions and graphing those numbers although he back more than 5 years    Orders Placed This Encounter   Procedures    EKG 12-lead complete w/read - Clinics (performed today)     No orders of the defined types were placed in this encounter.    There are no discontinued medications.      Encounter Diagnoses   Name Primary?    Atrial fibrillation (H) Yes    Aortic root dilation (H)     Coronary artery disease involving native coronary artery of native heart without angina pectoris        CURRENT MEDICATIONS:  Current Outpatient Medications   Medication Sig Dispense Refill    atorvastatin (LIPITOR) 80 MG tablet TAKE 1 TABLET BY MOUTH EVERY DAY 90 tablet 1    Cholecalciferol (VITAMIN D PO) Take 1,000 Units by mouth daily       lisinopril (ZESTRIL) 5 MG tablet Take 1 tablet (5 mg) by mouth daily. 90 tablet 0    metoprolol succinate ER (TOPROL XL) 25 MG 24 hr tablet Take 0.5 tablets (12.5 mg) by mouth daily. 45 tablet 0    Multiple Vitamins-Minerals (PRESERVISION AREDS PO) Take by mouth. BID      nitroGLYcerin (NITROSTAT) 0.4 MG sublingual tablet Place 1 tablet (0.4 mg) under the tongue every 5 minutes as needed for chest pain up to 3 tablets per episode. 25 tablet 1    XARELTO ANTICOAGULANT 20 MG TABS tablet TAKE 1 TABLET BY MOUTH EVERY DAY WITH DINNER 90 tablet 3       ALLERGIES     Allergies   Allergen Reactions    Metoprolol      Symptomatic bradycardia       PAST MEDICAL HISTORY:  Past Medical History:   Diagnosis Date    Aortic root dilatation (H) 12/31/2019    chest CTA    Arthritis     Left knee-L knee injury    AV block     Basal cell carcinoma     Bradycardia     not on bb due to low HR    CAD (coronary artery disease)     Cath 6/2010-  angioplasty & Xience 3.0x15mm stent to prox LAD; STEMI 10/2004- 3.5 x 33mm Cypher ALEJANDRA to distal RCA, angioplasty to VINCENT, Lcx25%, Lma 25%    DVT (deep venous thrombosis) (H)     on long term noac due to dvtx2 and now afib    ED (erectile dysfunction)     GERD (gastroesophageal reflux disease)     Hyperlipidemia     Hypertension     IBS (irritable bowel syndrome)     Impaired fasting glucose     Liver cyst 2017    right lower lobe    MI (myocardial infarction) (H)     STEMI 10/2004    Mild ascending aorta dilatation (H) 2019    chest CTA    Permanent atrial fibrillation (H)     Personal history of colonic polyps     Tubular adenomas excised ,     Pneumonia     pleural effusion    Pulmonary emboli (H) 2017    recurrent PE when off anticoag-now lifelong anticoag (unprovoked after car ride)- right leg dvt    Right ventricular dilation     suspect some sleep apnea, pt declines any sleep eval and states would not wear cpap anyway    Skin cancer     Squamous cell carcinoma        PAST SURGICAL HISTORY:  Past Surgical History:   Procedure Laterality Date    CHOLECYSTECTOMY      HC REMOVAL GALLBLADDER      HEART CATH, ANGIOPLASTY  2010    Angioplasty & Xience 3.0x15mm stent to prox LAD    HEART CATH, ANGIOPLASTY  10/30/2004    3.5x33mm Cypher stent to distal RCA; angioplasty to VINCENT    SURGICAL HISTORY OF -       Angioplasty/stenting of RCA    ZZ COLONOSCOPY THRU STOMA, DIAGNOSTIC      Single tubular adenoma excised    ZZ COLONOSCOPY THRU STOMA, DIAGNOSTIC      Tubular adenoma, hepatic flexure.       FAMILY HISTORY:  Family History   Problem Relation Age of Onset    Cancer Father         Pancreatic CA,  age 82    Cardiovascular Mother          of ruptured AAA age 92    Cancer Brother         liver ca       SOCIAL HISTORY:  Social History     Socioeconomic History    Marital status:      Spouse name: None    Number of children: 2    Years of education: None    Highest  education level: None   Occupational History     Employer: RETIRED   Tobacco Use    Smoking status: Former     Current packs/day: 0.00     Types: Cigarettes     Quit date: 1980     Years since quittin.2    Smokeless tobacco: Never   Substance and Sexual Activity    Alcohol use: Not Currently     Comment: quit in january    Drug use: No    Sexual activity: Yes   Other Topics Concern    Caffeine Concern No     Comment: 3 cups in am    Weight Concern No    Special Diet No    Exercise Yes     Comment: 1-2 x week    Seat Belt Yes     Social Drivers of Health     Financial Resource Strain: Low Risk  (2024)    Financial Resource Strain     Within the past 12 months, have you or your family members you live with been unable to get utilities (heat, electricity) when it was really needed?: No   Food Insecurity: Low Risk  (2024)    Food Insecurity     Within the past 12 months, did you worry that your food would run out before you got money to buy more?: No     Within the past 12 months, did the food you bought just not last and you didn t have money to get more?: No   Transportation Needs: Low Risk  (2024)    Transportation Needs     Within the past 12 months, has lack of transportation kept you from medical appointments, getting your medicines, non-medical meetings or appointments, work, or from getting things that you need?: No   Interpersonal Safety: Low Risk  (2024)    Interpersonal Safety     Do you feel physically and emotionally safe where you currently live?: Yes     Within the past 12 months, have you been hit, slapped, kicked or otherwise physically hurt by someone?: No     Within the past 12 months, have you been humiliated or emotionally abused in other ways by your partner or ex-partner?: No   Housing Stability: Low Risk  (2024)    Housing Stability     Do you have housing? : Yes     Are you worried about losing your housing?: No       Review of Systems:  Skin:        Eyes:      "  ENT:       Respiratory:       Cardiovascular:       Gastroenterology:      Genitourinary:       Musculoskeletal:       Neurologic:       Psychiatric:       Heme/Lymph/Imm:       Endocrine:         Physical Exam:  Vitals: /72 (BP Location: Left arm, Patient Position: Sitting)   Pulse 66   Ht 1.88 m (6' 2\")   Wt 83 kg (183 lb)   SpO2 98%   BMI 23.50 kg/m   Orthostatic Vitals from 11/05/24 0858 to 11/07/24 0858    Date and Time Orthostatic BP Orthostatic Pulse Patient Position BP   Location Cuff Size   11/07/24 0829 -- -- Sitting Left arm --         Constitutional:  cooperative, alert and oriented, well developed, well nourished, in no acute distress        Skin:  warm and dry to the touch, no apparent skin lesions or masses noted          Head:  normocephalic, no masses or lesions        Eyes:  pupils equal and round, conjunctivae and lids unremarkable, sclera white, no xanthalasma, EOMS intact, no nystagmus        Lymph:No Cervical lymphadenopathy present;No thyromegaly     ENT:           Neck:  carotid pulses are full and equal bilaterally, JVP normal, no carotid bruit        Respiratory:  normal breath sounds, clear to auscultation, normal A-P diameter, normal symmetry, normal respiratory excursion, no use of accessory muscles         Cardiac: no murmurs, gallops or rubs detected irregularly irregular rhythm              pulses full and equal, no bruits auscultated                                        GI:  abdomen soft, non-tender, BS normoactive, no mass, no HSM, no bruits        Extremities and Muscular Skeletal:  no deformities, clubbing, cyanosis, erythema observed   bilateral LE edema;trace          Neurological:  no gross motor deficits        Psych:  Alert and Oriented x 3      Recent Lab Results:  LIPID RESULTS:  Lab Results   Component Value Date    CHOL 124 08/13/2024    CHOL 157 06/08/2021    HDL 57 08/13/2024    HDL 62 06/08/2021    LDL 57 08/13/2024    LDL 83 06/08/2021    TRIG 49 " "08/13/2024    TRIG 62 06/08/2021    CHOLHDLRATIO 3.1 10/29/2015       LIVER ENZYME RESULTS:  Lab Results   Component Value Date    AST 40 01/09/2023    AST 42 06/08/2021    ALT 40 08/05/2023    ALT 41 06/08/2021       CBC RESULTS:  Lab Results   Component Value Date    WBC 5.8 05/21/2024    WBC 5.6 03/10/2021    RBC 3.57 (L) 05/21/2024    RBC 4.00 (L) 03/10/2021    HGB 11.7 (L) 05/21/2024    HGB 13.0 (L) 03/10/2021    HCT 36.0 (L) 05/21/2024    HCT 39.6 (L) 03/10/2021     (H) 05/21/2024    MCV 99 03/10/2021    MCH 32.8 05/21/2024    MCH 32.5 03/10/2021    MCHC 32.5 05/21/2024    MCHC 32.8 03/10/2021    RDW 13.4 05/21/2024    RDW 13.2 03/10/2021     05/21/2024     03/10/2021       BMP RESULTS:  Lab Results   Component Value Date     08/13/2024     06/08/2021    POTASSIUM 4.3 08/13/2024    POTASSIUM 3.6 01/09/2023    POTASSIUM 4.1 06/08/2021    CHLORIDE 105 08/13/2024    CHLORIDE 105 01/09/2023    CHLORIDE 108 06/08/2021    CO2 27 08/13/2024    CO2 27 01/09/2023    CO2 28 06/08/2021    ANIONGAP 9 08/13/2024    ANIONGAP 7 01/09/2023    ANIONGAP 3 06/08/2021    GLC 97 08/13/2024     (H) 01/09/2023     (H) 06/08/2021    BUN 16.2 08/13/2024    BUN 18 01/09/2023    BUN 14 06/08/2021    CR 0.87 08/13/2024    CR 0.88 06/08/2021    GFRESTIMATED 85 08/13/2024    GFRESTIMATED 80 06/08/2021    GFRESTBLACK >90 06/08/2021    DALIA 9.6 08/13/2024    DALIA 9.1 06/08/2021        A1C RESULTS:  No results found for: \"A1C\"    INR RESULTS:  Lab Results   Component Value Date    INR 0.96 10/24/2017    INR 0.98 06/23/2010           CC  Tommy Villarreal MD  [address]  [place] [phone]  "

## 2024-12-02 DIAGNOSIS — I26.99 RECURRENT PULMONARY EMBOLISM (H): ICD-10-CM

## 2024-12-03 RX ORDER — RIVAROXABAN 20 MG/1
20 TABLET, FILM COATED ORAL
Qty: 90 TABLET | Refills: 3 | Status: SHIPPED | OUTPATIENT
Start: 2024-12-03

## 2024-12-09 DIAGNOSIS — I10 ESSENTIAL HYPERTENSION: ICD-10-CM

## 2024-12-09 RX ORDER — LISINOPRIL 5 MG/1
5 TABLET ORAL DAILY
Qty: 90 TABLET | Refills: 0 | Status: SHIPPED | OUTPATIENT
Start: 2024-12-09

## 2025-03-10 DIAGNOSIS — I10 ESSENTIAL HYPERTENSION: ICD-10-CM

## 2025-03-10 RX ORDER — LISINOPRIL 5 MG/1
5 TABLET ORAL DAILY
Qty: 90 TABLET | Refills: 0 | Status: SHIPPED | OUTPATIENT
Start: 2025-03-10

## 2025-07-03 ENCOUNTER — OFFICE VISIT (OUTPATIENT)
Dept: URGENT CARE | Facility: URGENT CARE | Age: 85
End: 2025-07-03
Payer: COMMERCIAL

## 2025-07-03 VITALS
DIASTOLIC BLOOD PRESSURE: 61 MMHG | OXYGEN SATURATION: 97 % | SYSTOLIC BLOOD PRESSURE: 116 MMHG | TEMPERATURE: 98.1 F | RESPIRATION RATE: 16 BRPM | BODY MASS INDEX: 23.87 KG/M2 | WEIGHT: 186 LBS | HEIGHT: 74 IN | HEART RATE: 50 BPM

## 2025-07-03 DIAGNOSIS — H61.23 BILATERAL IMPACTED CERUMEN: ICD-10-CM

## 2025-07-03 DIAGNOSIS — H60.393 INFECTIVE OTITIS EXTERNA, BILATERAL: Primary | ICD-10-CM

## 2025-07-03 RX ORDER — OFLOXACIN 3 MG/ML
5 SOLUTION AURICULAR (OTIC) 2 TIMES DAILY
Qty: 10 ML | Refills: 0 | Status: SHIPPED | OUTPATIENT
Start: 2025-07-03 | End: 2025-07-10

## 2025-07-03 NOTE — PROGRESS NOTES
Infective otitis externa, bilateral  - ofloxacin (FLOXIN) 0.3 % otic solution; Place 5 drops into both ears 2 times daily for 7 days.    Bilateral impacted cerumen  - ND REMOVAL IMPACTED CERUMEN IRRIGATION/LVG UNILAT    Bilateral ear/s were irrigated with a warm water/hydrogen peroxide solution. Cerumen was removed without complication. Ear/s were re-examined and found to be with evidence of otitis externa.        Swimmer's Ear: Care Instructions  Overview     Swimmer's ear (otitis externa) is inflammation or infection of the ear canal. This is the passage that leads from the outer ear to the eardrum. Any water, sand, or other debris that gets into the ear canal and stays there can cause swimmer's ear. Putting cotton swabs or other items in the ear to clean it can also cause this problem.  Swimmer's ear can be very painful. But you can treat the pain and infection with medicines. You should feel better in a few days.  Follow-up care is a key part of your treatment and safety. Be sure to make and go to all appointments, and call your doctor if you are having problems. It's also a good idea to know your test results and keep a list of the medicines you take.  How can you care for yourself at home?  Cleaning and care  Use antibiotic drops as your doctor directs.  Do not insert eardrops (other than the antibiotic eardrops) or anything else into the ear unless your doctor has told you to.  Avoid getting water in the ear until the problem clears up. Use cotton lightly coated with petroleum jelly as an earplug. Do not use plastic earplugs.  Use a hair dryer set on low to carefully dry the ear after you shower.  To ease ear pain, hold a warm washcloth against your ear.  Take pain medicines exactly as directed.  If the doctor gave you a prescription medicine for pain, take it as prescribed.  If you are not taking a prescription pain medicine, ask your doctor if you can take an over-the-counter medicine.  Inserting  "eardrops  Warm the drops to body temperature by rolling the container in your hands. Or you can place it in a cup of warm water for a few minutes.  Lie down, with your ear facing up.  Place drops inside the ear. Follow your doctor's instructions (or the directions on the label) for how many drops to use. Gently wiggle the outer ear or pull the ear up and back to help the drops get into the ear.  It's important to keep the liquid in the ear canal for 3 to 5 minutes.  When should you call for help?   Call your doctor now or seek immediate medical care if:    You have a new or higher fever.     You have new or worse pain, swelling, warmth, or redness around or behind your ear.     You have new or increasing pus or blood draining from your ear.   Watch closely for changes in your health, and be sure to contact your doctor if:    You are not getting better after 2 days (48 hours).   Where can you learn more?  Go to https://www.[employer].net/patiented  Enter C706 in the search box to learn more about \"Swimmer's Ear: Care Instructions.\"  Current as of: October 27, 2024  Content Version: 14.5    8906-0079 [employer].   Care instructions adapted under license by your healthcare professional. If you have questions about a medical condition or this instruction, always ask your healthcare professional. [employer] disclaims any warranty or liability for your use of this information.          Patient was advised to return to clinic for reevaluation (either UC or PCP) if symptoms do not improve in 7 days. Patient educated on red flag symptoms and asked to go directly to the ED if these symptoms present themselves.     [facility]    Subjective   85 year old who presents to clinic today for the following health issues:    Ear Problem and Derm Problem       HPI     Acute Illness  Acute illness concerns: Right ear feels plugged and having pressure in the ear and jaw " feels off when biting down x 1 week  Symptoms:  Fever: No  Chills/Sweats: No  Headache (location?): No  Sinus Pressure: No  Conjunctivitis:  No  Ear Pain: no  Rhinorrhea: No  Congestion: No  Sore Throat: No  Cough: no  Wheeze: No  Decreased Appetite: No  Nausea: No  Vomiting: No  Diarrhea: No  Dysuria/Freq.: No  Dysuria or Hematuria: No  Fatigue/Achiness: No  Sick/Strep Exposure: No  Therapies tried and outcome: None      Review of Systems   Review of Systems   See HPI    Objective    Temp: 98.1  F (36.7  C) Temp src: Oral BP: 116/61 Pulse: 50   Resp: 16 SpO2: 97 %       Physical Exam   Physical Exam  Constitutional:       General: He is not in acute distress.     Appearance: Normal appearance. He is normal weight. He is not ill-appearing, toxic-appearing or diaphoretic.   HENT:      Head: Normocephalic and atraumatic.      Right Ear: Tympanic membrane and external ear normal. Swelling and tenderness present. There is impacted cerumen.      Left Ear: Tympanic membrane, ear canal and external ear normal. No swelling or tenderness. There is no impacted cerumen.   Cardiovascular:      Rate and Rhythm: Normal rate.      Pulses: Normal pulses.   Pulmonary:      Effort: Pulmonary effort is normal. No respiratory distress.   Neurological:      General: No focal deficit present.      Mental Status: He is alert and oriented to person, place, and time. Mental status is at baseline.      Gait: Gait normal.   Psychiatric:         Mood and Affect: Mood normal.         Behavior: Behavior normal.         Thought Content: Thought content normal.         Judgment: Judgment normal.          No results found for this or any previous visit (from the past 24 hours).    All labs that were finalized during the visit were reviewed by me personally. Any pending labs will be reviewed by urgent care staff in the following days. Patient will be notified either via [employer] message or phone call of any pertinent abnormal results. Patient was  informed that we will not necessarily reach out if pending lab results are normal.

## 2025-07-16 DIAGNOSIS — E78.5 HYPERLIPIDEMIA LDL GOAL <100: ICD-10-CM

## 2025-07-16 RX ORDER — ATORVASTATIN CALCIUM 80 MG/1
80 TABLET, FILM COATED ORAL DAILY
Qty: 90 TABLET | Refills: 1 | Status: SHIPPED | OUTPATIENT
Start: 2025-07-16

## 2025-07-16 NOTE — TELEPHONE ENCOUNTER
Called and spoke with pt.   He states he takes this medication daily every morning and is unsure the reason for the gap in therapy.     Med pended, routing to PCP for review.     Thank you,  Leatha Ralph RN

## 2025-07-16 NOTE — TELEPHONE ENCOUNTER
Clinic RN: Please investigate patient's chart or contact patient if the information cannot be found because patient should have run out of this medication on 1/2025. Confirm patient is taking this medication as prescribed. Document findings and route refill encounter to provider for approval or denial.    Shi Elizabeth, RN on 7/16/2025 at 8:29 AM

## 2025-07-31 DIAGNOSIS — I10 ESSENTIAL HYPERTENSION: ICD-10-CM

## 2025-07-31 RX ORDER — LISINOPRIL 5 MG/1
5 TABLET ORAL DAILY
Qty: 60 TABLET | Refills: 0 | Status: SHIPPED | OUTPATIENT
Start: 2025-07-31